# Patient Record
Sex: FEMALE | Race: WHITE | NOT HISPANIC OR LATINO | Employment: UNEMPLOYED | URBAN - NONMETROPOLITAN AREA
[De-identification: names, ages, dates, MRNs, and addresses within clinical notes are randomized per-mention and may not be internally consistent; named-entity substitution may affect disease eponyms.]

---

## 2018-02-26 ENCOUNTER — OFFICE VISIT (OUTPATIENT)
Dept: PSYCHIATRY | Facility: CLINIC | Age: 28
End: 2018-02-26

## 2018-02-26 VITALS — HEIGHT: 65 IN | BODY MASS INDEX: 31.99 KG/M2 | WEIGHT: 192 LBS

## 2018-02-26 DIAGNOSIS — F39 MOOD DISORDER (HCC): Primary | ICD-10-CM

## 2018-02-26 DIAGNOSIS — F41.1 GENERALIZED ANXIETY DISORDER: ICD-10-CM

## 2018-02-26 DIAGNOSIS — F41.0 PANIC DISORDER: ICD-10-CM

## 2018-02-26 PROCEDURE — 90792 PSYCH DIAG EVAL W/MED SRVCS: CPT | Performed by: NURSE PRACTITIONER

## 2018-02-26 RX ORDER — BUPROPION HYDROCHLORIDE 300 MG/1
TABLET ORAL
COMMUNITY
Start: 2018-01-25 | End: 2018-09-27
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
## 2018-09-27 ENCOUNTER — OFFICE VISIT (OUTPATIENT)
Dept: PSYCHIATRY | Facility: CLINIC | Age: 28
End: 2018-09-27

## 2018-09-27 VITALS — BODY MASS INDEX: 28.89 KG/M2 | WEIGHT: 173.4 LBS | HEIGHT: 65 IN

## 2018-09-27 DIAGNOSIS — F31.81 BIPOLAR II DISORDER (HCC): Primary | ICD-10-CM

## 2018-09-27 DIAGNOSIS — F41.1 GENERALIZED ANXIETY DISORDER: ICD-10-CM

## 2018-09-27 DIAGNOSIS — F41.0 PANIC DISORDER: ICD-10-CM

## 2018-09-27 PROCEDURE — 99214 OFFICE O/P EST MOD 30 MIN: CPT | Performed by: NURSE PRACTITIONER

## 2018-09-27 RX ORDER — LAMOTRIGINE 25 MG/1
TABLET ORAL
Qty: 45 TABLET | Refills: 0 | Status: SHIPPED | OUTPATIENT
Start: 2018-09-27 | End: 2018-11-15 | Stop reason: SDUPTHER

## 2018-09-27 RX ORDER — PROPRANOLOL HYDROCHLORIDE 10 MG/1
TABLET ORAL
Qty: 60 TABLET | Refills: 1 | Status: SHIPPED | OUTPATIENT
Start: 2018-09-27 | End: 2018-11-15 | Stop reason: SDUPTHER

## 2018-09-27 RX ORDER — DULOXETIN HYDROCHLORIDE 30 MG/1
30 CAPSULE, DELAYED RELEASE ORAL DAILY
Qty: 30 CAPSULE | Refills: 1 | Status: SHIPPED | OUTPATIENT
Start: 2018-09-27 | End: 2018-11-15 | Stop reason: SDUPTHER
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
## 2018-11-15 ENCOUNTER — OFFICE VISIT (OUTPATIENT)
Dept: PSYCHIATRY | Facility: CLINIC | Age: 28
End: 2018-11-15

## 2018-11-15 VITALS
HEIGHT: 65 IN | WEIGHT: 176.8 LBS | SYSTOLIC BLOOD PRESSURE: 122 MMHG | BODY MASS INDEX: 29.46 KG/M2 | DIASTOLIC BLOOD PRESSURE: 80 MMHG

## 2018-11-15 DIAGNOSIS — F41.0 PANIC DISORDER: ICD-10-CM

## 2018-11-15 DIAGNOSIS — F41.1 GENERALIZED ANXIETY DISORDER: ICD-10-CM

## 2018-11-15 DIAGNOSIS — F31.81 BIPOLAR II DISORDER (HCC): Primary | ICD-10-CM

## 2018-11-15 PROCEDURE — 99213 OFFICE O/P EST LOW 20 MIN: CPT | Performed by: NURSE PRACTITIONER

## 2018-11-15 RX ORDER — PROPRANOLOL HYDROCHLORIDE 10 MG/1
TABLET ORAL
Qty: 60 TABLET | Refills: 5 | Status: SHIPPED | OUTPATIENT
Start: 2018-11-15 | End: 2019-03-14 | Stop reason: SDUPTHER

## 2018-11-15 RX ORDER — LAMOTRIGINE 100 MG/1
TABLET ORAL
Qty: 15 TABLET | Refills: 5 | Status: SHIPPED | OUTPATIENT
Start: 2018-11-15 | End: 2019-03-14 | Stop reason: SDUPTHER

## 2018-11-15 RX ORDER — DULOXETIN HYDROCHLORIDE 30 MG/1
30 CAPSULE, DELAYED RELEASE ORAL DAILY
Qty: 30 CAPSULE | Refills: 5 | Status: SHIPPED | OUTPATIENT
Start: 2018-11-15 | End: 2019-03-14 | Stop reason: SDUPTHER

## 2018-11-15 NOTE — PROGRESS NOTES
"    Subjective   Tea Marie is a 28 y.o. female who is here today for medication management follow up.    Chief Complaint:     Bipolar II disorder (CMS/HCC)    Generalized anxiety disorder    Panic disorder        History of Present Illness Patient presents by herself reporting doing really well on current medications, denies depression, is motivated, interested in activities, moved to new apartment and loves it. Celebrating her son's 9th birthday, denies hypomania, denies sleep disturbance. Is working full time and feels \"leveled out\". Denies panic, scores anxiety 3 on most days or lower. Reports compliance with medications .  Denies adverse effects from medications.   (Scales based on 0 - 10 with 10 being the worst)        The following portions of the patient's history were reviewed and updated as appropriate: allergies, current medications, past family history, past medical history, past social history, past surgical history and problem list.    Review of Systemsdenies fever, cough, s/s’s of infection, denies GI/ problems, denies new medical issues     Objective   Physical Exam  Blood pressure 122/80, height 165.1 cm (65\"), weight 80.2 kg (176 lb 12.8 oz).    Allergies   Allergen Reactions   • Ceclor [Cefaclor] Rash       Current Medications:   Current Outpatient Medications   Medication Sig Dispense Refill   • DULoxetine (CYMBALTA) 30 MG capsule Take 1 capsule by mouth Daily. 30 capsule 5   • lamoTRIgine (LaMICtal) 100 MG tablet Take 1/2 tablet daily 15 tablet 5   • propranolol (INDERAL) 10 MG tablet Take twice a day as needed for anxiety 60 tablet 5     No current facility-administered medications for this visit.             Appearance: appropriate  Hygiene:   good  Cooperation:  Cooperative  Eye Contact:  Good  Psychomotor Behavior:  No psychomotor agitation/retardation, No EPS, No motor tics  Mood:  within normal limits  Affect:  Appropriate  Hopelessness: Denies  Speech:  Normal  Thought " Process:  Linear  Thought Content:  Normal  Concentration: Normal   Suicidal:  None  Homicidal:  None  Hallucinations:  None  Delusion:  None  Memory:  Intact  Orientation:  Person, Place, Time and Situation  Reliability:  fair  Insight:  Fair  Judgement:  Fair  Impulse Control:  Fair  Estimated Intelligence: average range      Assessment/Plan   Diagnoses and all orders for this visit:    Bipolar II disorder (CMS/HCC)    Generalized anxiety disorder    Panic disorder    Other orders  -     DULoxetine (CYMBALTA) 30 MG capsule; Take 1 capsule by mouth Daily.  -     lamoTRIgine (LaMICtal) 100 MG tablet; Take 1/2 tablet daily  -     propranolol (INDERAL) 10 MG tablet; Take twice a day as needed for anxiety        IMPRESSION: stable   PLAN:   Refill medications as above         We discussed risks, benefits, and side effects of the above medications and the patient was agreeable with the plan. Patient was educated on the importance of compliance with treatment and follow-up appointments.     Sleep hygiene reviewed, encouraged more whole foods, less processed foods, fruits vegetables , more activity   Coping skills reviewed and encouraged positive framing of thoughts    Assisted patient in processing above session content; acknowledged and normalized patient’s thoughts, feelings, and concerns.  Applied  positive coping skills and behavior management in session.  Allowed patient to freely discuss issues without interruption or judgment. Provided safe, confidential environment to facilitate the development of positive therapeutic relationship and encourage open, honest communication. Assisted patient in identifying risk factors which would indicate the need for higher level of care including thoughts to harm self or others and/or self-harming behavior and encouraged patient to contact this office, call 911, or present to the nearest emergency room should any of these events occur. Discussed crisis intervention services and  means to access.  Patient adamantly and convincingly denies current suicidal or homicidal ideation or perceptual disturbance.    Treatment Plan: stabilize mood, patient will stay out of the hospital and be at optimal level of functioning, take all medication as prescribed. Patient verbalized  understanding and agreement to plan.    Instructed to call for questions or concerns and return early if necessary. Crisis plan reviewed including going to the Emergency department.    Return in about 4 weeks (around 12/13/2018).

## 2019-03-14 ENCOUNTER — OFFICE VISIT (OUTPATIENT)
Dept: PSYCHIATRY | Facility: CLINIC | Age: 29
End: 2019-03-14

## 2019-03-14 VITALS
DIASTOLIC BLOOD PRESSURE: 76 MMHG | WEIGHT: 170 LBS | SYSTOLIC BLOOD PRESSURE: 116 MMHG | BODY MASS INDEX: 28.32 KG/M2 | HEIGHT: 65 IN

## 2019-03-14 DIAGNOSIS — F31.81 BIPOLAR II DISORDER (HCC): Primary | ICD-10-CM

## 2019-03-14 DIAGNOSIS — F41.1 GENERALIZED ANXIETY DISORDER: ICD-10-CM

## 2019-03-14 DIAGNOSIS — F41.0 PANIC DISORDER: ICD-10-CM

## 2019-03-14 PROCEDURE — 99213 OFFICE O/P EST LOW 20 MIN: CPT | Performed by: NURSE PRACTITIONER

## 2019-03-14 RX ORDER — TRAZODONE HYDROCHLORIDE 150 MG/1
150 TABLET ORAL NIGHTLY
Qty: 30 TABLET | Refills: 2 | Status: SHIPPED | OUTPATIENT
Start: 2019-03-14 | End: 2019-04-23 | Stop reason: SDUPTHER

## 2019-03-14 RX ORDER — PROPRANOLOL HYDROCHLORIDE 20 MG/1
TABLET ORAL
Qty: 60 TABLET | Refills: 2 | Status: SHIPPED | OUTPATIENT
Start: 2019-03-14 | End: 2019-04-23 | Stop reason: SDUPTHER

## 2019-03-14 RX ORDER — DULOXETIN HYDROCHLORIDE 30 MG/1
30 CAPSULE, DELAYED RELEASE ORAL DAILY
Qty: 30 CAPSULE | Refills: 2 | Status: SHIPPED | OUTPATIENT
Start: 2019-03-14 | End: 2019-04-23 | Stop reason: SDUPTHER

## 2019-03-14 RX ORDER — LAMOTRIGINE 100 MG/1
TABLET ORAL
Qty: 15 TABLET | Refills: 2 | Status: SHIPPED | OUTPATIENT
Start: 2019-03-14 | End: 2019-04-23 | Stop reason: SDUPTHER

## 2019-03-14 NOTE — PROGRESS NOTES
"    Subjective   Tea Marie is a 28 y.o. female who is here today for medication management follow up.    Chief Complaint:     Bipolar II disorder     Generalized anxiety disorder    Panic disorder        History of Present Illness Patient presents she got full time at work and has good benefits. She is raising 11yo and 10yo boys. Still really likes her apartment. Sleeping. Anxiety 6 most days, down from 9 and panic attacks but still anxious, really notices it if forgot to take the propranolol. Denies depression. Denies hypomania, denies anger .  Denies adverse effects from medications.   (Scales based on 0 - 10 with 10 being the worst)        The following portions of the patient's history were reviewed and updated as appropriate: allergies, current medications, past family history, past medical history, past social history, past surgical history and problem list.    Review of Systemsdenies fever, cough, s/s’s of infection, denies GI/ problems, denies new medical issues     Objective   Physical Exam  Blood pressure 116/76, height 165.1 cm (65\"), weight 77.1 kg (170 lb).    Allergies   Allergen Reactions   • Ceclor [Cefaclor] Rash       Current Medications:   Current Outpatient Medications   Medication Sig Dispense Refill   • DULoxetine (CYMBALTA) 30 MG capsule Take 1 capsule by mouth Daily. 30 capsule 2   • lamoTRIgine (LaMICtal) 100 MG tablet Take 1/2 tablet daily 15 tablet 2   • propranolol (INDERAL) 20 MG tablet Take twice a day as needed for anxiety 60 tablet 2   • traZODone (DESYREL) 150 MG tablet Take 1 tablet by mouth Every Night. 30 tablet 2     No current facility-administered medications for this visit.        Appearance: appropriate  Hygiene:   good  Cooperation:  Cooperative  Eye Contact:  Good  Psychomotor Behavior:  No psychomotor agitation/retardation, No EPS, No motor tics  Mood:  within normal limits  Affect:  Appropriate  Hopelessness: Denies  Speech:  Normal  Thought Process:  " Linear  Thought Content:  Normal  Concentration: Normal   Suicidal:  None  Homicidal:  None  Hallucinations:  None  Delusion:  None  Memory:  Intact  Orientation:  Person, Place, Time and Situation  Reliability:  fair  Insight:  Fair  Judgement:  Fair  Impulse Control:  Fair  Estimated Intelligence: average range        Assessment/Plan   Diagnoses and all orders for this visit:    Bipolar II disorder (CMS/HCC)    Generalized anxiety disorder    Panic disorder    Other orders  -     propranolol (INDERAL) 20 MG tablet; Take twice a day as needed for anxiety  -     DULoxetine (CYMBALTA) 30 MG capsule; Take 1 capsule by mouth Daily.  -     lamoTRIgine (LaMICtal) 100 MG tablet; Take 1/2 tablet daily  -     traZODone (DESYREL) 150 MG tablet; Take 1 tablet by mouth Every Night.        IMPRESSION: Anxiety diminished but still runs around a 6 Will increase propranolol  PLAN:   Refill Cymbalta 30 mg 1 p.o. Daily  Refill Lamictal 100 mg half tab daily mood stabilizer  Refill trazodone 150 mg p.o. q. at bedtime for sleep  Increase propranolol from 10 mg to 20 mg 1 p.o. twice daily for anxiety      We discussed risks, benefits, and side effects of the above medications and the patient was agreeable with the plan. Patient was educated on the importance of compliance with treatment and follow-up appointments.     Counseled patient regarding multimodal approach with healthy nutrition, healthy sleep, regular physical activity, social activities, counseling, and medications.       Coping skills reviewed and encouraged positive framing of thoughts    Assisted patient in processing above session content; acknowledged and normalized patient’s thoughts, feelings, and concerns.  Applied  positive coping skills and behavior management in session.  Allowed patient to freely discuss issues without interruption or judgment. Provided safe, confidential environment to facilitate the development of positive therapeutic relationship and encourage  open, honest communication. Assisted patient in identifying risk factors which would indicate the need for higher level of care including thoughts to harm self or others and/or self-harming behavior and encouraged patient to contact this office, call 911, or present to the nearest emergency room should any of these events occur. Discussed crisis intervention services and means to access.  Patient adamantly and convincingly denies current suicidal or homicidal ideation or perceptual disturbance.    Treatment Plan: stabilize mood, patient will stay out of the hospital and be at optimal level of functioning, take all medication as prescribed. Patient verbalized  understanding and agreement to plan.    Instructed to call for questions or concerns and return early if necessary. Crisis plan reviewed including going to the Emergency department.    Return in about 4 months (around 7/14/2019).  She will call if propranolol is not effective to help with decreasing anxiety or has any questions or concerns.

## 2019-04-23 ENCOUNTER — OFFICE VISIT (OUTPATIENT)
Dept: PSYCHIATRY | Facility: CLINIC | Age: 29
End: 2019-04-23

## 2019-04-23 VITALS — HEIGHT: 65 IN | WEIGHT: 169 LBS | BODY MASS INDEX: 28.16 KG/M2

## 2019-04-23 DIAGNOSIS — F51.05 INSOMNIA DUE TO MENTAL CONDITION: ICD-10-CM

## 2019-04-23 DIAGNOSIS — F41.0 PANIC DISORDER: ICD-10-CM

## 2019-04-23 DIAGNOSIS — F31.81 BIPOLAR II DISORDER (HCC): Primary | ICD-10-CM

## 2019-04-23 DIAGNOSIS — F41.1 GENERALIZED ANXIETY DISORDER: ICD-10-CM

## 2019-04-23 PROCEDURE — 99213 OFFICE O/P EST LOW 20 MIN: CPT | Performed by: NURSE PRACTITIONER

## 2019-04-23 RX ORDER — QUETIAPINE FUMARATE 25 MG/1
TABLET, FILM COATED ORAL
Qty: 30 TABLET | Refills: 1 | Status: SHIPPED | OUTPATIENT
Start: 2019-04-23 | End: 2020-06-26

## 2019-04-23 RX ORDER — LAMOTRIGINE 100 MG/1
TABLET ORAL
Qty: 15 TABLET | Refills: 2 | Status: SHIPPED | OUTPATIENT
Start: 2019-04-23 | End: 2020-06-26

## 2019-04-23 RX ORDER — DULOXETIN HYDROCHLORIDE 60 MG/1
60 CAPSULE, DELAYED RELEASE ORAL DAILY
Qty: 30 CAPSULE | Refills: 2 | Status: SHIPPED | OUTPATIENT
Start: 2019-04-23 | End: 2020-06-26

## 2019-04-23 RX ORDER — TRAZODONE HYDROCHLORIDE 150 MG/1
150 TABLET ORAL NIGHTLY
Qty: 30 TABLET | Refills: 2 | Status: SHIPPED | OUTPATIENT
Start: 2019-04-23 | End: 2020-06-26

## 2019-04-23 RX ORDER — PROPRANOLOL HYDROCHLORIDE 20 MG/1
TABLET ORAL
Qty: 60 TABLET | Refills: 2 | Status: SHIPPED | OUTPATIENT
Start: 2019-04-23 | End: 2019-04-23

## 2019-04-23 NOTE — PROGRESS NOTES
"    Subjective   Tea Marie is a 28 y.o. female who is here today for medication management follow up.    Chief Complaint    Bipolar II disorder (CMS/HCC)    Generalized anxiety disorder    Panic disorder    Insomnia due to mental condition    History of Present Illness Patient presents by herself. She is working full time at [employer] on third shift and her ex  watches them. She reports anxiety cont to be high even with increasing propranolol to 20mg bid. She states she is on verge of panic attacks often, she has more anxiety at work \"but it isn't hard\". She is sleeping when she takes the Trazodone 150mg nightly. She reports anxiety a 8 and denies depression. She has financial stressors \"bills are more than I make\" she doesn't get child support , they split caring for kids. She doesn't like going out , doesn't have friends, \"that's part of my problem hard time being around others\".  Denies adverse effects from medications.   (Scales based on 0 - 10 with 10 being the worst)        The following portions of the patient's history were reviewed and updated as appropriate: allergies, current medications, past family history, past medical history, past social history, past surgical history and problem list.    Review of Systemsdenies fever, cough, s/s’s of infection, denies GI/ problems, denies new medical issues     Objective   Physical Exam  Height 165.1 cm (65\"), weight 76.7 kg (169 lb).    Allergies   Allergen Reactions   • Ceclor [Cefaclor] Rash       Current Medications:   Current Outpatient Medications   Medication Sig Dispense Refill   • DULoxetine (CYMBALTA) 60 MG capsule Take 1 capsule by mouth Daily. 30 capsule 2   • lamoTRIgine (LaMICtal) 100 MG tablet Take 1/2 tablet daily 15 tablet 2   • QUEtiapine (SEROquel) 25 MG tablet Take 1/2 tablet up to twice a day as needed for anxiety 30 tablet 1   • traZODone (DESYREL) 150 MG tablet Take 1 tablet by mouth Every Night. 30 tablet 2     No current " facility-administered medications for this visit.      Appearance: appropriate  Hygiene:   good  Cooperation:  Cooperative  Eye Contact:  Good  Psychomotor Behavior:  No psychomotor agitation/retardation, No EPS, No motor tics  Mood:  anxious  Affect:  Appropriate  Hopelessness: Denies  Speech:  Normal  Thought Process:  Linear  Thought Content:  Normal  Concentration: Normal   Suicidal:  None  Homicidal:  None  Hallucinations:  None  Delusion:  None  Memory:  Intact  Orientation:  Person, Place, Time and Situation  Reliability:  fair  Insight:  Fair  Judgement:  Fair  Impulse Control:  Fair  Estimated Intelligence: average range    Assessment/Plan   Diagnoses and all orders for this visit:    Bipolar II disorder (CMS/HCC)    Generalized anxiety disorder    Panic disorder    Insomnia due to mental condition    Other orders  -     traZODone (DESYREL) 150 MG tablet; Take 1 tablet by mouth Every Night.  -     Discontinue: propranolol (INDERAL) 20 MG tablet; Take twice a day as needed for anxiety  -     lamoTRIgine (LaMICtal) 100 MG tablet; Take 1/2 tablet daily  -     DULoxetine (CYMBALTA) 60 MG capsule; Take 1 capsule by mouth Daily.  -     QUEtiapine (SEROquel) 25 MG tablet; Take 1/2 tablet up to twice a day as needed for anxiety    IN at 1:35  Out at 2p   25 minutes face to face     IMPRESSION: increasing anxiety  PLAN:   Encouraged therapy for coping skills , self regulation of mood , insight, rationale given to encouraged follow through  Increase Cymbalta to 60mg daily  Cont trazodone 150mg one at hs for sleep  Lamotrigine 50mg daily  Pt not wanting to take propranolol stating it hasn't done a thing for anxiety, she has been on buspar and hydroxyzine she reports without having efficacy  She is asking for something on as needed basis for anxiety  Begin quetiapine 12.5mg as needed up to twice a day for anxiety     We discussed risks, benefits, and side effects of the above medications and the patient was agreeable  with the plan. Patient was educated on the importance of compliance with treatment and follow-up appointments.     Counseled patient regarding multimodal approach with healthy nutrition, healthy sleep, regular physical activity, social activities, counseling, and medications.     Treatment Plan: stabilize mood, patient will stay out of the hospital and be at optimal level of functioning, take all medication as prescribed. Patient verbalized  understanding and agreement to plan.    Instructed to call for questions or concerns and return early if necessary. Crisis plan reviewed including going to the Emergency department.    Return in about 4 weeks (around 5/21/2019).

## 2020-06-16 ENCOUNTER — HOSPITAL ENCOUNTER (EMERGENCY)
Facility: HOSPITAL | Age: 30
Discharge: HOME | End: 2020-06-16
Attending: EMERGENCY MEDICINE | Admitting: EMERGENCY MEDICINE

## 2020-06-16 VITALS
BODY MASS INDEX: 28.77 KG/M2 | SYSTOLIC BLOOD PRESSURE: 124 MMHG | RESPIRATION RATE: 16 BRPM | WEIGHT: 162.4 LBS | DIASTOLIC BLOOD PRESSURE: 60 MMHG | HEART RATE: 93 BPM | HEIGHT: 63 IN | OXYGEN SATURATION: 100 % | TEMPERATURE: 99.2 F

## 2020-06-16 DIAGNOSIS — R20.2 PARESTHESIA: ICD-10-CM

## 2020-06-16 DIAGNOSIS — M54.10 RADICULOPATHY, UNSPECIFIED SPINAL REGION: Primary | ICD-10-CM

## 2020-06-16 PROCEDURE — 99282 EMERGENCY DEPT VISIT SF MDM: CPT

## 2020-06-26 ENCOUNTER — INITIAL PRENATAL (OUTPATIENT)
Dept: OBSTETRICS AND GYNECOLOGY | Facility: CLINIC | Age: 30
End: 2020-06-26

## 2020-06-26 VITALS — SYSTOLIC BLOOD PRESSURE: 118 MMHG | WEIGHT: 160 LBS | BODY MASS INDEX: 28.34 KG/M2 | DIASTOLIC BLOOD PRESSURE: 70 MMHG

## 2020-06-26 DIAGNOSIS — O36.80X0 ENCOUNTER TO DETERMINE FETAL VIABILITY OF PREGNANCY, SINGLE OR UNSPECIFIED FETUS: ICD-10-CM

## 2020-06-26 DIAGNOSIS — O36.80X0 ENCOUNTER TO DETERMINE FETAL VIABILITY OF PREGNANCY, SINGLE OR UNSPECIFIED FETUS: Primary | ICD-10-CM

## 2020-06-26 LAB
C TRACH RRNA SPEC DONR QL NAA+PROBE: NEGATIVE
N GONORRHOEA DNA SPEC QL NAA+PROBE: NEGATIVE

## 2020-06-26 PROCEDURE — 99203 OFFICE O/P NEW LOW 30 MIN: CPT | Performed by: OBSTETRICS & GYNECOLOGY

## 2020-06-26 NOTE — PROGRESS NOTES
Subjective   Chief Complaint   Patient presents with   • Initial Prenatal Visit     New OB, LMP 2020, Pt had pap 2019 @  office, No Complaints/concerns      Tea Carpenter is a 30 y.o. year old .  Patient's last menstrual period was 2020.  She presents to be seen because of early IUP.  Plan pregnancy.  Good health.  No significant surgical history.  Taking a vitamin.  Pap normal last year Dr. Jarrell's office.   G1: 8 pounds 2 ounces 40 weeks.  G2: 38 weeks 8 pounds 7 ounces.  Benign (  OTHER COMPLAINTS:  Nothing else    The following portions of the patient's history were reviewed and updated as appropriate:  She  has no past medical history on file.  She does not have a problem list on file.  She  has a past surgical history that includes Tonsillectomy and adenoidectomy and No past surgeries.  Her family history is not on file.  She  reports that she has never smoked. She has never used smokeless tobacco. She reports that she does not drink alcohol or use drugs.  Current Outpatient Medications   Medication Sig Dispense Refill   • PRENATAL GUMMY VITAMIN Chew 1 each Daily.       No current facility-administered medications for this visit.      Current Outpatient Medications on File Prior to Visit   Medication Sig   • PRENATAL GUMMY VITAMIN Chew 1 each Daily.   • [DISCONTINUED] DULoxetine (CYMBALTA) 60 MG capsule Take 1 capsule by mouth Daily.   • [DISCONTINUED] lamoTRIgine (LaMICtal) 100 MG tablet Take 1/2 tablet daily   • [DISCONTINUED] QUEtiapine (SEROquel) 25 MG tablet Take 1/2 tablet up to twice a day as needed for anxiety   • [DISCONTINUED] traZODone (DESYREL) 150 MG tablet Take 1 tablet by mouth Every Night.     No current facility-administered medications on file prior to visit.      She is allergic to ceclor [cefaclor].    Social History    Tobacco Use      Smoking status: Never Smoker      Smokeless tobacco: Never Used    Review of Systems  Consitutional POS: nothing reported     NEG: anorexia or night sweats   Gastointestinal POS: nothing reported    NEG: bloating, change in bowel habits, melena or reflux symptoms   Genitourinary POS: nothing reported    NEG: dysuria or hematuria   Integument POS: nothing reported    NEG: moles that are changing in size, shape, color or rashes   Breast POS: nothing reported    NEG: persistent breast lump, skin dimpling or nipple discharge         Respiratory: negative  Cardiovascular: negative          Objective   /70   Wt 72.6 kg (160 lb)   LMP 05/08/2020   BMI 28.34 kg/m²     General:  well developed; well nourished  no acute distress   Skin:  No suspicious lesions seen   Thyroid: normal to inspection and palpation   Lungs:  breathing is unlabored  clear to auscultation bilaterally   Heart:  regular rate and rhythm, S1, S2 normal, no murmur, click, rub or gallop   Breasts:  Not performed.   Abdomen: soft, non-tender; no masses  no umbilical or inguinal hernias are present  no hepato-splenomegaly   Pelvis: Clinical staff was present for exam  External genitalia:  normal appearance of the external genitalia including Bartholin's and Dysart's glands.  :  urethral meatus normal;  Vaginal:  normal pink mucosa without prolapse or lesions.  Cervix:  normal appearance.  Uterus:  normal size, shape and consistency.  Adnexa:  normal bimanual exam of the adnexa.  Rectal:  digital rectal exam not performed; anus visually normal appearing.     Psychiatric: Alert and oriented ×3, mood and affect appropriate  HEENT: Atraumatic, normocephalic, normal scleral icterus  Extremities: 2+ pulses bilaterally, no edema      Lab Review   No data reviewed    Imaging   Pelvic ultrasound images independantly reviewed - Intrauterine pregnancy at 6 weeks and 2 days.  We will keep LIBAN February 12, 2021.  2 cm corpus luteal on the right.  Normal left.  No masses.  No free fluid.  Positive cardiac activity 129 bpm        Assessment   1. IUP @ 6-7 weeks  2. Screening for  cervical cancer     Plan   1. PAP done with cultures  2. LAbs today    No orders of the defined types were placed in this encounter.         This note was electronically signed.      June 26, 2020

## 2020-06-27 LAB
ABO GROUP BLD: ABNORMAL
BASOPHILS # BLD AUTO: 0 X10E3/UL (ref 0–0.2)
BASOPHILS NFR BLD AUTO: 1 %
BLD GP AB SCN SERPL QL: NEGATIVE
EOSINOPHIL # BLD AUTO: 0.7 X10E3/UL (ref 0–0.4)
EOSINOPHIL NFR BLD AUTO: 10 %
ERYTHROCYTE [DISTWIDTH] IN BLOOD BY AUTOMATED COUNT: 13.8 % (ref 11.7–15.4)
HBV SURFACE AG SERPL QL IA: NEGATIVE
HCT VFR BLD AUTO: 33.3 % (ref 34–46.6)
HCV AB S/CO SERPL IA: <0.1 S/CO RATIO (ref 0–0.9)
HGB BLD-MCNC: 11.1 G/DL (ref 11.1–15.9)
HIV 1+2 AB+HIV1 P24 AG SERPL QL IA: NON REACTIVE
IMM GRANULOCYTES # BLD AUTO: 0 X10E3/UL (ref 0–0.1)
IMM GRANULOCYTES NFR BLD AUTO: 0 %
LYMPHOCYTES # BLD AUTO: 1.7 X10E3/UL (ref 0.7–3.1)
LYMPHOCYTES NFR BLD AUTO: 26 %
MCH RBC QN AUTO: 29.1 PG (ref 26.6–33)
MCHC RBC AUTO-ENTMCNC: 33.3 G/DL (ref 31.5–35.7)
MCV RBC AUTO: 87 FL (ref 79–97)
MONOCYTES # BLD AUTO: 0.5 X10E3/UL (ref 0.1–0.9)
MONOCYTES NFR BLD AUTO: 8 %
NEUTROPHILS # BLD AUTO: 3.7 X10E3/UL (ref 1.4–7)
NEUTROPHILS NFR BLD AUTO: 55 %
PLATELET # BLD AUTO: 305 X10E3/UL (ref 150–450)
RBC # BLD AUTO: 3.82 X10E6/UL (ref 3.77–5.28)
RH BLD: POSITIVE
RPR SER QL: NON REACTIVE
RUBV IGG SERPL IA-ACNC: 2.14 INDEX
WBC # BLD AUTO: 6.6 X10E3/UL (ref 3.4–10.8)

## 2020-07-06 LAB
CFTR MUT ANL BLD/T: NORMAL
LABORATORY COMMENT REPORT: NORMAL

## 2020-07-08 DIAGNOSIS — O36.80X0 ENCOUNTER TO DETERMINE FETAL VIABILITY OF PREGNANCY, SINGLE OR UNSPECIFIED FETUS: ICD-10-CM

## 2020-07-27 ENCOUNTER — ROUTINE PRENATAL (OUTPATIENT)
Dept: OBSTETRICS AND GYNECOLOGY | Facility: CLINIC | Age: 30
End: 2020-07-27

## 2020-07-27 VITALS — DIASTOLIC BLOOD PRESSURE: 68 MMHG | BODY MASS INDEX: 28.52 KG/M2 | WEIGHT: 161 LBS | SYSTOLIC BLOOD PRESSURE: 122 MMHG

## 2020-07-27 DIAGNOSIS — Z34.91 NORMAL PREGNANCY, FIRST TRIMESTER: Primary | ICD-10-CM

## 2020-07-27 PROCEDURE — 99213 OFFICE O/P EST LOW 20 MIN: CPT | Performed by: NURSE PRACTITIONER

## 2020-07-27 NOTE — PATIENT INSTRUCTIONS
First Trimester of Pregnancy    The first trimester of pregnancy is from week 1 until the end of week 12 (months 1 through 3). During this time, your baby will begin to develop inside you. At 6-8 weeks, the eyes and face are formed, and the heartbeat can be seen on ultrasound. At the end of 12 weeks, all the baby's organs are formed. Prenatal care is all the medical care you receive before the birth of your baby. Make sure you get good prenatal care and follow all of your doctor's instructions.  HOME CARE   Medicines  · Take medicine only as told by your doctor. Some medicines are safe and some are not during pregnancy.  · Take your prenatal vitamins as told by your doctor.  · Take medicine that helps you poop (stool softener) as needed if your doctor says it is okay.  Diet  · Eat regular, healthy meals.  · Your doctor will tell you the amount of weight gain that is right for you.  · Avoid raw meat and uncooked cheese.  · If you feel sick to your stomach (nauseous) or throw up (vomit):  ¨ Eat 4 or 5 small meals a day instead of 3 large meals.  ¨ Try eating a few soda crackers.  ¨ Drink liquids between meals instead of during meals.  · If you have a hard time pooping (constipation):  ¨ Eat high-fiber foods like fresh vegetables, fruit, and whole grains.  ¨ Drink enough fluids to keep your pee (urine) clear or pale yellow.  Activity and Exercise  · Exercise only as told by your doctor. Stop exercising if you have cramps or pain in your lower belly (abdomen) or low back.  · Try to avoid standing for long periods of time. Move your legs often if you must  one place for a long time.  · Avoid heavy lifting.  · Wear low-heeled shoes. Sit and stand up straight.  · You can have sex unless your doctor tells you not to.  Relief of Pain or Discomfort  · Wear a good support bra if your breasts are sore.  · Take warm water baths (sitz baths) to soothe pain or discomfort caused by hemorrhoids. Use hemorrhoid cream if your  doctor says it is okay.  · Rest with your legs raised if you have leg cramps or low back pain.  · Wear support hose if you have puffy, bulging veins (varicose veins) in your legs. Raise (elevate) your feet for 15 minutes, 3-4 times a day. Limit salt in your diet.  Prenatal Care  · Schedule your prenatal visits by the twelfth week of pregnancy.  · Write down your questions. Take them to your prenatal visits.  · Keep all your prenatal visits as told by your doctor.  Safety  · Wear your seat belt at all times when driving.  · Make a list of emergency phone numbers. The list should include numbers for family, friends, the hospital, and police and fire departments.  General Tips  · Ask your doctor for a referral to a local prenatal class. Begin classes no later than at the start of month 6 of your pregnancy.  · Ask for help if you need counseling or help with nutrition. Your doctor can give you advice or tell you where to go for help.  · Do not use hot tubs, steam rooms, or saunas.  · Do not douche or use tampons or scented sanitary pads.  · Do not cross your legs for long periods of time.  · Avoid litter boxes and soil used by cats.  · Avoid all smoking, herbs, and alcohol. Avoid drugs not approved by your doctor.  · Do not use any tobacco products, including cigarettes, chewing tobacco, and electronic cigarettes. If you need help quitting, ask your doctor. You may get counseling or other support to help you quit.  · Visit your dentist. At home, brush your teeth with a soft toothbrush. Be gentle when you floss.  GET HELP IF:  · You are dizzy.  · You have mild cramps or pressure in your lower belly.  · You have a nagging pain in your belly area.  · You continue to feel sick to your stomach, throw up, or have watery poop (diarrhea).  · You have a bad smelling fluid coming from your vagina.  · You have pain with peeing (urination).  · You have increased puffiness (swelling) in your face, hands, legs, or ankles.  GET HELP  RIGHT AWAY IF:   · You have a fever.  · You are leaking fluid from your vagina.  · You have spotting or bleeding from your vagina.  · You have very bad belly cramping or pain.  · You gain or lose weight rapidly.  · You throw up blood. It may look like coffee grounds.  · You are around people who have Vincentian measles, fifth disease, or chickenpox.  · You have a very bad headache.  · You have shortness of breath.  · You have any kind of trauma, such as from a fall or a car accident.     This information is not intended to replace advice given to you by your health care provider. Make sure you discuss any questions you have with your health care provider.

## 2020-07-27 NOTE — PROGRESS NOTES
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    She is concerned if others may have been exposed       Contractions - No;   Leaking - No;   Vaginal bleeding -  No;   Swelling of extremities - No.       ROS:        GI: Nausea - only twice   Constipation - No;   Diarrhea - No    Neuro: Headache - No;   Visual change - No    Pertinent items are noted in HPI, all other systems reviewed and negative     Social History     Socioeconomic History   • Marital status:      Spouse name: Not on file   • Number of children: Not on file   • Years of education: Not on file   • Highest education level: Not on file   Tobacco Use   • Smoking status: Never Smoker   • Smokeless tobacco: Never Used   Substance and Sexual Activity   • Alcohol use: No   • Drug use: No   • Sexual activity: Yes     Partners: Male     Birth control/protection: None          EXAM  /68   Wt 73 kg (161 lb)   LMP 2020   BMI 28.52 kg/m²  -See Prenatal Assessment  General Appearance:  Pleasant  Lungs: Breathing unlabored  Abdomen:  See flow sheet for Fundal ht, FM, FHT's  LE: Neg edema  V/E: Not performed     Lab Results   Component Value Date    ABO O 2020    RH Positive 2020    ABSCRN Negative 2020       MDM  Impression: Supervision of normal pregnancy   Fatigue  Co-worker with + covid 19   Tests done today: NIPS - discussed & optional   Topics discussed: adequate rest and fluids  Written info on 1st trimester of pregnancy  s/s to report     Off work x 2 wks    Note for future work - limit to 40 hrs / wk  encouraged questions - call prn    Tests next visit: none         Current  Outpatient Medications:   •  PRENATAL GUMMY VITAMIN, Chew 1 each Daily., Disp: , Rfl:     Past Surgical History:   Procedure Laterality Date   • NO PAST SURGERIES     • TONSILLECTOMY AND ADENOIDECTOMY         OB History        3    Para   2    Term   2            AB        Living   2       SAB        TAB        Ectopic        Molar        Multiple        Live Births   2                History reviewed. No pertinent past medical history.    History reviewed. No pertinent family history.

## 2020-08-11 ENCOUNTER — ROUTINE PRENATAL (OUTPATIENT)
Dept: OBSTETRICS AND GYNECOLOGY | Facility: CLINIC | Age: 30
End: 2020-08-11

## 2020-08-11 VITALS — WEIGHT: 164 LBS | SYSTOLIC BLOOD PRESSURE: 126 MMHG | BODY MASS INDEX: 29.05 KG/M2 | DIASTOLIC BLOOD PRESSURE: 72 MMHG

## 2020-08-11 DIAGNOSIS — Z3A.12 12 WEEKS GESTATION OF PREGNANCY: Primary | ICD-10-CM

## 2020-08-11 PROCEDURE — 99213 OFFICE O/P EST LOW 20 MIN: CPT | Performed by: OBSTETRICS & GYNECOLOGY

## 2020-08-11 NOTE — PROGRESS NOTES
Chief Complaint   Patient presents with   • Routine Prenatal Visit     Spotting after interourse , pt request [facility]        HPI:   , 12w6d gestation reports brownsih blood after intercourse- 2 days ago.     ROS:  See Prenatal Episode/Flowsheet  /72   Wt 74.4 kg (164 lb)   LMP 2020   BMI 29.05 kg/m²      EXAM:  EXTREMITIES:  No swelling-See Prenatal Episode/Flowsheet    ABDOMEN:  FHTs/Movement noted-See Prenatal Episode/Flowsheet    URINE GLUCOSE/PROTEIN:  See Prenatal Episode/Flowsheet    PELVIC EXAM:  See Prenatal Episode/Flowsheet  CV:  Lungs:  GYN:    MDM:    Lab Results   Component Value Date    HGB 11.1 2020    RUBELLAABIGG 2.14 2020    HEPBSAG Negative 2020    ABO O 2020    RH Positive 2020    ABSCRN Negative 2020    [id number] Non Reactive 2020    HEPCVIRUSABY <0.1 2020       U/S:    1. IUP 12w6d  2. Routine care   3. Post coital bleeding after intercourse: Brownish-- looks like old blood. No active bleeding on exam-- PRx 4 weeks  4. Desires genetic testing.

## 2020-08-16 LAB
CFDNA.FET/CFDNA.TOTAL SFR FETUS: NORMAL %
CITATION REF LAB TEST: NORMAL
FET 13+18+21+X+Y ANEUP PLAS.CFDNA: NEGATIVE
FET CHR 21 TS PLAS.CFDNA QL: NEGATIVE
FET MS X RISK WBC.DNA+CFDNA QL: NOT DETECTED
FET SEX PLAS.CFDNA DOSAGE CFDNA: NORMAL
FET TS 13 RISK PLAS.CFDNA QL: NEGATIVE
FET TS 18 RISK WBC.DNA+CFDNA QL: NEGATIVE
FET X + Y ANEUP RISK PLAS.CFDNA SEQ-IMP: NOT DETECTED
GA EST FROM CONCEPTION DATE: NORMAL D
GESTATIONAL AGE > 9:: YES
LAB DIRECTOR NAME PROVIDER: NORMAL
LAB DIRECTOR NAME PROVIDER: NORMAL
LABORATORY COMMENT REPORT: NORMAL
LIMITATIONS OF THE TEST: NORMAL
NEGATIVE PREDICTIVE VALUE: NORMAL
NOTE: NORMAL
PERFORMANCE CHARACTERISTICS: NORMAL
POSITIVE PREDICTIVE VALUE: NORMAL
REF LAB TEST METHOD: NORMAL
TEST PERFORMANCE INFO SPEC: NORMAL

## 2020-08-24 ENCOUNTER — ROUTINE PRENATAL (OUTPATIENT)
Dept: OBSTETRICS AND GYNECOLOGY | Facility: CLINIC | Age: 30
End: 2020-08-24

## 2020-08-24 VITALS — SYSTOLIC BLOOD PRESSURE: 124 MMHG | BODY MASS INDEX: 29.23 KG/M2 | DIASTOLIC BLOOD PRESSURE: 68 MMHG | WEIGHT: 165 LBS

## 2020-08-24 DIAGNOSIS — Z34.82 ENCOUNTER FOR SUPERVISION OF OTHER NORMAL PREGNANCY, SECOND TRIMESTER: Primary | ICD-10-CM

## 2020-08-24 PROCEDURE — 99213 OFFICE O/P EST LOW 20 MIN: CPT | Performed by: OBSTETRICS & GYNECOLOGY

## 2020-08-24 NOTE — PROGRESS NOTES
Chief Complaint   Patient presents with   • Routine Prenatal Visit     No complaints.        HPI: Tea is a  currently at 14w5d who today reports the following:  Contractions - No; Leaking - No; Vaginal bleeding -  No; Heartburn - No.  The patient is without complaints.  She denies any fetal movement at present.  Patient denies any spotting or bleeding since her last visit.   Patient did have genetic screening last visit.  ROS:   GI:   Nausea - No; Constipation - No; Diarrhea - No.   Neuro:  Headache - No; Visual disturbances - No.    EXAM:   Vitals:  See prenatal flowsheet as noted and reviewed   General: Alert, cooperative, and does not appear in any distress   Lungs:  Respirations regular, even and unlabored   Abdomen:   See prenatal flowsheet as noted and reviewed     Uterus gravid, non-tender; no palpable masses     No guarding or rebound tenderness   Pelvic:  See prenatal flowsheet as noted and reviewed   Ext:  See prenatal flowsheet as noted and reviewed     Moves extremities well, no cyanosis and no redness   Skin:  No bleeding, bruising or rash and no lesions noted   Psych:  Normal mood and affect, oriented and thought content appropriate   Urine:  See prenatal flowsheet as noted and reviewed    Prenatal Labs  Lab Results   Component Value Date    HGB 11.1 2020    RUBELLAABIGG 2.14 2020    HEPBSAG Negative 2020    ABO O 2020    RH Positive 2020    ABSCRN Negative 2020    SCM1YJC4 Non Reactive 2020    HEPCVIRUSABY <0.1 2020     Routine Prenatal on 2020   Component Date Value   • Gestation 2020 [place]    • Fetal Fraction 2020 9%    • Gestational Age >9: 2020 Yes    • Result 2020 Negative    •  Comments 2020 Comment    • Approved By 2020 Comment    • TRISOMY 21 (DOWN SYNDROM* 2020 Negative    • TRISOMY 18 (GRAVES SYND* 2020 Negative    • TRISOMY 13 (PATAU SYNDRO* 2020 Negative     • FETAL SEX 08/11/2020 Comment    • MONOSOMY X (BALLARD SYNDR* 08/11/2020 Not Detected    • XYY (RIVERA SYNDROME) 08/11/2020 Not Detected    • XXY (KLINEFELTER SYNDROM* 08/11/2020 Not Detected    • XXX (TRIPLE X SYNDROME) 08/11/2020 Not Detected    • NEGATIVE PREDICTIVE VALUE 08/11/2020 Note    • POSITIVE PREDICTIVE VALUE 08/11/2020 N/A    • About The Test 08/11/2020 Comment    • Test Method 08/11/2020 Comment    • Performance 08/11/2020 Comment    • PERFORMANCE CHARACTERIST* 08/11/2020 Note    • Limitations of the Test 08/11/2020 Comment    • Note 08/11/2020 Comment    • References 08/11/2020 Comment        MDM:  Impression: Supervision of low risk pregnancy   Tests done today: none   Topics discussed: ab precautions  genetic screening - pt to consider msafp if desired as discussed   Tests next visit: U/S for anatomic screening     This note was electronically signed.  Deloris Leon M.D.

## 2020-09-21 ENCOUNTER — ROUTINE PRENATAL (OUTPATIENT)
Dept: OBSTETRICS AND GYNECOLOGY | Facility: CLINIC | Age: 30
End: 2020-09-21

## 2020-09-22 ENCOUNTER — TELEPHONE (OUTPATIENT)
Dept: OBSTETRICS AND GYNECOLOGY | Facility: CLINIC | Age: 30
End: 2020-09-22

## 2020-09-22 NOTE — TELEPHONE ENCOUNTER
----- Message from Thong Culver MD sent at 9/22/2020  7:09 AM EDT -----  Please call this patient to review her ultrasound report from yesterday.  Normal anatomy.  Let her know gender if she desires.  Her placenta is low-lying, roughly 0.5 cm from the cervix.  This will likely resolve as the uterus enlarges because she does not have previous C-sections.  Thanks

## 2020-10-19 ENCOUNTER — ROUTINE PRENATAL (OUTPATIENT)
Dept: OBSTETRICS AND GYNECOLOGY | Facility: CLINIC | Age: 30
End: 2020-10-19

## 2020-10-19 VITALS — DIASTOLIC BLOOD PRESSURE: 70 MMHG | SYSTOLIC BLOOD PRESSURE: 118 MMHG | BODY MASS INDEX: 31.53 KG/M2 | WEIGHT: 178 LBS

## 2020-10-19 DIAGNOSIS — Z34.82 ENCOUNTER FOR SUPERVISION OF OTHER NORMAL PREGNANCY IN SECOND TRIMESTER: Primary | ICD-10-CM

## 2020-10-19 PROCEDURE — 99212 OFFICE O/P EST SF 10 MIN: CPT | Performed by: MIDWIFE

## 2020-10-19 NOTE — PROGRESS NOTES
Chief Complaint   Patient presents with   • Routine Prenatal Visit     No complaints       HPI: Tea is a  currently at 22w5d who today reports the following:   Leaking - No; Heartburn - No. Fetal movement - YES.    ROS:   GI:   Nausea - No; Constipation - No;    Neuro:  Headache - No; Visual disturbances - No.    Social History    Tobacco Use      Smoking status: Never Smoker      Smokeless tobacco: Never Used      EXAM:   Vitals:  See prenatal flowsheet as noted and reviewed /70, Wt +13#   Abdomen:   See prenatal flowsheet as noted and reviewed, soft, nontender   Pelvic:  See prenatal flowsheet as noted and reviewed   Urine:  See prenatal flowsheet as noted and reviewed    Lab Results   Component Value Date    ABO O 2020    RH Positive 2020    ABSCRN Negative 2020       MDM:  Impression: Supervision of low risk pregnancy   Tests done today: none   Topics discussed: kick counts and fetal movement  healthy eating, weight gain   Tests next visit: GCT  HgB                RTO:                        4 weeks    This note was electronically signed.  Georgia Ortiz, YO  10/19/2020

## 2020-10-24 ENCOUNTER — RESULTS ENCOUNTER (OUTPATIENT)
Dept: OBSTETRICS AND GYNECOLOGY | Facility: CLINIC | Age: 30
End: 2020-10-24

## 2020-10-24 DIAGNOSIS — Z34.82 ENCOUNTER FOR SUPERVISION OF OTHER NORMAL PREGNANCY IN SECOND TRIMESTER: ICD-10-CM

## 2020-11-17 ENCOUNTER — ROUTINE PRENATAL (OUTPATIENT)
Dept: OBSTETRICS AND GYNECOLOGY | Facility: CLINIC | Age: 30
End: 2020-11-17

## 2020-11-17 VITALS — DIASTOLIC BLOOD PRESSURE: 64 MMHG | WEIGHT: 183 LBS | SYSTOLIC BLOOD PRESSURE: 118 MMHG | BODY MASS INDEX: 32.42 KG/M2

## 2020-11-17 DIAGNOSIS — Z3A.26 26 WEEKS GESTATION OF PREGNANCY: ICD-10-CM

## 2020-11-17 DIAGNOSIS — Z34.92 PRENATAL CARE IN SECOND TRIMESTER: Primary | ICD-10-CM

## 2020-11-17 LAB
BASOPHILS # BLD AUTO: 0.07 10*3/MM3 (ref 0–0.2)
BASOPHILS NFR BLD AUTO: 0.7 % (ref 0–1.5)
EOSINOPHIL # BLD AUTO: 0.59 10*3/MM3 (ref 0–0.4)
EOSINOPHIL NFR BLD AUTO: 6.1 % (ref 0.3–6.2)
ERYTHROCYTE [DISTWIDTH] IN BLOOD BY AUTOMATED COUNT: 11.7 % (ref 12.3–15.4)
GLUCOSE 1H P 50 G GLC PO SERPL-MCNC: 127 MG/DL (ref 65–139)
HCT VFR BLD AUTO: 28.8 % (ref 34–46.6)
HGB BLD-MCNC: 9.7 G/DL (ref 12–15.9)
IMM GRANULOCYTES # BLD AUTO: 0.05 10*3/MM3 (ref 0–0.05)
IMM GRANULOCYTES NFR BLD AUTO: 0.5 % (ref 0–0.5)
LYMPHOCYTES # BLD AUTO: 1.75 10*3/MM3 (ref 0.7–3.1)
LYMPHOCYTES NFR BLD AUTO: 18.2 % (ref 19.6–45.3)
MCH RBC QN AUTO: 30.2 PG (ref 26.6–33)
MCHC RBC AUTO-ENTMCNC: 33.7 G/DL (ref 31.5–35.7)
MCV RBC AUTO: 89.7 FL (ref 79–97)
MONOCYTES # BLD AUTO: 0.47 10*3/MM3 (ref 0.1–0.9)
MONOCYTES NFR BLD AUTO: 4.9 % (ref 5–12)
NEUTROPHILS # BLD AUTO: 6.71 10*3/MM3 (ref 1.7–7)
NEUTROPHILS NFR BLD AUTO: 69.6 % (ref 42.7–76)
NRBC BLD AUTO-RTO: 0 /100 WBC (ref 0–0.2)
PLATELET # BLD AUTO: 326 10*3/MM3 (ref 140–450)
RBC # BLD AUTO: 3.21 10*6/MM3 (ref 3.77–5.28)
WBC # BLD AUTO: 9.64 10*3/MM3 (ref 3.4–10.8)

## 2020-11-17 PROCEDURE — 99213 OFFICE O/P EST LOW 20 MIN: CPT | Performed by: OBSTETRICS & GYNECOLOGY

## 2020-11-17 NOTE — PROGRESS NOTES
Chief Complaint   Patient presents with   • Routine Prenatal Visit     Glucola done today, no complaints.       HPI:   Tea is a  currently at 26w6d who today reports the following:  Contractions - No; Leaking - No; Vaginal bleeding -  No; Swelling of extremities - No. Good fetal movement - YES.    ROS:  GI: Nausea - No; Constipation - No; Diarrhea - No. RUQ pain - No    Neuro: Headache - No; Visual disturbances - No.    Pertinent items are noted in HPI, all other systems reviewed and negative    Review of History:  The following portions of the patient's history were reviewed and updated as appropriate:problem list, current medications, allergies, past family history, past medical history, past social history and past surgical history.    Current Outpatient Medications on File Prior to Visit   Medication Sig Dispense Refill   • PRENATAL GUMMY VITAMIN Chew 1 each Daily.       No current facility-administered medications on file prior to visit.        EXAM:  /64   Wt 83 kg (183 lb)   LMP 2020   BMI 32.42 kg/m²     Gen: NAD, conversant  Pulm: No use of accessory muscles, normal respirations  Abdomen: Gravid, nontender, size = dates, + fetal cardiac activity  Ext: no edema, no rashes, WWP  Gait: normal for pregnancy  Psych: Mood, insight, judgement intact  SVE: Not performed     Lab Results   Component Value Date    ABO O 2020    RH Positive 2020    ABSCRN Negative 2020       Social History    Tobacco Use      Smoking status: Never Smoker      Smokeless tobacco: Never Used      I have reviewed the prenatal labs and previous ultrasounds today.    MDM:  Diagnosis: Supervision of low risk pregnancy   Tests/Orders/Rx today: Orders Placed This Encounter   Procedures   • Gestational Screen 1 Hr ([facility])   • CBC & Differential     Order Specific Question:   Manual Differential     Answer:   No     Meds Ordered: none   Topics discussed: Prenatal care milestones   Tests next visit: none    Next visit: 4 week(s)     Thong Culver MD  Obstetrics and Gynecology  [facility]

## 2020-11-18 DIAGNOSIS — D50.9 IRON DEFICIENCY ANEMIA DURING PREGNANCY: Primary | ICD-10-CM

## 2020-11-18 DIAGNOSIS — O99.019 IRON DEFICIENCY ANEMIA DURING PREGNANCY: Primary | ICD-10-CM

## 2020-11-18 RX ORDER — FERROUS SULFATE 325(65) MG
325 TABLET ORAL
Qty: 60 TABLET | Refills: 6 | Status: SHIPPED | OUTPATIENT
Start: 2020-11-18 | End: 2021-12-05 | Stop reason: HOSPADM

## 2020-12-07 ENCOUNTER — ROUTINE PRENATAL (OUTPATIENT)
Dept: OBSTETRICS AND GYNECOLOGY | Facility: CLINIC | Age: 30
End: 2020-12-07

## 2020-12-07 VITALS — SYSTOLIC BLOOD PRESSURE: 120 MMHG | DIASTOLIC BLOOD PRESSURE: 68 MMHG | WEIGHT: 187 LBS | BODY MASS INDEX: 33.13 KG/M2

## 2020-12-07 DIAGNOSIS — Z34.93 NORMAL PREGNANCY, THIRD TRIMESTER: Primary | ICD-10-CM

## 2020-12-07 PROCEDURE — 99213 OFFICE O/P EST LOW 20 MIN: CPT | Performed by: NURSE PRACTITIONER

## 2020-12-07 NOTE — PROGRESS NOTES
[id number]  Chief Complaint   Patient presents with   • Routine Prenatal Visit     No compalints        HPI  Tea is a  currently at 29w5d who today reports the following:    Doing well - good FM   Taking iron as prescribed     Contractions - No;   Leaking - No;   Vaginal bleeding -  No;   Swelling of extremities - No.       ROS:        GI: Nausea - No;   Constipation - No;   Diarrhea - No    Neuro: Headache - No;   Visual change - No    Pertinent items are noted in HPI, all other systems reviewed and negative     Social History     Tobacco Use   • Smoking status: Never Smoker   • Smokeless tobacco: Never Used   Substance Use Topics   • Alcohol use: No   • Drug use: No          EXAM  /68   Wt 84.8 kg (187 lb)   LMP 2020   BMI 33.13 kg/m²  -See Prenatal Assessment  General Appearance:  Pleasant  Lungs: Breathing unlabored  Abdomen:  See flow sheet for Fundal ht, FM, FHT's  LE: Neg edema  V/E: Not performed     Lab Results   Component Value Date    ABO O 2020    RH Positive 2020    ABSCRN Negative 2020       MDM  Impression: Supervision of normal pregnancy   Tests done today: none   Topics discussed: continue to note good FM  Flu vaccination  T-dap    Anemia / iron / foods high in iron  encouraged questions - call prn    Tests next visit: U/S          Current Outpatient Medications:   •  ferrous sulfate 325 (65 FE) MG tablet, Take 1 tablet by mouth 2 (Two) Times a Day Before Meals., Disp: 60 tablet, Rfl: 6  •  PRENATAL GUMMY VITAMIN, Chew 1 each Daily., Disp: , Rfl:     Past Surgical History:   Procedure Laterality Date   • NO PAST SURGERIES     • TONSILLECTOMY AND ADENOIDECTOMY         OB History        3    Para   2    Term   2            AB        Living   2       SAB        TAB        Ectopic        Molar        Multiple        Live Births   2                Past Medical History:   Diagnosis Date   • Patient denies medical problems        History reviewed. No  pertinent family history.

## 2020-12-23 ENCOUNTER — ROUTINE PRENATAL (OUTPATIENT)
Dept: OBSTETRICS AND GYNECOLOGY | Facility: CLINIC | Age: 30
End: 2020-12-23

## 2020-12-23 VITALS — BODY MASS INDEX: 33.66 KG/M2 | DIASTOLIC BLOOD PRESSURE: 64 MMHG | SYSTOLIC BLOOD PRESSURE: 122 MMHG | WEIGHT: 190 LBS

## 2020-12-23 DIAGNOSIS — Z36.89 ENCOUNTER FOR ULTRASOUND TO ASSESS FETAL GROWTH: ICD-10-CM

## 2020-12-23 DIAGNOSIS — Z34.93 PRENATAL CARE IN THIRD TRIMESTER: Primary | ICD-10-CM

## 2020-12-23 DIAGNOSIS — O36.63X0 EXCESSIVE FETAL GROWTH AFFECTING MANAGEMENT OF PREGNANCY IN THIRD TRIMESTER, SINGLE OR UNSPECIFIED FETUS: ICD-10-CM

## 2020-12-23 PROCEDURE — 99213 OFFICE O/P EST LOW 20 MIN: CPT | Performed by: OBSTETRICS & GYNECOLOGY

## 2020-12-23 NOTE — PROGRESS NOTES
Chief Complaint   Patient presents with   • Routine Prenatal Visit     Shortness of breath, feels like she can't catch her breath.       HPI:   Tea is a  currently at 32w0d who today reports the following:  Contractions - No; Leaking - No; Vaginal bleeding -  No; Swelling of extremities - No. Good fetal movement - YES.    ROS:  GI: Nausea - No; Constipation - No; Diarrhea - No. RUQ pain - No    Neuro: Headache - No; Visual disturbances - No.    Pertinent items are noted in HPI, all other systems reviewed and negative    Review of History:  The following portions of the patient's history were reviewed and updated as appropriate:problem list, current medications, allergies, past family history, past medical history, past social history and past surgical history.    Current Outpatient Medications on File Prior to Visit   Medication Sig Dispense Refill   • ferrous sulfate 325 (65 FE) MG tablet Take 1 tablet by mouth 2 (Two) Times a Day Before Meals. 60 tablet 6   • PRENATAL GUMMY VITAMIN Chew 1 each Daily.       No current facility-administered medications on file prior to visit.        EXAM:  /64   Wt 86.2 kg (190 lb)   LMP 2020   BMI 33.66 kg/m²     Gen: NAD, conversant  Pulm: No use of accessory muscles, normal respirations  Abdomen: Gravid, nontender, size = dates, + fetal cardiac activity  Ext: no edema, no rashes, WWP  Gait: normal for pregnancy  Psych: Mood, insight, judgement intact  SVE: Not performed     Lab Results   Component Value Date    ABO O 2020    RH Positive 2020    ABSCRN Negative 2020       Social History    Tobacco Use      Smoking status: Never Smoker      Smokeless tobacco: Never Used      I have reviewed the prenatal labs and previous ultrasounds today.    MDM:  Diagnosis: Supervision of high risk pregnancy   LGA   Tests/Orders/Rx today: Orders Placed This Encounter   Procedures   • US Ob Follow Up Transabdominal Approach     Order Specific Question:    Reason for Exam:     Answer:   growth scan     Meds Ordered: none   Topics discussed: Prenatal care milestones   U/S findings  Plan for repeat growth at 36 weeks  Breathing symptoms are mild and likely not due to true respiratory etiology, likely due to fetal size, expectant management for now, call/return precautions reviewed   Tests next visit: none   Next visit: 2 week(s)     Thong Culver MD  Obstetrics and Gynecology  [facility]

## 2021-01-06 ENCOUNTER — ROUTINE PRENATAL (OUTPATIENT)
Dept: OBSTETRICS AND GYNECOLOGY | Facility: CLINIC | Age: 31
End: 2021-01-06

## 2021-01-06 VITALS — WEIGHT: 195 LBS | BODY MASS INDEX: 34.54 KG/M2 | DIASTOLIC BLOOD PRESSURE: 78 MMHG | SYSTOLIC BLOOD PRESSURE: 122 MMHG

## 2021-01-06 DIAGNOSIS — O36.63X0 EXCESSIVE FETAL GROWTH AFFECTING MANAGEMENT OF PREGNANCY IN THIRD TRIMESTER, SINGLE OR UNSPECIFIED FETUS: Primary | ICD-10-CM

## 2021-01-06 PROCEDURE — 99212 OFFICE O/P EST SF 10 MIN: CPT | Performed by: OBSTETRICS & GYNECOLOGY

## 2021-01-06 NOTE — PROGRESS NOTES
Chief Complaint   Patient presents with   • Routine Prenatal Visit     Patient c/o pelvic pressure        HPI:   , 34w0d gestation reports some u.c.'s without VB'ing since last evening    ROS:  See Prenatal Episode/Flowsheet  /78   Wt 88.5 kg (195 lb)   LMP 2020   BMI 34.54 kg/m²      EXAM:  EXTREMITIES:  No swelling-See Prenatal Episode/Flowsheet    ABDOMEN:  FHTs/Movement noted-See Prenatal Episode/Flowsheet    URINE GLUCOSE/PROTEIN:  See Prenatal Episode/Flowsheet    PELVIC EXAM:  See Prenatal Episode/Flowsheet  CV:  Lungs:  GYN:    MDM:    Lab Results   Component Value Date    HGB 9.7 (L) 2020    RUBELLAABIGG 2.14 2020    HEPBSAG Negative 2020    ABO O 2020    RH Positive 2020    ABSCRN Negative 2020    [id number] Non Reactive 2020    HEPCVIRUSABY <0.1 2020       U/S:    1. IUP 34w0d  2. Routine care   3. Prior Term  x 2 with current mild crmaping since last evening   Cervix cl.thick, head low  4. LGA-- U/S already set

## 2021-01-20 ENCOUNTER — ROUTINE PRENATAL (OUTPATIENT)
Dept: OBSTETRICS AND GYNECOLOGY | Facility: CLINIC | Age: 31
End: 2021-01-20

## 2021-01-20 VITALS — BODY MASS INDEX: 35.61 KG/M2 | DIASTOLIC BLOOD PRESSURE: 72 MMHG | SYSTOLIC BLOOD PRESSURE: 118 MMHG | WEIGHT: 201 LBS

## 2021-01-20 DIAGNOSIS — Z36.89 ENCOUNTER FOR ULTRASOUND TO CHECK FETAL GROWTH: ICD-10-CM

## 2021-01-20 DIAGNOSIS — O36.80X0 ENCOUNTER TO DETERMINE FETAL VIABILITY OF PREGNANCY, SINGLE OR UNSPECIFIED FETUS: Primary | ICD-10-CM

## 2021-01-20 PROCEDURE — 99212 OFFICE O/P EST SF 10 MIN: CPT | Performed by: OBSTETRICS & GYNECOLOGY

## 2021-01-20 NOTE — PROGRESS NOTES
Chief Complaint   Patient presents with   • Routine Prenatal Visit     Growth scan done for EFW, GBS done, No C/C         HPI:   , 36w0d gestation reports doing well    ROS:  See Prenatal Episode/Flowsheet  /72   Wt 91.2 kg (201 lb)   LMP 2020   BMI 35.61 kg/m²      EXAM:  EXTREMITIES:  No swelling-See Prenatal Episode/Flowsheet    ABDOMEN:  FHTs/Movement noted-See Prenatal Episode/Flowsheet    URINE GLUCOSE/PROTEIN:  See Prenatal Episode/Flowsheet    PELVIC EXAM:  See Prenatal Episode/Flowsheet  CV:  Lungs:  GYN:    MDM:    Lab Results   Component Value Date    HGB 9.7 (L) 2020    RUBELLAABIGG 2.14 2020    HEPBSAG Negative 2020    ABO O 2020    RH Positive 2020    ABSCRN Negative 2020    [id number] Non Reactive 2020    HEPCVIRUSABY <0.1 2020       U/S: 97%, MINO 12.4, vertex, anterior placenta, active fetus    1. IUP 36w0d  2. Routine care   3. Proven to 8#7  4. Check cervix next week  5. Anemia: taking Fe and PNV

## 2021-01-23 LAB — GP B STREP DNA SPEC QL NAA+PROBE: NEGATIVE

## 2021-01-27 ENCOUNTER — ROUTINE PRENATAL (OUTPATIENT)
Dept: OBSTETRICS AND GYNECOLOGY | Facility: CLINIC | Age: 31
End: 2021-01-27

## 2021-01-27 VITALS — WEIGHT: 202 LBS | SYSTOLIC BLOOD PRESSURE: 114 MMHG | DIASTOLIC BLOOD PRESSURE: 78 MMHG | BODY MASS INDEX: 35.78 KG/M2

## 2021-01-27 DIAGNOSIS — Z34.93 PRENATAL CARE IN THIRD TRIMESTER: Primary | ICD-10-CM

## 2021-01-27 DIAGNOSIS — O36.63X0 EXCESSIVE FETAL GROWTH AFFECTING MANAGEMENT OF PREGNANCY IN THIRD TRIMESTER, SINGLE OR UNSPECIFIED FETUS: ICD-10-CM

## 2021-01-27 PROCEDURE — 99213 OFFICE O/P EST LOW 20 MIN: CPT | Performed by: OBSTETRICS & GYNECOLOGY

## 2021-01-28 NOTE — PROGRESS NOTES
Prenatal Care Visit    Subjective   Chief Complaint   Patient presents with   • Routine Prenatal Visit     No complaints       History:   Tea is a  currently at 37w0d who presents for a prenatal care visit today.    No problems.    Social History    Tobacco Use      Smoking status: Never Smoker      Smokeless tobacco: Never Used       Objective   /78   Wt 91.6 kg (202 lb)   LMP 2020   BMI 35.78 kg/m²   Physical Exam:  Normal, gestational age-appropriate exam today   SVE 0/0/-3       Plan   Medical Decision Making:    I have reviewed the prenatal labs and ultrasound(s) today. I have reviewed the most recent prenatal progress note(s).    Diagnosis: Supervision of high risk pregnancy   S>D   Tests/Orders/Rx today: No orders of the defined types were placed in this encounter.      Medication Management: none     Topics discussed: Prenatal care milestones  induction of labor  kick counts and fetal movement  labor signs and symptoms  PIH precautions   Plan for repeat EFW at 39 weeks   Tests next visit: none   Next visit: 1 week(s)     Thong Culver MD  Obstetrics and Gynecology  [facility]

## 2021-02-03 ENCOUNTER — ROUTINE PRENATAL (OUTPATIENT)
Dept: OBSTETRICS AND GYNECOLOGY | Facility: CLINIC | Age: 31
End: 2021-02-03

## 2021-02-03 VITALS — BODY MASS INDEX: 36.31 KG/M2 | WEIGHT: 205 LBS | SYSTOLIC BLOOD PRESSURE: 128 MMHG | DIASTOLIC BLOOD PRESSURE: 90 MMHG

## 2021-02-03 DIAGNOSIS — Z34.93 PRENATAL CARE IN THIRD TRIMESTER: Primary | ICD-10-CM

## 2021-02-03 DIAGNOSIS — R03.0 ELEVATED BLOOD PRESSURE READING: ICD-10-CM

## 2021-02-03 DIAGNOSIS — O36.63X0 EXCESSIVE FETAL GROWTH AFFECTING MANAGEMENT OF PREGNANCY IN THIRD TRIMESTER, SINGLE OR UNSPECIFIED FETUS: ICD-10-CM

## 2021-02-03 PROCEDURE — 99213 OFFICE O/P EST LOW 20 MIN: CPT | Performed by: OBSTETRICS & GYNECOLOGY

## 2021-02-04 ENCOUNTER — ANESTHESIA (OUTPATIENT)
Dept: LABOR AND DELIVERY | Facility: HOSPITAL | Age: 31
End: 2021-02-04

## 2021-02-04 ENCOUNTER — HOSPITAL ENCOUNTER (OUTPATIENT)
Dept: LABOR AND DELIVERY | Facility: HOSPITAL | Age: 31
Discharge: HOME | End: 2021-02-04

## 2021-02-04 ENCOUNTER — ANESTHESIA EVENT (OUTPATIENT)
Dept: LABOR AND DELIVERY | Facility: HOSPITAL | Age: 31
End: 2021-02-04

## 2021-02-04 ENCOUNTER — PREP FOR SURGERY (OUTPATIENT)
Dept: OTHER | Facility: HOSPITAL | Age: 31
End: 2021-02-04

## 2021-02-04 ENCOUNTER — HOSPITAL ENCOUNTER (INPATIENT)
Facility: HOSPITAL | Age: 31
LOS: 2 days | Discharge: HOME | End: 2021-02-06
Attending: NURSE PRACTITIONER | Admitting: OBSTETRICS & GYNECOLOGY

## 2021-02-04 ENCOUNTER — ROUTINE PRENATAL (OUTPATIENT)
Dept: OBSTETRICS AND GYNECOLOGY | Facility: CLINIC | Age: 31
End: 2021-02-04

## 2021-02-04 VITALS — DIASTOLIC BLOOD PRESSURE: 92 MMHG | BODY MASS INDEX: 36.49 KG/M2 | SYSTOLIC BLOOD PRESSURE: 140 MMHG | WEIGHT: 206 LBS

## 2021-02-04 DIAGNOSIS — O36.63X0 EXCESSIVE FETAL GROWTH AFFECTING MANAGEMENT OF PREGNANCY IN THIRD TRIMESTER, SINGLE OR UNSPECIFIED FETUS: ICD-10-CM

## 2021-02-04 DIAGNOSIS — O13.3 GESTATIONAL HTN, THIRD TRIMESTER: Primary | ICD-10-CM

## 2021-02-04 DIAGNOSIS — O13.3 GESTATIONAL HTN, THIRD TRIMESTER: ICD-10-CM

## 2021-02-04 DIAGNOSIS — Z34.93 PRENATAL CARE IN THIRD TRIMESTER: Primary | ICD-10-CM

## 2021-02-04 LAB
ABO GROUP BLD: NORMAL
ABO GROUP BLD: NORMAL
ALBUMIN SERPL-MCNC: 3.4 G/DL (ref 3.5–5.2)
ALBUMIN SERPL-MCNC: 3.5 G/DL (ref 3.5–5.2)
ALBUMIN/GLOB SERPL: 1.3 G/DL
ALBUMIN/GLOB SERPL: 1.5 G/DL
ALP SERPL-CCNC: 106 U/L (ref 39–117)
ALP SERPL-CCNC: 107 U/L (ref 39–117)
ALT SERPL W P-5'-P-CCNC: 17 U/L (ref 1–33)
ALT SERPL-CCNC: 18 U/L (ref 1–33)
ANION GAP SERPL CALCULATED.3IONS-SCNC: 12.3 MMOL/L (ref 5–15)
AST SERPL-CCNC: 20 U/L (ref 1–32)
AST SERPL-CCNC: 25 U/L (ref 1–32)
BASOPHILS # BLD AUTO: 0.04 10*3/MM3 (ref 0–0.2)
BASOPHILS # BLD AUTO: 0.06 10*3/MM3 (ref 0–0.2)
BASOPHILS NFR BLD AUTO: 0.4 % (ref 0–1.5)
BASOPHILS NFR BLD AUTO: 0.5 % (ref 0–1.5)
BILIRUB BLD-MCNC: NEGATIVE MG/DL
BILIRUB SERPL-MCNC: <0.2 MG/DL (ref 0–1.2)
BILIRUB SERPL-MCNC: <0.2 MG/DL (ref 0–1.2)
BLD GP AB SCN SERPL QL: NEGATIVE
BUN SERPL-MCNC: 6 MG/DL (ref 6–20)
BUN SERPL-MCNC: 7 MG/DL (ref 6–20)
BUN/CREAT SERPL: 11.5 (ref 7–25)
BUN/CREAT SERPL: 13.2 (ref 7–25)
CALCIUM SERPL-MCNC: 9.1 MG/DL (ref 8.6–10.5)
CALCIUM SPEC-SCNC: 9.1 MG/DL (ref 8.6–10.5)
CHLORIDE SERPL-SCNC: 100 MMOL/L (ref 98–107)
CHLORIDE SERPL-SCNC: 99 MMOL/L (ref 98–107)
CLARITY, POC: CLEAR
CO2 SERPL-SCNC: 21.7 MMOL/L (ref 22–29)
CO2 SERPL-SCNC: 24.2 MMOL/L (ref 22–29)
COLOR UR: YELLOW
CREAT SERPL-MCNC: 0.52 MG/DL (ref 0.57–1)
CREAT SERPL-MCNC: 0.53 MG/DL (ref 0.57–1)
CREAT UR-MCNC: 40.5 MG/DL
DEPRECATED RDW RBC AUTO: 40.6 FL (ref 37–54)
EOSINOPHIL # BLD AUTO: 0.22 10*3/MM3 (ref 0–0.4)
EOSINOPHIL # BLD AUTO: 0.32 10*3/MM3 (ref 0–0.4)
EOSINOPHIL NFR BLD AUTO: 2 % (ref 0.3–6.2)
EOSINOPHIL NFR BLD AUTO: 2.6 % (ref 0.3–6.2)
ERYTHROCYTE [DISTWIDTH] IN BLOOD BY AUTOMATED COUNT: 12.1 % (ref 12.3–15.4)
ERYTHROCYTE [DISTWIDTH] IN BLOOD BY AUTOMATED COUNT: 12.1 % (ref 12.3–15.4)
GFR SERPL CREATININE-BSD FRML MDRD: 134 ML/MIN/1.73
GLOBULIN SER CALC-MCNC: 2.6 GM/DL
GLOBULIN UR ELPH-MCNC: 2.4 GM/DL
GLUCOSE SERPL-MCNC: 109 MG/DL (ref 65–99)
GLUCOSE SERPL-MCNC: 69 MG/DL (ref 65–99)
GLUCOSE UR STRIP-MCNC: NEGATIVE MG/DL
HCT VFR BLD AUTO: 32.6 % (ref 34–46.6)
HCT VFR BLD AUTO: 35.2 % (ref 34–46.6)
HGB BLD-MCNC: 11.3 G/DL (ref 12–15.9)
HGB BLD-MCNC: 12 G/DL (ref 12–15.9)
IMM GRANULOCYTES # BLD AUTO: 0.06 10*3/MM3 (ref 0–0.05)
IMM GRANULOCYTES # BLD AUTO: 0.09 10*3/MM3 (ref 0–0.05)
IMM GRANULOCYTES NFR BLD AUTO: 0.6 % (ref 0–0.5)
IMM GRANULOCYTES NFR BLD AUTO: 0.7 % (ref 0–0.5)
KETONES UR QL: NEGATIVE
LEUKOCYTE EST, POC: NEGATIVE
LYMPHOCYTES # BLD AUTO: 2.06 10*3/MM3 (ref 0.7–3.1)
LYMPHOCYTES # BLD AUTO: 2.46 10*3/MM3 (ref 0.7–3.1)
LYMPHOCYTES NFR BLD AUTO: 19 % (ref 19.6–45.3)
LYMPHOCYTES NFR BLD AUTO: 20.2 % (ref 19.6–45.3)
MCH RBC QN AUTO: 31 PG (ref 26.6–33)
MCH RBC QN AUTO: 31.7 PG (ref 26.6–33)
MCHC RBC AUTO-ENTMCNC: 34.1 G/DL (ref 31.5–35.7)
MCHC RBC AUTO-ENTMCNC: 34.7 G/DL (ref 31.5–35.7)
MCV RBC AUTO: 91 FL (ref 79–97)
MCV RBC AUTO: 91.3 FL (ref 79–97)
MONOCYTES # BLD AUTO: 0.46 10*3/MM3 (ref 0.1–0.9)
MONOCYTES # BLD AUTO: 0.8 10*3/MM3 (ref 0.1–0.9)
MONOCYTES NFR BLD AUTO: 4.2 % (ref 5–12)
MONOCYTES NFR BLD AUTO: 6.6 % (ref 5–12)
NEUTROPHILS # BLD AUTO: 8.42 10*3/MM3 (ref 1.7–7)
NEUTROPHILS NFR BLD AUTO: 69.4 % (ref 42.7–76)
NEUTROPHILS NFR BLD AUTO: 7.99 10*3/MM3 (ref 1.7–7)
NEUTROPHILS NFR BLD AUTO: 73.8 % (ref 42.7–76)
NITRITE UR-MCNC: NEGATIVE MG/ML
NRBC BLD AUTO-RTO: 0 /100 WBC (ref 0–0.2)
NRBC BLD AUTO-RTO: 0 /100 WBC (ref 0–0.2)
PH UR: 6 [PH] (ref 5–8)
PLATELET # BLD AUTO: 272 10*3/MM3 (ref 140–450)
PLATELET # BLD AUTO: 311 10*3/MM3 (ref 140–450)
PMV BLD AUTO: 11 FL (ref 6–12)
POTASSIUM SERPL-SCNC: 4 MMOL/L (ref 3.5–5.2)
POTASSIUM SERPL-SCNC: 4.2 MMOL/L (ref 3.5–5.2)
PROT SERPL-MCNC: 5.9 G/DL (ref 6–8.5)
PROT SERPL-MCNC: 6 G/DL (ref 6–8.5)
PROT UR STRIP-MCNC: NEGATIVE MG/DL
PROT UR-MCNC: 7.6 MG/DL
PROT/CREAT UR: 187.7 MG/G CREA (ref 0–200)
RBC # BLD AUTO: 3.57 10*6/MM3 (ref 3.77–5.28)
RBC # BLD AUTO: 3.87 10*6/MM3 (ref 3.77–5.28)
RBC # UR STRIP: NEGATIVE /UL
RH BLD: POSITIVE
RH BLD: POSITIVE
SARS-COV-2 RNA PNL SPEC NAA+PROBE: NOT DETECTED
SODIUM SERPL-SCNC: 134 MMOL/L (ref 136–145)
SODIUM SERPL-SCNC: 135 MMOL/L (ref 136–145)
SP GR UR: 1 (ref 1–1.03)
T&S EXPIRATION DATE: NORMAL
UROBILINOGEN UR QL: NORMAL
WBC # BLD AUTO: 10.83 10*3/MM3 (ref 3.4–10.8)
WBC # BLD AUTO: 12.15 10*3/MM3 (ref 3.4–10.8)

## 2021-02-04 PROCEDURE — 87635 SARS-COV-2 COVID-19 AMP PRB: CPT | Performed by: NURSE PRACTITIONER

## 2021-02-04 PROCEDURE — 80053 COMPREHEN METABOLIC PANEL: CPT | Performed by: OBSTETRICS & GYNECOLOGY

## 2021-02-04 PROCEDURE — G0463 HOSPITAL OUTPT CLINIC VISIT: HCPCS

## 2021-02-04 PROCEDURE — 86900 BLOOD TYPING SEROLOGIC ABO: CPT | Performed by: OBSTETRICS & GYNECOLOGY

## 2021-02-04 PROCEDURE — 3E033VJ INTRODUCTION OF OTHER HORMONE INTO PERIPHERAL VEIN, PERCUTANEOUS APPROACH: ICD-10-PCS | Performed by: NURSE PRACTITIONER

## 2021-02-04 PROCEDURE — 59025 FETAL NON-STRESS TEST: CPT

## 2021-02-04 PROCEDURE — 86901 BLOOD TYPING SEROLOGIC RH(D): CPT

## 2021-02-04 PROCEDURE — 86850 RBC ANTIBODY SCREEN: CPT | Performed by: OBSTETRICS & GYNECOLOGY

## 2021-02-04 PROCEDURE — 86901 BLOOD TYPING SEROLOGIC RH(D): CPT | Performed by: OBSTETRICS & GYNECOLOGY

## 2021-02-04 PROCEDURE — 99214 OFFICE O/P EST MOD 30 MIN: CPT | Performed by: OBSTETRICS & GYNECOLOGY

## 2021-02-04 PROCEDURE — 86900 BLOOD TYPING SEROLOGIC ABO: CPT

## 2021-02-04 PROCEDURE — 81002 URINALYSIS NONAUTO W/O SCOPE: CPT | Performed by: OBSTETRICS & GYNECOLOGY

## 2021-02-04 PROCEDURE — 85025 COMPLETE CBC W/AUTO DIFF WBC: CPT | Performed by: OBSTETRICS & GYNECOLOGY

## 2021-02-04 PROCEDURE — 59025 FETAL NON-STRESS TEST: CPT | Performed by: NURSE PRACTITIONER

## 2021-02-04 PROCEDURE — S0260 H&P FOR SURGERY: HCPCS | Performed by: NURSE PRACTITIONER

## 2021-02-04 RX ORDER — MORPHINE SULFATE 4 MG/ML
4 INJECTION, SOLUTION INTRAMUSCULAR; INTRAVENOUS ONCE AS NEEDED
Status: DISCONTINUED | OUTPATIENT
Start: 2021-02-04 | End: 2021-02-05 | Stop reason: HOSPADM

## 2021-02-04 RX ORDER — CARBOPROST TROMETHAMINE 250 UG/ML
250 INJECTION, SOLUTION INTRAMUSCULAR AS NEEDED
Status: CANCELLED | OUTPATIENT
Start: 2021-02-04

## 2021-02-04 RX ORDER — SODIUM CHLORIDE, SODIUM LACTATE, POTASSIUM CHLORIDE, CALCIUM CHLORIDE 600; 310; 30; 20 MG/100ML; MG/100ML; MG/100ML; MG/100ML
125 INJECTION, SOLUTION INTRAVENOUS CONTINUOUS
Status: CANCELLED | OUTPATIENT
Start: 2021-02-04

## 2021-02-04 RX ORDER — SODIUM CHLORIDE 0.9 % (FLUSH) 0.9 %
3-10 SYRINGE (ML) INJECTION AS NEEDED
Status: DISCONTINUED | OUTPATIENT
Start: 2021-02-04 | End: 2021-02-05 | Stop reason: HOSPADM

## 2021-02-04 RX ORDER — MORPHINE SULFATE 2 MG/ML
6 INJECTION, SOLUTION INTRAMUSCULAR; INTRAVENOUS EVERY 4 HOURS PRN
Status: CANCELLED | OUTPATIENT
Start: 2021-02-04 | End: 2021-02-14

## 2021-02-04 RX ORDER — MISOPROSTOL 200 UG/1
800 TABLET ORAL AS NEEDED
Status: CANCELLED | OUTPATIENT
Start: 2021-02-04

## 2021-02-04 RX ORDER — ONDANSETRON 4 MG/1
4 TABLET, FILM COATED ORAL ONCE AS NEEDED
Status: DISCONTINUED | OUTPATIENT
Start: 2021-02-04 | End: 2021-02-05 | Stop reason: HOSPADM

## 2021-02-04 RX ORDER — OXYTOCIN-SODIUM CHLORIDE 0.9% IV SOLN 30 UNIT/500ML 30-0.9/5 UT/ML-%
650 SOLUTION INTRAVENOUS ONCE
Status: CANCELLED | OUTPATIENT
Start: 2021-02-04 | End: 2021-02-04

## 2021-02-04 RX ORDER — OXYTOCIN/0.9 % SODIUM CHLORIDE 30/500 ML
1-20 PLASTIC BAG, INJECTION (ML) INTRAVENOUS
Status: DISCONTINUED | OUTPATIENT
Start: 2021-02-04 | End: 2021-02-05 | Stop reason: HOSPADM

## 2021-02-04 RX ORDER — MISOPROSTOL 100 MCG
25 TABLET ORAL
Status: DISCONTINUED | OUTPATIENT
Start: 2021-02-04 | End: 2021-02-05

## 2021-02-04 RX ORDER — ACETAMINOPHEN 325 MG/1
650 TABLET ORAL ONCE AS NEEDED
Status: DISCONTINUED | OUTPATIENT
Start: 2021-02-04 | End: 2021-02-05 | Stop reason: HOSPADM

## 2021-02-04 RX ORDER — HYDROCODONE BITARTRATE AND ACETAMINOPHEN 5; 325 MG/1; MG/1
2 TABLET ORAL ONCE AS NEEDED
Status: DISCONTINUED | OUTPATIENT
Start: 2021-02-04 | End: 2021-02-05 | Stop reason: HOSPADM

## 2021-02-04 RX ORDER — MORPHINE SULFATE 2 MG/ML
2 INJECTION, SOLUTION INTRAMUSCULAR; INTRAVENOUS ONCE AS NEEDED
Status: CANCELLED | OUTPATIENT
Start: 2021-02-04

## 2021-02-04 RX ORDER — ONDANSETRON 4 MG/1
4 TABLET, FILM COATED ORAL ONCE AS NEEDED
Status: CANCELLED | OUTPATIENT
Start: 2021-02-04

## 2021-02-04 RX ORDER — MORPHINE SULFATE 4 MG/ML
4 INJECTION, SOLUTION INTRAMUSCULAR; INTRAVENOUS EVERY 4 HOURS PRN
Status: CANCELLED | OUTPATIENT
Start: 2021-02-04 | End: 2021-02-14

## 2021-02-04 RX ORDER — OXYTOCIN/0.9 % SODIUM CHLORIDE 30/500 ML
85 PLASTIC BAG, INJECTION (ML) INTRAVENOUS ONCE
Status: DISCONTINUED | OUTPATIENT
Start: 2021-02-04 | End: 2021-02-05 | Stop reason: HOSPADM

## 2021-02-04 RX ORDER — MISOPROSTOL 200 UG/1
800 TABLET ORAL AS NEEDED
Status: DISCONTINUED | OUTPATIENT
Start: 2021-02-04 | End: 2021-02-05 | Stop reason: HOSPADM

## 2021-02-04 RX ORDER — LIDOCAINE HYDROCHLORIDE 10 MG/ML
5 INJECTION, SOLUTION EPIDURAL; INFILTRATION; INTRACAUDAL; PERINEURAL AS NEEDED
Status: DISCONTINUED | OUTPATIENT
Start: 2021-02-04 | End: 2021-02-05 | Stop reason: HOSPADM

## 2021-02-04 RX ORDER — OXYTOCIN-SODIUM CHLORIDE 0.9% IV SOLN 30 UNIT/500ML 30-0.9/5 UT/ML-%
1-20 SOLUTION INTRAVENOUS
Status: CANCELLED | OUTPATIENT
Start: 2021-02-04

## 2021-02-04 RX ORDER — SODIUM CHLORIDE 0.9 % (FLUSH) 0.9 %
3-10 SYRINGE (ML) INJECTION AS NEEDED
Status: CANCELLED | OUTPATIENT
Start: 2021-02-04

## 2021-02-04 RX ORDER — MORPHINE SULFATE 2 MG/ML
2 INJECTION, SOLUTION INTRAMUSCULAR; INTRAVENOUS ONCE AS NEEDED
Status: DISCONTINUED | OUTPATIENT
Start: 2021-02-04 | End: 2021-02-05 | Stop reason: HOSPADM

## 2021-02-04 RX ORDER — SODIUM CHLORIDE 0.9 % (FLUSH) 0.9 %
3 SYRINGE (ML) INJECTION EVERY 12 HOURS SCHEDULED
Status: DISCONTINUED | OUTPATIENT
Start: 2021-02-04 | End: 2021-02-05 | Stop reason: HOSPADM

## 2021-02-04 RX ORDER — SODIUM CHLORIDE 0.9 % (FLUSH) 0.9 %
3 SYRINGE (ML) INJECTION EVERY 12 HOURS SCHEDULED
Status: CANCELLED | OUTPATIENT
Start: 2021-02-04

## 2021-02-04 RX ORDER — OXYTOCIN/0.9 % SODIUM CHLORIDE 30/500 ML
650 PLASTIC BAG, INJECTION (ML) INTRAVENOUS ONCE
Status: DISCONTINUED | OUTPATIENT
Start: 2021-02-04 | End: 2021-02-05 | Stop reason: HOSPADM

## 2021-02-04 RX ORDER — EPHEDRINE SULFATE 5 MG/ML
5 INJECTION INTRAVENOUS
Status: DISCONTINUED | OUTPATIENT
Start: 2021-02-04 | End: 2021-02-05 | Stop reason: HOSPADM

## 2021-02-04 RX ORDER — SODIUM CHLORIDE, SODIUM LACTATE, POTASSIUM CHLORIDE, CALCIUM CHLORIDE 600; 310; 30; 20 MG/100ML; MG/100ML; MG/100ML; MG/100ML
125 INJECTION, SOLUTION INTRAVENOUS CONTINUOUS
Status: DISCONTINUED | OUTPATIENT
Start: 2021-02-04 | End: 2021-02-05

## 2021-02-04 RX ORDER — ONDANSETRON 2 MG/ML
4 INJECTION INTRAMUSCULAR; INTRAVENOUS ONCE AS NEEDED
Status: DISCONTINUED | OUTPATIENT
Start: 2021-02-04 | End: 2021-02-05 | Stop reason: HOSPADM

## 2021-02-04 RX ORDER — LIDOCAINE HYDROCHLORIDE 10 MG/ML
5 INJECTION, SOLUTION EPIDURAL; INFILTRATION; INTRACAUDAL; PERINEURAL AS NEEDED
Status: CANCELLED | OUTPATIENT
Start: 2021-02-04

## 2021-02-04 RX ORDER — TRISODIUM CITRATE DIHYDRATE AND CITRIC ACID MONOHYDRATE 500; 334 MG/5ML; MG/5ML
30 SOLUTION ORAL ONCE
Status: DISCONTINUED | OUTPATIENT
Start: 2021-02-04 | End: 2021-02-05 | Stop reason: HOSPADM

## 2021-02-04 RX ORDER — HYDROCODONE BITARTRATE AND ACETAMINOPHEN 5; 325 MG/1; MG/1
2 TABLET ORAL ONCE AS NEEDED
Status: CANCELLED | OUTPATIENT
Start: 2021-02-04

## 2021-02-04 RX ORDER — METHYLERGONOVINE MALEATE 0.2 MG/ML
200 INJECTION INTRAVENOUS ONCE AS NEEDED
Status: CANCELLED | OUTPATIENT
Start: 2021-02-04

## 2021-02-04 RX ORDER — MORPHINE SULFATE 4 MG/ML
4 INJECTION, SOLUTION INTRAMUSCULAR; INTRAVENOUS ONCE AS NEEDED
Status: CANCELLED | OUTPATIENT
Start: 2021-02-04

## 2021-02-04 RX ORDER — PROMETHAZINE HYDROCHLORIDE 12.5 MG/1
12.5 TABLET ORAL EVERY 6 HOURS PRN
Status: DISCONTINUED | OUTPATIENT
Start: 2021-02-04 | End: 2021-02-05 | Stop reason: HOSPADM

## 2021-02-04 RX ORDER — ONDANSETRON 2 MG/ML
4 INJECTION INTRAMUSCULAR; INTRAVENOUS ONCE AS NEEDED
Status: CANCELLED | OUTPATIENT
Start: 2021-02-04

## 2021-02-04 RX ORDER — METHYLERGONOVINE MALEATE 0.2 MG/ML
200 INJECTION INTRAVENOUS ONCE AS NEEDED
Status: DISCONTINUED | OUTPATIENT
Start: 2021-02-04 | End: 2021-02-05 | Stop reason: HOSPADM

## 2021-02-04 RX ORDER — PROMETHAZINE HYDROCHLORIDE 12.5 MG/1
12.5 SUPPOSITORY RECTAL EVERY 6 HOURS PRN
Status: DISCONTINUED | OUTPATIENT
Start: 2021-02-04 | End: 2021-02-05 | Stop reason: HOSPADM

## 2021-02-04 RX ORDER — OXYTOCIN-SODIUM CHLORIDE 0.9% IV SOLN 30 UNIT/500ML 30-0.9/5 UT/ML-%
85 SOLUTION INTRAVENOUS ONCE
Status: CANCELLED | OUTPATIENT
Start: 2021-02-04 | End: 2021-02-04

## 2021-02-04 RX ORDER — CARBOPROST TROMETHAMINE 250 UG/ML
250 INJECTION, SOLUTION INTRAMUSCULAR AS NEEDED
Status: DISCONTINUED | OUTPATIENT
Start: 2021-02-04 | End: 2021-02-05 | Stop reason: HOSPADM

## 2021-02-04 RX ORDER — HYDROXYZINE PAMOATE 50 MG/1
50 CAPSULE ORAL ONCE
Status: COMPLETED | OUTPATIENT
Start: 2021-02-04 | End: 2021-02-04

## 2021-02-04 RX ORDER — PROMETHAZINE HYDROCHLORIDE 12.5 MG/1
12.5 TABLET ORAL EVERY 6 HOURS PRN
Status: CANCELLED | OUTPATIENT
Start: 2021-02-04

## 2021-02-04 RX ORDER — MORPHINE SULFATE 2 MG/ML
6 INJECTION, SOLUTION INTRAMUSCULAR; INTRAVENOUS EVERY 4 HOURS PRN
Status: DISCONTINUED | OUTPATIENT
Start: 2021-02-04 | End: 2021-02-05 | Stop reason: HOSPADM

## 2021-02-04 RX ORDER — MORPHINE SULFATE 4 MG/ML
4 INJECTION, SOLUTION INTRAMUSCULAR; INTRAVENOUS EVERY 4 HOURS PRN
Status: DISCONTINUED | OUTPATIENT
Start: 2021-02-04 | End: 2021-02-05 | Stop reason: HOSPADM

## 2021-02-04 RX ORDER — ACETAMINOPHEN 325 MG/1
650 TABLET ORAL ONCE AS NEEDED
Status: CANCELLED | OUTPATIENT
Start: 2021-02-04

## 2021-02-04 RX ORDER — PROMETHAZINE HYDROCHLORIDE 12.5 MG/1
12.5 SUPPOSITORY RECTAL EVERY 6 HOURS PRN
Status: CANCELLED | OUTPATIENT
Start: 2021-02-04

## 2021-02-04 RX ADMIN — SODIUM CHLORIDE, POTASSIUM CHLORIDE, SODIUM LACTATE AND CALCIUM CHLORIDE 125 ML/HR: 600; 310; 30; 20 INJECTION, SOLUTION INTRAVENOUS at 16:20

## 2021-02-04 RX ADMIN — MISOPROSTOL 25 MCG: 100 TABLET ORAL at 20:21

## 2021-02-04 RX ADMIN — SODIUM CHLORIDE, POTASSIUM CHLORIDE, SODIUM LACTATE AND CALCIUM CHLORIDE 1000 ML: 600; 310; 30; 20 INJECTION, SOLUTION INTRAVENOUS at 23:03

## 2021-02-04 RX ADMIN — HYDROXYZINE PAMOATE 50 MG: 50 CAPSULE ORAL at 21:04

## 2021-02-04 NOTE — PROGRESS NOTES
Prenatal Care Visit    Subjective   Chief Complaint   Patient presents with   • Routine Prenatal Visit     Swelling in hands       History:   Tea is a  currently at 38w0d who presents for a prenatal care visit today.    Mild hand swelling.    Social History    Tobacco Use      Smoking status: Never Smoker      Smokeless tobacco: Never Used       Objective   /90   Wt 93 kg (205 lb)   LMP 2020   BMI 36.31 kg/m²   Physical Exam:  Normal, gestational age-appropriate exam today   SVE 0/0/-3       Plan   Medical Decision Making:    I have reviewed the prenatal labs and ultrasound(s) today. I have reviewed the most recent prenatal progress note(s).    Diagnosis: Supervision of high risk pregnancy   S>D  Elevated BPs   Tests/Orders/Rx today: Orders Placed This Encounter   Procedures   • Protein / Creatinine Ratio, Urine - Urine, Clean Catch     Order Specific Question:   [facility] Has the patient fasted?     Answer:   No   • Comprehensive Metabolic Panel     Order Specific Question:   [facility] Has the patient fasted?     Answer:   No   • CBC & Differential     Order Specific Question:   Manual Differential     Answer:   No     Order Specific Question:   [facility] Has the patient fasted?     Answer:   No       Medication Management: none     Topics discussed: Prenatal care milestones  induction of labor  kick counts and fetal movement  labor signs and symptoms  PIH precautions   Plan for repeat EFW in near future   Tests next visit: BP check   Next visit: This week     Thong Culver MD  Obstetrics and Gynecology  [facility]

## 2021-02-04 NOTE — ANESTHESIA PREPROCEDURE EVALUATION
Anesthesia Evaluation     Patient summary reviewed and Nursing notes reviewed   NPO Solid Status: > 8 hours  NPO Liquid Status: > 8 hours           Airway   Mallampati: II  TM distance: >3 FB  Neck ROM: full  No difficulty expected  Dental - normal exam     Pulmonary - normal exam   Cardiovascular - normal exam    (+) hypertension well controlled,       Neuro/Psych  GI/Hepatic/Renal/Endo      Musculoskeletal     Abdominal  - normal exam   Substance History      OB/GYN    (+) Pregnant, Preeclampsia, pregnancy induced hypertension        Other                        Anesthesia Plan    ASA 2 - emergent     epidural       Anesthetic plan, all risks, benefits, and alternatives have been provided, discussed and informed consent has been obtained with: patient.    Plan discussed with CRNA.

## 2021-02-04 NOTE — H&P
" Tavo  Tea Marie  : 1990  MRN: [id number]  CSN: [id number]    Chief Complain:  Induction for gestational hypertension    History and Physical    Tea Marie is a 30 y.o. year old  with an Estimated Date of Delivery: 21 currently at 38w1d presenting for induction of labor for  gestational hypertension.    She was seen in the office today - decision for induction was discussed and she desires to proceed.       [facility].   Prenatal course has been uncomplicated until today due to positive gestational hypertension.  + LGA    TWG 45 pounds   Total visits: 14    She reports her previous deliveries uncomplicated. G2  8#7oz and did not have to push long.    OB History    Para Term  AB Living   3 2 2 0 0 2   SAB TAB Ectopic Molar Multiple Live Births   0 0 0 0 0 2      # Outcome Date GA Lbr Jung/2nd Weight Sex Delivery Anes PTL Lv   3 Current            2 Term 09 38w0d  3827 g (8 lb 7 oz) M Vag-Spont  N PANCHO   1 Term 07 40w0d  3685 g (8 lb 2 oz) M Vag-Spont  N PANCHO     Past Medical History:   Diagnosis Date   • Patient denies medical problems      Past Surgical History:   Procedure Laterality Date   • NO PAST SURGERIES     • TONSILLECTOMY AND ADENOIDECTOMY         Review of Systems        Pertinent items are noted in HPI, all other systems reviewed and negative  Allergies   Allergen Reactions   • Ceclor [Cefaclor] Rash     Social History    Tobacco Use      Smoking status: Never Smoker      Smokeless tobacco: Never Used      /79 (BP Location: Right arm, Patient Position: Lying)   Pulse 91   Temp 99 °F (37.2 °C) (Oral)   Resp 16   Ht 160 cm (62.99\")   Wt 93.4 kg (205 lb 14.6 oz)   LMP 2020   SpO2 98%   Breastfeeding No   BMI 36.48 kg/m²     Physical Exam       Psych: Altert and oriented to time, place and person  Mood and affect appropriate   General: well developed; well nourished  no acute " distress  Head: normocephalic  Heart: regular rate and rhythm, no murmur  Lungs:  breathing is unlabored  clear to auscultation bilaterally  Abdomen: Gravid - soft and non-tender  97% growth  2 wks ago 7#13  Contractions - occasional   + accels and variability  Lower Extremities: Neg edema, DTR's 2+  and Negative clonus  V/E:  Closed/ thick / high / very posterior   U/S confirmed cephalic          SSE for F/B - unable to insert cath through cx due to very posterior and thick      Prenatal Labs  Lab Results   Component Value Date    HGB 11.3 (L) 2021    HEPBSAG Negative 2020    ABSCRN Negative 2021    [id number] Non Reactive 2020    HEPCVIRUSABY <0.1 2020    STREPGPB Negative 2021       Current Labs Reviewed   CBC    Assessment:  1. IUP with an Estimated Date of Delivery: 21  2. LGA  3. Induction of labor because of gestational hypertension  4. Group B strep status: negative  5. FHT's reassuring     Plan:    1.  Admit to [facility]   cytotec induction & pitocin AM   2.  Amniotomy with descent of presenting part  3.  Anticipate    4.  Encouraged questions - none at this time.      Mel Cosme CNM  2021  17:44 EST

## 2021-02-04 NOTE — NON STRESS TEST
Tea Marie, a  at 38w1d with an LIBAN of 2021, by Ultrasound, was seen at [facility] for a nonstress test.    Chief Complaint   Patient presents with   • Scheduled Induction     Scheduled IOL for high blood pressure       Patient Active Problem List   Diagnosis   • Excessive fetal growth affecting management of mother in third trimester, antepartum   • Gestational HTN, third trimester       Start Time: 1630  Stop Time: 1700    Interpretation A  Nonstress Test Interpretation A: Reactive (21 1700 : Erna Coker RN)

## 2021-02-04 NOTE — PROGRESS NOTES
Prenatal Care Visit    Subjective   Chief Complaint   Patient presents with   • Routine Prenatal Visit     BP check       History:   Tea is a  currently at 38w1d who presents for a prenatal care visit today.    Mild hand swelling.  No headaches or vision changes.    Social History    Tobacco Use      Smoking status: Never Smoker      Smokeless tobacco: Never Used       Objective   /92   Wt 93.4 kg (206 lb)   LMP 2020   BMI 36.49 kg/m²   Physical Exam:  Normal, gestational age-appropriate exam today   SVE 0.5/25/-3       Plan   Medical Decision Making:    I have reviewed the prenatal labs and ultrasound(s) today. I have reviewed the most recent prenatal progress note(s).    Diagnosis: Supervision of high risk pregnancy   LGA  Gestational HTN   Tests/Orders/Rx today: No orders of the defined types were placed in this encounter.      Medication Management: none     Topics discussed: Prenatal care milestones  induction of labor  kick counts and fetal movement  labor signs and symptoms  PIH precautions   We discussed the diagnosis of gestational hypertension today.  Labs were reassuring yesterday.  Proceed with induction of labor this afternoon.  Risks for the procedure were reviewed.   Tests next visit: none   Next visit: none     Thong Culver MD  Obstetrics and Gynecology  [facility]

## 2021-02-04 NOTE — NURSING NOTE
Noah RAM attempted to place strong bulb for cervical ripening and was unable to do so @ this time. Patient tolerated well.

## 2021-02-05 PROCEDURE — 25010000002 MORPHINE SULFATE (PF) 2 MG/ML SOLUTION: Performed by: OBSTETRICS & GYNECOLOGY

## 2021-02-05 PROCEDURE — 25010000002 ROPIVACAINE PER 1 MG: Performed by: NURSE ANESTHETIST, CERTIFIED REGISTERED

## 2021-02-05 PROCEDURE — 25010000002 FENTANYL CITRATE (PF) 250 MCG/5ML SOLUTION 5 ML VIAL: Performed by: NURSE ANESTHETIST, CERTIFIED REGISTERED

## 2021-02-05 PROCEDURE — 59410 OBSTETRICAL CARE: CPT | Performed by: NURSE PRACTITIONER

## 2021-02-05 PROCEDURE — C1755 CATHETER, INTRASPINAL: HCPCS | Performed by: NURSE ANESTHETIST, CERTIFIED REGISTERED

## 2021-02-05 RX ORDER — HYDROCORTISONE 25 MG/G
1 CREAM TOPICAL AS NEEDED
Status: DISCONTINUED | OUTPATIENT
Start: 2021-02-05 | End: 2021-02-06 | Stop reason: HOSPADM

## 2021-02-05 RX ORDER — PROMETHAZINE HYDROCHLORIDE 12.5 MG/1
12.5 SUPPOSITORY RECTAL EVERY 6 HOURS PRN
Status: DISCONTINUED | OUTPATIENT
Start: 2021-02-05 | End: 2021-02-06 | Stop reason: HOSPADM

## 2021-02-05 RX ORDER — PROMETHAZINE HYDROCHLORIDE 25 MG/1
25 TABLET ORAL EVERY 6 HOURS PRN
Status: DISCONTINUED | OUTPATIENT
Start: 2021-02-05 | End: 2021-02-06 | Stop reason: HOSPADM

## 2021-02-05 RX ORDER — PRENATAL VIT/IRON FUM/FOLIC AC 27MG-0.8MG
1 TABLET ORAL DAILY
Status: DISCONTINUED | OUTPATIENT
Start: 2021-02-05 | End: 2021-02-05 | Stop reason: SDUPTHER

## 2021-02-05 RX ORDER — IBUPROFEN 600 MG/1
600 TABLET ORAL EVERY 6 HOURS PRN
Status: DISCONTINUED | OUTPATIENT
Start: 2021-02-05 | End: 2021-02-06 | Stop reason: HOSPADM

## 2021-02-05 RX ORDER — ONDANSETRON 4 MG/1
4 TABLET, FILM COATED ORAL EVERY 8 HOURS PRN
Status: DISCONTINUED | OUTPATIENT
Start: 2021-02-05 | End: 2021-02-06 | Stop reason: HOSPADM

## 2021-02-05 RX ORDER — PRENATAL VIT/IRON FUM/FOLIC AC 27MG-0.8MG
1 TABLET ORAL DAILY
Status: DISCONTINUED | OUTPATIENT
Start: 2021-02-05 | End: 2021-02-06 | Stop reason: HOSPADM

## 2021-02-05 RX ORDER — FERROUS SULFATE TAB EC 324 MG (65 MG FE EQUIVALENT) 324 (65 FE) MG
324 TABLET DELAYED RESPONSE ORAL
Status: DISCONTINUED | OUTPATIENT
Start: 2021-02-05 | End: 2021-02-06 | Stop reason: HOSPADM

## 2021-02-05 RX ORDER — HYDROCODONE BITARTRATE AND ACETAMINOPHEN 7.5; 325 MG/1; MG/1
1 TABLET ORAL EVERY 4 HOURS PRN
Status: DISCONTINUED | OUTPATIENT
Start: 2021-02-05 | End: 2021-02-06 | Stop reason: HOSPADM

## 2021-02-05 RX ORDER — DIPHENHYDRAMINE HCL 25 MG
25 CAPSULE ORAL NIGHTLY PRN
Status: DISCONTINUED | OUTPATIENT
Start: 2021-02-05 | End: 2021-02-06 | Stop reason: HOSPADM

## 2021-02-05 RX ORDER — SODIUM CHLORIDE 0.9 % (FLUSH) 0.9 %
1-10 SYRINGE (ML) INJECTION AS NEEDED
Status: DISCONTINUED | OUTPATIENT
Start: 2021-02-05 | End: 2021-02-06 | Stop reason: HOSPADM

## 2021-02-05 RX ORDER — BISACODYL 10 MG
10 SUPPOSITORY, RECTAL RECTAL DAILY PRN
Status: DISCONTINUED | OUTPATIENT
Start: 2021-02-06 | End: 2021-02-06 | Stop reason: HOSPADM

## 2021-02-05 RX ORDER — FENTANYL CITRATE 50 UG/ML
INJECTION, SOLUTION INTRAMUSCULAR; INTRAVENOUS
Status: DISPENSED
Start: 2021-02-05 | End: 2021-02-05

## 2021-02-05 RX ORDER — LANOLIN
CREAM (GRAM) TOPICAL
Status: DISCONTINUED | OUTPATIENT
Start: 2021-02-05 | End: 2021-02-06 | Stop reason: HOSPADM

## 2021-02-05 RX ORDER — DOCUSATE SODIUM 100 MG/1
100 CAPSULE, LIQUID FILLED ORAL 2 TIMES DAILY
Status: DISCONTINUED | OUTPATIENT
Start: 2021-02-05 | End: 2021-02-06 | Stop reason: HOSPADM

## 2021-02-05 RX ORDER — ONDANSETRON 2 MG/ML
4 INJECTION INTRAMUSCULAR; INTRAVENOUS EVERY 6 HOURS PRN
Status: DISCONTINUED | OUTPATIENT
Start: 2021-02-05 | End: 2021-02-06 | Stop reason: HOSPADM

## 2021-02-05 RX ORDER — HYDROCODONE BITARTRATE AND ACETAMINOPHEN 5; 325 MG/1; MG/1
1 TABLET ORAL EVERY 4 HOURS PRN
Status: DISCONTINUED | OUTPATIENT
Start: 2021-02-05 | End: 2021-02-06 | Stop reason: HOSPADM

## 2021-02-05 RX ADMIN — FERROUS SULFATE TAB EC 324 MG (65 MG FE EQUIVALENT) 324 MG: 324 (65 FE) TABLET DELAYED RESPONSE at 11:12

## 2021-02-05 RX ADMIN — BENZOCAINE AND LEVOMENTHOL 1 APPLICATION: 200; 5 SPRAY TOPICAL at 11:13

## 2021-02-05 RX ADMIN — IBUPROFEN 600 MG: 600 TABLET ORAL at 16:31

## 2021-02-05 RX ADMIN — ROPIVACAINE HYDROCHLORIDE 14 ML/HR: 2 INJECTION, SOLUTION EPIDURAL; INFILTRATION at 05:44

## 2021-02-05 RX ADMIN — MISOPROSTOL 25 MCG: 100 TABLET ORAL at 00:41

## 2021-02-05 RX ADMIN — DOCUSATE SODIUM 100 MG: 100 CAPSULE, LIQUID FILLED ORAL at 20:48

## 2021-02-05 RX ADMIN — HYDROCODONE BITARTRATE AND ACETAMINOPHEN 1 TABLET: 5; 325 TABLET ORAL at 17:49

## 2021-02-05 RX ADMIN — SODIUM CHLORIDE, POTASSIUM CHLORIDE, SODIUM LACTATE AND CALCIUM CHLORIDE 999 ML/HR: 600; 310; 30; 20 INJECTION, SOLUTION INTRAVENOUS at 05:09

## 2021-02-05 RX ADMIN — MORPHINE SULFATE 6 MG: 2 INJECTION, SOLUTION INTRAMUSCULAR; INTRAVENOUS at 03:52

## 2021-02-05 RX ADMIN — IBUPROFEN 600 MG: 600 TABLET ORAL at 11:12

## 2021-02-05 RX ADMIN — FERROUS SULFATE TAB EC 324 MG (65 MG FE EQUIVALENT) 324 MG: 324 (65 FE) TABLET DELAYED RESPONSE at 17:50

## 2021-02-05 RX ADMIN — PRENATAL VITAMINS-IRON FUMARATE 27 MG IRON-FOLIC ACID 0.8 MG TABLET 1 TABLET: at 11:12

## 2021-02-05 RX ADMIN — DOCUSATE SODIUM 100 MG: 100 CAPSULE, LIQUID FILLED ORAL at 11:12

## 2021-02-05 NOTE — L&D DELIVERY NOTE
[facility]  Vaginal Delivery Note    Delivery     Delivery: Vaginal, Spontaneous     YOB: 2023    Time of Birth:  Gestational Age 6:29 AM   38w2d     Anesthesia: Epidural     Delivering clinician: Mel Cosme CNM   Forceps?   No   Vacuum? No    Shoulder dystocia present: No        Delivery narrative:     Tea progressed to complete and pushed well -  of viable male over an intact perineum - infant bulb sx'd and to moms abdomen and dried - spontaneous cry and vigorous. Delayed cord clamping x 30-60 sec / double clamped and cut.  Spontaneous delivery of placenta - intact - 20 units pitocin to IVF's - F/F with message.  Vaginal inspection - intact perineum.  Rt upper labia - small laceration - no bleeding - no repair.    cc.       Infant    Findings: male  infant     Infant observations: Weight: 3799 g (8 lb 6 oz)   Length: 20.5  in  Observations/Comments:        Apgars: 8  @ 1 minute /    8  @ 5 minutes   Infant Name:      Placenta, Cord, and Fluid    Placenta delivered  Spontaneous  at   2021  6:42 AM     Cord: 3 vessels  present.   Nuchal Cord?  no   Cord blood obtained:  yes   Cord gases obtained:   no   Cord gas results: Venous:  No results found for: PHCVEN    Arterial:  No results found for: PHCART     Repair    Episiotomy: None        Lacerations:    Estimated Blood Loss:   see note above           Complications  none    Disposition  Mother to Mother Baby/Postpartum  in stable condition currently.  Baby to remains with mom  in stable condition currently.      Mel Cosme CNM  21  12:34 EST

## 2021-02-05 NOTE — PLAN OF CARE
Problem: Adult Inpatient Plan of Care  Goal: Patient-Specific Goal (Individualized)  2/5/2021 0444 by Elvi Caldwell, RN  Outcome: Ongoing, Progressing   Goal Outcome Evaluation:  Plan of Care Reviewed With: patient  Progress: no change  Outcome Summary: induction continues, cytocec vaginal causing contractions, baby tolerating labor at this point.  Medication with IV pain medication x 1

## 2021-02-05 NOTE — ANESTHESIA PROCEDURE NOTES
Labor Epidural      Patient location during procedure: OB  Indication:at surgeon's request  Performed By  CRNA: Bassem Palomino CRNA  Preanesthetic Checklist  Completed: patient identified, site marked, surgical consent, pre-op evaluation, timeout performed, IV checked, risks and benefits discussed and monitors and equipment checked  Additional Notes  Risks of epidural analgesia discussed , including but not limited to:  Headache, itching, nausea and vomiting, infection, failure, decreased BP, decreased fetal heart rate, possible . Permanent chronic back pain, nerve damage, paralysis, etc    Skin localized with lidocaine skin wheal.  Test dose 4 ml of 1.5% lidocaine with 1:200,000 Epinephrine - Negative    Bolus dose of 10 ml Fentanyl/Ropivacaine solution given and epidural infusion started.  Prep:  Pt Position:sitting  Sterile Tech:cap, gloves, mask and sterile barrier  Prep:chlorhexidine gluconate and isopropyl alcohol  Monitoring:blood pressure monitoring and continuous pulse oximetry  Epidural Block Procedure:  Approach:midline  Guidance:landmark technique and palpation technique  Location:L3-L4  Needle Type:Tuohy  Needle Gauge:18 G  Loss of Resistance Medium: saline  Loss of Resistance: 5cm  Cath Depth at skin:11 cm  Paresthesia: none  Aspiration:negative  Test Dose:negative  Number of Attempts: 1  Post Assessment:  Dressing:occlusive dressing applied and secured with tape  Pt Tolerance:patient tolerated the procedure well with no apparent complications  Complications:no

## 2021-02-05 NOTE — PLAN OF CARE
Goal Outcome Evaluation:     Progress: improving  Outcome Summary: VSS, Bleeding stable, Pain managed with medication

## 2021-02-05 NOTE — PLAN OF CARE
Goal Outcome Evaluation:  Plan of Care Reviewed With: patient  Progress: improving  Outcome Summary: V/S stable, bleeding wnl's, pain controlled with meds., bonding and caring for baby.

## 2021-02-05 NOTE — PLAN OF CARE
Goal Outcome Evaluation:  Plan of Care Reviewed With: patient  Progress: no change  Outcome Summary: cytotec vaginal induction started awaiting labor

## 2021-02-06 VITALS
OXYGEN SATURATION: 100 % | SYSTOLIC BLOOD PRESSURE: 124 MMHG | HEIGHT: 63 IN | HEART RATE: 97 BPM | TEMPERATURE: 97.9 F | BODY MASS INDEX: 36.48 KG/M2 | DIASTOLIC BLOOD PRESSURE: 76 MMHG | WEIGHT: 205.91 LBS | RESPIRATION RATE: 18 BRPM

## 2021-02-06 LAB
BASOPHILS # BLD AUTO: 0.06 10*3/MM3 (ref 0–0.2)
BASOPHILS NFR BLD AUTO: 0.5 % (ref 0–1.5)
DEPRECATED RDW RBC AUTO: 42.3 FL (ref 37–54)
EOSINOPHIL # BLD AUTO: 0.32 10*3/MM3 (ref 0–0.4)
EOSINOPHIL NFR BLD AUTO: 2.4 % (ref 0.3–6.2)
ERYTHROCYTE [DISTWIDTH] IN BLOOD BY AUTOMATED COUNT: 12.3 % (ref 12.3–15.4)
HCT VFR BLD AUTO: 31.4 % (ref 34–46.6)
HGB BLD-MCNC: 10.4 G/DL (ref 12–15.9)
IMM GRANULOCYTES # BLD AUTO: 0.08 10*3/MM3 (ref 0–0.05)
IMM GRANULOCYTES NFR BLD AUTO: 0.6 % (ref 0–0.5)
LYMPHOCYTES # BLD AUTO: 2.72 10*3/MM3 (ref 0.7–3.1)
LYMPHOCYTES NFR BLD AUTO: 20.4 % (ref 19.6–45.3)
MCH RBC QN AUTO: 31 PG (ref 26.6–33)
MCHC RBC AUTO-ENTMCNC: 33.1 G/DL (ref 31.5–35.7)
MCV RBC AUTO: 93.7 FL (ref 79–97)
MONOCYTES # BLD AUTO: 0.67 10*3/MM3 (ref 0.1–0.9)
MONOCYTES NFR BLD AUTO: 5 % (ref 5–12)
NEUTROPHILS NFR BLD AUTO: 71.1 % (ref 42.7–76)
NEUTROPHILS NFR BLD AUTO: 9.46 10*3/MM3 (ref 1.7–7)
NRBC BLD AUTO-RTO: 0 /100 WBC (ref 0–0.2)
PLATELET # BLD AUTO: 231 10*3/MM3 (ref 140–450)
PMV BLD AUTO: 11.3 FL (ref 6–12)
RBC # BLD AUTO: 3.35 10*6/MM3 (ref 3.77–5.28)
WBC # BLD AUTO: 13.31 10*3/MM3 (ref 3.4–10.8)

## 2021-02-06 PROCEDURE — 85025 COMPLETE CBC W/AUTO DIFF WBC: CPT | Performed by: NURSE PRACTITIONER

## 2021-02-06 RX ORDER — IBUPROFEN 800 MG/1
800 TABLET ORAL EVERY 8 HOURS PRN
Qty: 40 TABLET | Refills: 0 | Status: SHIPPED | OUTPATIENT
Start: 2021-02-06 | End: 2021-11-09

## 2021-02-06 RX ORDER — ACETAMINOPHEN 500 MG
1000 TABLET ORAL EVERY 8 HOURS
Qty: 60 TABLET | Refills: 0 | Status: SHIPPED | OUTPATIENT
Start: 2021-02-06 | End: 2021-12-05 | Stop reason: HOSPADM

## 2021-02-06 RX ORDER — DOCUSATE SODIUM 100 MG/1
100 CAPSULE, LIQUID FILLED ORAL 2 TIMES DAILY
Qty: 60 CAPSULE | Refills: 0 | Status: SHIPPED | OUTPATIENT
Start: 2021-02-06 | End: 2021-12-05 | Stop reason: HOSPADM

## 2021-02-06 RX ADMIN — HYDROCODONE BITARTRATE AND ACETAMINOPHEN 1 TABLET: 7.5; 325 TABLET ORAL at 06:16

## 2021-02-06 RX ADMIN — DOCUSATE SODIUM 100 MG: 100 CAPSULE, LIQUID FILLED ORAL at 10:43

## 2021-02-06 RX ADMIN — FERROUS SULFATE TAB EC 324 MG (65 MG FE EQUIVALENT) 324 MG: 324 (65 FE) TABLET DELAYED RESPONSE at 10:43

## 2021-02-06 RX ADMIN — PRENATAL VITAMINS-IRON FUMARATE 27 MG IRON-FOLIC ACID 0.8 MG TABLET 1 TABLET: at 10:43

## 2021-02-06 RX ADMIN — HYDROCODONE BITARTRATE AND ACETAMINOPHEN 1 TABLET: 7.5; 325 TABLET ORAL at 00:10

## 2021-02-06 NOTE — PLAN OF CARE
Goal Outcome Evaluation:        Outcome Summary: VSS. Adequate I&Os. Pain controlled with medication. Bonding with infant. Patient requests to go home 2/6/21

## 2021-02-07 NOTE — DISCHARGE SUMMARY
Discharge Summary  (late entry)     Tavo Marie  : 1990  MRN: [id number]  CSN: [id number]    Date of Admission: 2021   Date of Discharge:  2021   Delivering Physician: Mel Cosem        Admission Diagnosis: 1. Gestational HTN, third trimester [O13.3]   Discharge Diagnosis: 1. Same as above plus  2. Pregnancy at 38w2d - delivered       Procedures: 2021  - Vaginal, Spontaneous       Hospital Course  Patient is a 30 y.o.  who at 38w2d had a vaginal birth.    Her postpartum course was without complications.    On PPD #1 she requested to go home today.   She had normal lochia and pain well controlled with oral medications.    Baby is doing well.     VSS - AF  BP's normal  Psych: Altert and oriented to time, place and person  Mood and affect appropriate   General: well developed; well nourished  no acute distress  Lungs:  breathing is unlabored  Abdomen: fundus firm - below the umbilicus  Lower Extremities: no calf tenderness    Infant  male  fetus weighing 3799 g (8 lb 6 oz)   Apgars -  8 @ 1 minute /  8 @ 5 minutes.    Discharge labs  Lab Results   Component Value Date    WBC 13.31 (H) 2021    HGB 10.4 (L) 2021    HCT 31.4 (L) 2021     2021       Discharge Medications     Discharge Medications      New Medications      Instructions Start Date   acetaminophen 500 MG tablet  Commonly known as: TYLENOL   1,000 mg, Oral, Every 8 Hours, Alternate with ibuprofen      docusate sodium 100 MG capsule  Commonly known as: Colace   100 mg, Oral, 2 Times Daily      ibuprofen 800 MG tablet  Commonly known as: ADVIL,MOTRIN   800 mg, Oral, Every 8 Hours PRN         Continue These Medications      Instructions Start Date   ferrous sulfate 325 (65 FE) MG tablet   325 mg, Oral, 2 Times Daily Before Meals      PRENATAL GUMMY VITAMIN   1 each, Oral, Daily             Discharge Disposition Home or Self Care   Condition on Discharge: good   Follow-up: 6  weeks  At [facility] office      Mel Cosme CNM  2/7/2021

## 2021-02-19 NOTE — ANESTHESIA POSTPROCEDURE EVALUATION
Patient: Tea Marie    Procedure Summary     Date: 02/05/21 Room / Location:     Anesthesia Start: 0543 Anesthesia Stop: 02/19/21 1535    Procedure: LABOR ANALGESIA Diagnosis:     Scheduled Providers:  Provider: Bassem Palomino CRNA    Anesthesia Type: epidural ASA Status: 2 - Emergent          Anesthesia Type: epidural    Vitals  Vitals Value Taken Time   /76 02/06/21 1754   Temp 97.9 °F (36.6 °C) 02/06/21 1754   Pulse 97 02/06/21 1754   Resp 18 02/06/21 1754   SpO2 100 % 02/06/21 1754           Post Anesthesia Care and Evaluation    Patient location during evaluation: bedside  Patient participation: complete - patient participated  Level of consciousness: awake  Pain score: 0  Pain management: adequate  Airway patency: patent  Anesthetic complications: No anesthetic complications  PONV Status: controlled  Cardiovascular status: acceptable and stable  Respiratory status: acceptable and room air  Hydration status: acceptable

## 2021-03-19 ENCOUNTER — POSTPARTUM VISIT (OUTPATIENT)
Dept: OBSTETRICS AND GYNECOLOGY | Facility: CLINIC | Age: 31
End: 2021-03-19

## 2021-03-19 VITALS
DIASTOLIC BLOOD PRESSURE: 72 MMHG | WEIGHT: 176 LBS | BODY MASS INDEX: 31.18 KG/M2 | HEIGHT: 63 IN | SYSTOLIC BLOOD PRESSURE: 128 MMHG

## 2021-03-19 DIAGNOSIS — Z20.2 EXPOSURE TO STD: Primary | ICD-10-CM

## 2021-03-19 PROCEDURE — 0503F POSTPARTUM CARE VISIT: CPT | Performed by: NURSE PRACTITIONER

## 2021-03-19 NOTE — PROGRESS NOTES
Mel Cosme CNM  2021      Subjective   Chief Complaint   Patient presents with   • Postpartum Care     6 week post partum, 2021 AZAEL UNGERMelissa Ba delivered, bottle feeding, LMP 3-, Last pap 2020 WNL. Jameson states she is doing well, does not want birth control at this time     Tea Marie is a 30 y.o. year old  presenting to be seen for her postpartum visit.  She had a vaginal delivery.    Since delivery she has not been sexually active.  She does not have concerns about post-partum blues/depression.   She is bottle feeding.  For ongoing contraception, her plans are abstinence     Reports FOB left earlier in pregnancy.  She reports she did have a relationship with someone else since though they no longer are together.     The following portions of the patient's history were reviewed and updated as appropriate:current medications and allergies    Social History    Tobacco Use      Smoking status: Never Smoker      Smokeless tobacco: Never Used        Objective     Physical Exam  Constitutional   The patient is awake, well developed & well nourished.   Abdomen   The abdomen is soft and non tender.  No masses   Genitourinary Pelvis:      External genitalia:  normal appearance of the external genitalia   Vaginal:  normal pink mucosa without lesions.  Cervix:  Neg CMT  Uterus:  normal size, shape and consistency.  Adnexa:  normal bimanual exam of the adnexa  Musculoskeletal  Negative  Extremities  Full ROM   Psychiatric  The patient is oriented to person, place, and time. Speech is fluent and words are clear.          Assessment   Normal 6 week postpartum exam       Plan   Pap not due   SBE monthly   Kegels daily  nuswab discussed and agrees   BC options reviewed - may call if desires for birth control near future.     Meds prescribed - no  Follow up 1 year or prn    No orders of the defined types were placed in this encounter.         This note was electronically  signed.  Mel VERNON

## 2021-03-22 LAB
A VAGINAE DNA VAG QL NAA+PROBE: NORMAL SCORE
BVAB2 DNA VAG QL NAA+PROBE: NORMAL SCORE
C ALBICANS DNA VAG QL NAA+PROBE: NEGATIVE
C GLABRATA DNA VAG QL NAA+PROBE: NEGATIVE
C TRACH DNA VAG QL NAA+PROBE: NEGATIVE
MEGA1 DNA VAG QL NAA+PROBE: NORMAL SCORE
N GONORRHOEA DNA VAG QL NAA+PROBE: NEGATIVE
T VAGINALIS DNA VAG QL NAA+PROBE: NEGATIVE

## 2021-11-09 ENCOUNTER — HOSPITAL ENCOUNTER (EMERGENCY)
Facility: HOSPITAL | Age: 31
Discharge: HOME | End: 2021-11-09
Attending: EMERGENCY MEDICINE | Admitting: EMERGENCY MEDICINE

## 2021-11-09 ENCOUNTER — APPOINTMENT (OUTPATIENT)
Dept: ULTRASOUND IMAGING | Facility: HOSPITAL | Age: 31
End: 2021-11-09

## 2021-11-09 VITALS
BODY MASS INDEX: 27.31 KG/M2 | DIASTOLIC BLOOD PRESSURE: 66 MMHG | OXYGEN SATURATION: 100 % | HEART RATE: 117 BPM | TEMPERATURE: 98.3 F | SYSTOLIC BLOOD PRESSURE: 127 MMHG | WEIGHT: 160 LBS | HEIGHT: 64 IN | RESPIRATION RATE: 16 BRPM

## 2021-11-09 DIAGNOSIS — O46.90 VAGINAL BLEEDING IN PREGNANCY: Primary | ICD-10-CM

## 2021-11-09 DIAGNOSIS — U07.1 COVID-19: ICD-10-CM

## 2021-11-09 LAB
ALBUMIN SERPL-MCNC: 4.6 G/DL (ref 3.5–5.2)
ALBUMIN/GLOB SERPL: 1.5 G/DL
ALP SERPL-CCNC: 50 U/L (ref 39–117)
ALT SERPL W P-5'-P-CCNC: 12 U/L (ref 1–33)
ANION GAP SERPL CALCULATED.3IONS-SCNC: 12.1 MMOL/L (ref 5–15)
AST SERPL-CCNC: 17 U/L (ref 1–32)
B PARAPERT DNA SPEC QL NAA+PROBE: NOT DETECTED
B PERT DNA SPEC QL NAA+PROBE: NOT DETECTED
BASOPHILS # BLD AUTO: 0.07 10*3/MM3 (ref 0–0.2)
BASOPHILS NFR BLD AUTO: 0.8 % (ref 0–1.5)
BILIRUB SERPL-MCNC: 0.3 MG/DL (ref 0–1.2)
BILIRUB UR QL STRIP: NEGATIVE
BUN SERPL-MCNC: 6 MG/DL (ref 6–20)
BUN/CREAT SERPL: 10 (ref 7–25)
C PNEUM DNA NPH QL NAA+NON-PROBE: NOT DETECTED
CALCIUM SPEC-SCNC: 9.3 MG/DL (ref 8.6–10.5)
CHLORIDE SERPL-SCNC: 102 MMOL/L (ref 98–107)
CLARITY UR: CLEAR
CO2 SERPL-SCNC: 24.9 MMOL/L (ref 22–29)
COLOR UR: YELLOW
CREAT SERPL-MCNC: 0.6 MG/DL (ref 0.57–1)
DEPRECATED RDW RBC AUTO: 42.6 FL (ref 37–54)
EOSINOPHIL # BLD AUTO: 0.93 10*3/MM3 (ref 0–0.4)
EOSINOPHIL NFR BLD AUTO: 11 % (ref 0.3–6.2)
ERYTHROCYTE [DISTWIDTH] IN BLOOD BY AUTOMATED COUNT: 13 % (ref 12.3–15.4)
FLUAV SUBTYP SPEC NAA+PROBE: NOT DETECTED
FLUBV RNA ISLT QL NAA+PROBE: NOT DETECTED
GFR SERPL CREATININE-BSD FRML MDRD: 115 ML/MIN/1.73
GLOBULIN UR ELPH-MCNC: 3.1 GM/DL
GLUCOSE SERPL-MCNC: 93 MG/DL (ref 65–99)
GLUCOSE UR STRIP-MCNC: NEGATIVE MG/DL
HADV DNA SPEC NAA+PROBE: NOT DETECTED
HCG INTACT+B SERPL-ACNC: 351.8 MIU/ML
HCOV 229E RNA SPEC QL NAA+PROBE: NOT DETECTED
HCOV HKU1 RNA SPEC QL NAA+PROBE: NOT DETECTED
HCOV NL63 RNA SPEC QL NAA+PROBE: NOT DETECTED
HCOV OC43 RNA SPEC QL NAA+PROBE: NOT DETECTED
HCT VFR BLD AUTO: 36.1 % (ref 34–46.6)
HGB BLD-MCNC: 12 G/DL (ref 12–15.9)
HGB UR QL STRIP.AUTO: NEGATIVE
HMPV RNA NPH QL NAA+NON-PROBE: NOT DETECTED
HPIV1 RNA SPEC QL NAA+PROBE: NOT DETECTED
HPIV2 RNA SPEC QL NAA+PROBE: NOT DETECTED
HPIV3 RNA NPH QL NAA+PROBE: NOT DETECTED
HPIV4 P GENE NPH QL NAA+PROBE: NOT DETECTED
IMM GRANULOCYTES # BLD AUTO: 0.02 10*3/MM3 (ref 0–0.05)
IMM GRANULOCYTES NFR BLD AUTO: 0.2 % (ref 0–0.5)
KETONES UR QL STRIP: NEGATIVE
LEUKOCYTE ESTERASE UR QL STRIP.AUTO: NEGATIVE
LYMPHOCYTES # BLD AUTO: 1.59 10*3/MM3 (ref 0.7–3.1)
LYMPHOCYTES NFR BLD AUTO: 18.8 % (ref 19.6–45.3)
M PNEUMO IGG SER IA-ACNC: NOT DETECTED
MCH RBC QN AUTO: 29.7 PG (ref 26.6–33)
MCHC RBC AUTO-ENTMCNC: 33.2 G/DL (ref 31.5–35.7)
MCV RBC AUTO: 89.4 FL (ref 79–97)
MONOCYTES # BLD AUTO: 0.68 10*3/MM3 (ref 0.1–0.9)
MONOCYTES NFR BLD AUTO: 8 % (ref 5–12)
NEUTROPHILS NFR BLD AUTO: 5.16 10*3/MM3 (ref 1.7–7)
NEUTROPHILS NFR BLD AUTO: 61.2 % (ref 42.7–76)
NITRITE UR QL STRIP: NEGATIVE
NRBC BLD AUTO-RTO: 0 /100 WBC (ref 0–0.2)
PH UR STRIP.AUTO: 7 [PH] (ref 5–8)
PLATELET # BLD AUTO: 312 10*3/MM3 (ref 140–450)
PMV BLD AUTO: 9.2 FL (ref 6–12)
POTASSIUM SERPL-SCNC: 4.4 MMOL/L (ref 3.5–5.2)
PROT SERPL-MCNC: 7.7 G/DL (ref 6–8.5)
PROT UR QL STRIP: NEGATIVE
RBC # BLD AUTO: 4.04 10*6/MM3 (ref 3.77–5.28)
RHINOVIRUS RNA SPEC NAA+PROBE: NOT DETECTED
RSV RNA NPH QL NAA+NON-PROBE: NOT DETECTED
SARS-COV-2 RNA NPH QL NAA+NON-PROBE: DETECTED
SODIUM SERPL-SCNC: 139 MMOL/L (ref 136–145)
SP GR UR STRIP: 1.01 (ref 1–1.03)
UROBILINOGEN UR QL STRIP: NORMAL
WBC # BLD AUTO: 8.45 10*3/MM3 (ref 3.4–10.8)

## 2021-11-09 PROCEDURE — 76817 TRANSVAGINAL US OBSTETRIC: CPT

## 2021-11-09 PROCEDURE — 84702 CHORIONIC GONADOTROPIN TEST: CPT | Performed by: PHYSICIAN ASSISTANT

## 2021-11-09 PROCEDURE — 85025 COMPLETE CBC W/AUTO DIFF WBC: CPT | Performed by: PHYSICIAN ASSISTANT

## 2021-11-09 PROCEDURE — 0202U NFCT DS 22 TRGT SARS-COV-2: CPT | Performed by: PHYSICIAN ASSISTANT

## 2021-11-09 PROCEDURE — 80053 COMPREHEN METABOLIC PANEL: CPT | Performed by: PHYSICIAN ASSISTANT

## 2021-11-09 PROCEDURE — 36415 COLL VENOUS BLD VENIPUNCTURE: CPT

## 2021-11-09 PROCEDURE — 99283 EMERGENCY DEPT VISIT LOW MDM: CPT

## 2021-11-09 PROCEDURE — 81003 URINALYSIS AUTO W/O SCOPE: CPT | Performed by: PHYSICIAN ASSISTANT

## 2021-11-09 NOTE — ED PROVIDER NOTES
Subjective   Patient is a  female who is approximately 6 weeks pregnant presenting to the ER for evaluation of abdominal cramping and spotting.  Patient states that since last night she has had some mild spotting and lower abdominal cramping.  She states that she has not yet seen her OB/GYN with this pregnancy, follows with Dr. Leon.  She states that she called their office today and they told her to come in for an ultrasound.  She denies any recent fever, chills, dizziness, syncopal episodes, other abnormal vaginal discharge, trauma or any other injuries.  Denies any dysuria.  She does complain of some nasal congestion that started yesterday, believe she has a head cold and was asking what she is able to take for this given that she is pregnant.  Per chart review, patient does have an Rh+ blood type in our system.          Review of Systems   Constitutional: Negative for chills and fever.   HENT: Positive for congestion. Negative for ear pain, rhinorrhea and sore throat.    Eyes: Negative.    Respiratory: Negative.    Cardiovascular: Negative.    Gastrointestinal: Positive for abdominal pain. Negative for abdominal distention, diarrhea, nausea and vomiting.   Genitourinary: Positive for vaginal bleeding. Negative for dysuria and hematuria.   Musculoskeletal: Negative.    Skin: Negative.    Allergic/Immunologic: Negative for immunocompromised state.   Neurological: Negative.    Psychiatric/Behavioral: Negative.        Past Medical History:   Diagnosis Date   • Patient denies medical problems        Allergies   Allergen Reactions   • Ceclor [Cefaclor] Rash       Past Surgical History:   Procedure Laterality Date   • NO PAST SURGERIES     • TONSILLECTOMY AND ADENOIDECTOMY         History reviewed. No pertinent family history.    Social History     Socioeconomic History   • Marital status:    Tobacco Use   • Smoking status: Never Smoker   • Smokeless tobacco: Never Used   Substance and Sexual Activity   •  "Alcohol use: No   • Drug use: No   • Sexual activity: Yes     Partners: Male     Birth control/protection: None           Objective   Physical Exam  Vitals and nursing note reviewed.       /66 (BP Location: Left arm, Patient Position: Sitting)   Pulse 117   Temp 98.3 °F (36.8 °C) (Oral)   Resp 16   Ht 162.6 cm (64\")   Wt 72.6 kg (160 lb)   LMP 03/16/2021   SpO2 100%   BMI 27.46 kg/m²     GEN: No acute distress, sitting upright in stretcher, awake and alert.  She is answering questions appropriately.  Head: Normocephalic, atraumatic  Eyes: EOM intact  ENT: Mask in place per protocol  Cardiovascular: Tachycardia, regular rhythm  Lungs: Clear to auscultation bilaterally without adventitious sounds  Abdomen: Soft, nontender, nondistended, no peritoneal signs, no focal guarding or rigidity  Extremities: No edema, normal appearance, full range of motion  Neuro: GCS 15  Psych: Mood and affect are appropriate    Procedures           ED Course  ED Course as of 11/09/21 1919 Tue Nov 09, 2021   1149 Patient has an Rh+ blood type documented in our system. [LA]   1241 Color, UA: Yellow [LA]   1241 Appearance, UA: Clear [LA]   1241 pH, UA: 7.0 [LA]   1241 Specific Gravity, UA: 1.006 [LA]   1241 Glucose: Negative [LA]   1241 Ketones, UA: Negative [LA]   1241 Bilirubin, UA: Negative [LA]   1241 Blood, UA: Negative [LA]   1241 Protein, UA: Negative [LA]   1241 Leukocytes, UA: Negative [LA]   1241 Nitrite, UA: Negative [LA]   1241 WBC: 8.45 [LA]   1241 Hemoglobin: 12.0 [LA]   1317 HCG Quantitative: 351.80 [LA]   1317 Glucose: 93 [LA]   1317 BUN: 6 [LA]   1317 Creatinine: 0.60 [LA]   1317 Sodium: 139 [LA]   1317 Potassium: 4.4 [LA]   1317 Chloride: 102 [LA]   1317 CO2: 24.9 [LA]   1317 Calcium: 9.3 [LA]   1317 Total Protein: 7.7 [LA]   1317 Albumin: 4.60 [LA]   1317 ALT (SGPT): 12 [LA]   1317 AST (SGOT): 17 [LA]   1317 Alkaline Phosphatase: 50 [LA]   1317 Total Bilirubin: 0.3 [LA]   1317 eGFR Non  Am: 115 [LA] "   1317 Globulin: 3.1 [LA]   1317 A/G Ratio: 1.5 [LA]   1317 Anion Gap: 12.1 [LA]   1318 Narrative & Impression  PROCEDURE: US OB TRANSVAGINAL-     HISTORY: Approximate 6 weeks gestation, vaginal bleeding and abdominal  pain     TECHNIQUE: Sonographic images of the pelvis were obtained  transvaginally.     FINDINGS: There is no evidence of an intrauterine pregnancy. There is a  2.4 cm right ovarian cyst. The left ovary is normal. Both ovaries  demonstrate appropriate blood flow. There is no significant  free fluid  in the pelvis.     IMPRESSION:  Impression: No evidence of an intrauterine pregnancy therefore an  ectopic pregnancy is not excluded.     This report was finalized on 11/9/2021 1:10 PM by Lio Gonzales M.D..          Specimen Collected: 11/09/21 13:09         [LA]   1318 Lab states the patient is COVID positive. [LA]   1318 Discussed with Dr. Harvey, given patient's hCG levels, she is probably very early in pregnancy and will need a repeat ultrasound [LA]   1319 COVID19(!!): Detected [LA]   1333 Discussed all findings with patient and her .  Discussed they will both need to quarantine for at least 10 days from symptom onset.  Discussed with patient she is going to need to have repeat labs and ultrasounds.  Discussed very strict return precautions to the ER.  She verbalized understanding and was in agreement with this plan of care [LA]      ED Course User Index  [LA] Shwetah Rust PA-C                                           MDM  Number of Diagnoses or Management Options  COVID-19  Vaginal bleeding in pregnancy  Diagnosis management comments: On arrival, patient is stable.  She is normotensive, does have a mildly elevated heart rate.  Differential could include URI, allergic rhinitis, miscarriage, threatened miscarriage, ectopic pregnancy, dysfunctional uterine bleeding, polyp, or other concerns.  H&H is stable at this time.  Patient's hCG level was only minimally elevated at 351.8.   Ultrasound revealed no signs of an obvious intrauterine pregnancy, recommended follow-up, cannot exclude ectopic.  Discussed with Dr. Harvey, patient's hCG level is so low that she will does need to have continued follow-up if she has no focal guarding or tenderness on a certain side.  Her urine had no signs of infection.    Vital signs not suggestive for life-threatening hemorrhage. Pelvic exam deferred to gynecologist after discussion with patient.  Recommend follow-up with her gynecologist at the first available appointment, discussed she will need repeat lab work and ultrasounds.. She did test positive for COVID-19.  Her vital signs were stable otherwise.  We discussed follow-up, quarantine, strict return precautions.  She verbalized understanding and was in agreement with this plan of care.       Amount and/or Complexity of Data Reviewed  Clinical lab tests: reviewed and ordered  Tests in the radiology section of CPT®: reviewed and ordered  Discussion of test results with the performing providers: yes  Review and summarize past medical records: yes  Discuss the patient with other providers: yes    Risk of Complications, Morbidity, and/or Mortality  Presenting problems: moderate  Diagnostic procedures: moderate  Management options: low    Patient Progress  Patient progress: stable      Final diagnoses:   Vaginal bleeding in pregnancy   COVID-19       ED Disposition  ED Disposition     ED Disposition Condition Comment    Discharge Stable           Dasha Fernando, YO  [id number] [place] [address] [phone]    Schedule an appointment as soon as possible for a visit       OBGYN               Medication List      No changes were made to your prescriptions during this visit.          Shwetha Rust PA-C  11/09/21 3849

## 2021-11-09 NOTE — DISCHARGE INSTRUCTIONS
Your pregnancy hormone levels were low today which could indicate that you are very early in pregnancy and this may be why we are not seeing any signs of pregnancy on ultrasound.  You will need to have very close follow-up with repeat ultrasounds and repeat hCG levels to ensure pregnancy is progressing and pregnancy is in the uterus where it is supposed to be.  You did test positive for COVID-19 as well.  Continue over-the-counter medications such as Tylenol, saline nasal sprays, Mucinex and Benadryl as needed.  Need to quarantine for at least 10 days from symptom onset.  Return to the ER for any change, worsening of symptoms, or any additional concerns including but not limited to severe or worsening bleeding, severe abdominal pain, fever greater than 100.4, dizziness, syncope, shortness of breath, chest pain.

## 2021-11-10 ENCOUNTER — TELEPHONE (OUTPATIENT)
Dept: OBSTETRICS AND GYNECOLOGY | Facility: CLINIC | Age: 31
End: 2021-11-10

## 2021-11-10 DIAGNOSIS — O36.80X0 PREGNANCY WITH UNCERTAIN FETAL VIABILITY, SINGLE OR UNSPECIFIED FETUS: Primary | ICD-10-CM

## 2021-11-15 ENCOUNTER — LAB (OUTPATIENT)
Dept: LAB | Facility: HOSPITAL | Age: 31
End: 2021-11-15

## 2021-11-15 DIAGNOSIS — O36.80X0 PREGNANCY WITH UNCERTAIN FETAL VIABILITY, SINGLE OR UNSPECIFIED FETUS: ICD-10-CM

## 2021-11-15 LAB — HCG INTACT+B SERPL-ACNC: 1809 MIU/ML

## 2021-11-15 PROCEDURE — 84702 CHORIONIC GONADOTROPIN TEST: CPT

## 2021-11-20 ENCOUNTER — HOSPITAL ENCOUNTER (EMERGENCY)
Facility: HOSPITAL | Age: 31
Discharge: HOME | End: 2021-11-21
Attending: EMERGENCY MEDICINE | Admitting: EMERGENCY MEDICINE

## 2021-11-20 ENCOUNTER — APPOINTMENT (OUTPATIENT)
Dept: ULTRASOUND IMAGING | Facility: HOSPITAL | Age: 31
End: 2021-11-20

## 2021-11-20 DIAGNOSIS — O46.90 VAGINAL BLEEDING IN PREGNANCY: Primary | ICD-10-CM

## 2021-11-20 LAB
ALBUMIN SERPL-MCNC: 4.6 G/DL (ref 3.5–5.2)
ALBUMIN/GLOB SERPL: 1.5 G/DL
ALP SERPL-CCNC: 46 U/L (ref 39–117)
ALT SERPL W P-5'-P-CCNC: 14 U/L (ref 1–33)
ANION GAP SERPL CALCULATED.3IONS-SCNC: 12.1 MMOL/L (ref 5–15)
AST SERPL-CCNC: 15 U/L (ref 1–32)
BACTERIA UR QL AUTO: ABNORMAL /HPF
BASOPHILS # BLD AUTO: 0.06 10*3/MM3 (ref 0–0.2)
BASOPHILS NFR BLD AUTO: 0.7 % (ref 0–1.5)
BILIRUB SERPL-MCNC: <0.2 MG/DL (ref 0–1.2)
BILIRUB UR QL STRIP: NEGATIVE
BUN SERPL-MCNC: 10 MG/DL (ref 6–20)
BUN/CREAT SERPL: 16.9 (ref 7–25)
CALCIUM SPEC-SCNC: 9.2 MG/DL (ref 8.6–10.5)
CHLORIDE SERPL-SCNC: 102 MMOL/L (ref 98–107)
CLARITY UR: CLEAR
CO2 SERPL-SCNC: 24.9 MMOL/L (ref 22–29)
COLOR UR: YELLOW
CREAT SERPL-MCNC: 0.59 MG/DL (ref 0.57–1)
DEPRECATED RDW RBC AUTO: 41.4 FL (ref 37–54)
EOSINOPHIL # BLD AUTO: 1.06 10*3/MM3 (ref 0–0.4)
EOSINOPHIL NFR BLD AUTO: 12 % (ref 0.3–6.2)
ERYTHROCYTE [DISTWIDTH] IN BLOOD BY AUTOMATED COUNT: 12.5 % (ref 12.3–15.4)
GFR SERPL CREATININE-BSD FRML MDRD: 117 ML/MIN/1.73
GLOBULIN UR ELPH-MCNC: 3.1 GM/DL
GLUCOSE SERPL-MCNC: 95 MG/DL (ref 65–99)
GLUCOSE UR STRIP-MCNC: NEGATIVE MG/DL
HCG INTACT+B SERPL-ACNC: 3997 MIU/ML
HCT VFR BLD AUTO: 33.5 % (ref 34–46.6)
HGB BLD-MCNC: 11.1 G/DL (ref 12–15.9)
HGB UR QL STRIP.AUTO: ABNORMAL
HYALINE CASTS UR QL AUTO: ABNORMAL /LPF
IMM GRANULOCYTES # BLD AUTO: 0.02 10*3/MM3 (ref 0–0.05)
IMM GRANULOCYTES NFR BLD AUTO: 0.2 % (ref 0–0.5)
KETONES UR QL STRIP: NEGATIVE
LEUKOCYTE ESTERASE UR QL STRIP.AUTO: NEGATIVE
LYMPHOCYTES # BLD AUTO: 2.85 10*3/MM3 (ref 0.7–3.1)
LYMPHOCYTES NFR BLD AUTO: 32.2 % (ref 19.6–45.3)
MCH RBC QN AUTO: 30 PG (ref 26.6–33)
MCHC RBC AUTO-ENTMCNC: 33.1 G/DL (ref 31.5–35.7)
MCV RBC AUTO: 90.5 FL (ref 79–97)
MONOCYTES # BLD AUTO: 0.55 10*3/MM3 (ref 0.1–0.9)
MONOCYTES NFR BLD AUTO: 6.2 % (ref 5–12)
NEUTROPHILS NFR BLD AUTO: 4.3 10*3/MM3 (ref 1.7–7)
NEUTROPHILS NFR BLD AUTO: 48.7 % (ref 42.7–76)
NITRITE UR QL STRIP: NEGATIVE
NRBC BLD AUTO-RTO: 0 /100 WBC (ref 0–0.2)
PH UR STRIP.AUTO: 8 [PH] (ref 5–8)
PLATELET # BLD AUTO: 333 10*3/MM3 (ref 140–450)
PMV BLD AUTO: 9.1 FL (ref 6–12)
POTASSIUM SERPL-SCNC: 3.9 MMOL/L (ref 3.5–5.2)
PROT SERPL-MCNC: 7.7 G/DL (ref 6–8.5)
PROT UR QL STRIP: NEGATIVE
RBC # BLD AUTO: 3.7 10*6/MM3 (ref 3.77–5.28)
RBC # UR STRIP: ABNORMAL /HPF
REF LAB TEST METHOD: ABNORMAL
SODIUM SERPL-SCNC: 139 MMOL/L (ref 136–145)
SP GR UR STRIP: 1.01 (ref 1–1.03)
SQUAMOUS #/AREA URNS HPF: ABNORMAL /HPF
UROBILINOGEN UR QL STRIP: ABNORMAL
WBC # UR STRIP: ABNORMAL /HPF
WBC NRBC COR # BLD: 8.84 10*3/MM3 (ref 3.4–10.8)

## 2021-11-20 PROCEDURE — 36415 COLL VENOUS BLD VENIPUNCTURE: CPT

## 2021-11-20 PROCEDURE — 85025 COMPLETE CBC W/AUTO DIFF WBC: CPT | Performed by: EMERGENCY MEDICINE

## 2021-11-20 PROCEDURE — 76817 TRANSVAGINAL US OBSTETRIC: CPT

## 2021-11-20 PROCEDURE — 99283 EMERGENCY DEPT VISIT LOW MDM: CPT

## 2021-11-20 PROCEDURE — 84702 CHORIONIC GONADOTROPIN TEST: CPT | Performed by: EMERGENCY MEDICINE

## 2021-11-20 PROCEDURE — 80053 COMPREHEN METABOLIC PANEL: CPT | Performed by: EMERGENCY MEDICINE

## 2021-11-20 PROCEDURE — 84144 ASSAY OF PROGESTERONE: CPT | Performed by: EMERGENCY MEDICINE

## 2021-11-20 PROCEDURE — 81001 URINALYSIS AUTO W/SCOPE: CPT | Performed by: EMERGENCY MEDICINE

## 2021-11-21 VITALS
WEIGHT: 160 LBS | SYSTOLIC BLOOD PRESSURE: 130 MMHG | BODY MASS INDEX: 27.31 KG/M2 | OXYGEN SATURATION: 100 % | TEMPERATURE: 98.1 F | RESPIRATION RATE: 18 BRPM | DIASTOLIC BLOOD PRESSURE: 79 MMHG | HEART RATE: 91 BPM | HEIGHT: 64 IN

## 2021-11-21 DIAGNOSIS — O36.80X0 PREGNANCY WITH UNCERTAIN FETAL VIABILITY, SINGLE OR UNSPECIFIED FETUS: Primary | ICD-10-CM

## 2021-11-21 DIAGNOSIS — O36.80X0 PREGNANCY, LOCATION UNKNOWN: ICD-10-CM

## 2021-11-21 LAB — PROGEST SERPL-MCNC: 6.06 NG/ML

## 2021-11-21 NOTE — DISCHARGE INSTRUCTIONS
As we discussed this evening in the emergency department your findings today could represent 1 of 2 things.  It could simply be too early to see a pregnancy in your uterus, however it is very possible that the findings we saw tonight could represent something called an ectopic pregnancy.  This is where the pregnancy implants somewhere inappropriate which would be anywhere other than your uterus.  This is not clear based on the ultrasound that we obtained this evening.  I have talked to the OB doctor, Dr. Culver and his office will be reaching out to you on Monday for a follow-up appointment in the next few days.  They will recheck your hormone levels and repeat an ultrasound to see if anything has changed.  However, if you feel worse, develop abdominal pain, have heavier bleeding I would recommend that you return to the emergency department immediately instead of waiting for your OB appointment.

## 2021-11-21 NOTE — ED NOTES
Dr. Culver called per Dr. Saenz with answer. Call transferred.      Florian Olmstead  11/20/21 4587

## 2021-11-21 NOTE — ED PROVIDER NOTES
TRIAGE CHIEF COMPLAINT:     Nursing and triage notes reviewed    Chief Complaint   Patient presents with   • Vaginal Bleeding      HPI: Tea Nelson is a 31 y.o. female who presents to the emergency department complaining of vaginal bleeding.  Patient has had spotting and bleeding since the ninth where she was seen in the emergency department.  At the time ultrasound did not reveal any obvious intrauterine gestation and patient had a hormone level in the 300s.  Repeat 6 days later was around 1800.  Patient states she has continued to have some intermittent bleeding describing primarily a reddish-brown color.  This evening had some that was bright red after she urinated.  She denies having any cramping.  Denies abdominal pain.  She denies pain or burning with urination.  She does state that she had had sex with her  shortly prior to the onset of bleeding this evening.    REVIEW OF SYSTEMS: All other systems reviewed and are negative     PAST MEDICAL HISTORY:   Past Medical History:   Diagnosis Date   • Patient denies medical problems         FAMILY HISTORY:   History reviewed. No pertinent family history.     SOCIAL HISTORY:   Social History     Socioeconomic History   • Marital status:    Tobacco Use   • Smoking status: Never Smoker   • Smokeless tobacco: Never Used   Substance and Sexual Activity   • Alcohol use: No   • Drug use: No   • Sexual activity: Yes     Partners: Male     Birth control/protection: None        SURGICAL HISTORY:   Past Surgical History:   Procedure Laterality Date   • NO PAST SURGERIES     • TONSILLECTOMY AND ADENOIDECTOMY          CURRENT MEDICATIONS:      Medication List      ASK your doctor about these medications    acetaminophen 500 MG tablet  Commonly known as: TYLENOL  Take 2 tablets by mouth Every 8 (Eight) Hours. Alternate with ibuprofen     docusate sodium 100 MG capsule  Commonly known as: Colace  Take 1 capsule by mouth 2 (Two) Times a Day.     ferrous  sulfate 325 (65 FE) MG tablet  Take 1 tablet by mouth 2 (Two) Times a Day Before Meals.     PRENATAL GUMMY VITAMIN             ALLERGIES: Ceclor [cefaclor]     PHYSICAL EXAM:   VITAL SIGNS:   Vitals:    11/20/21 2031   BP: 131/73   Pulse: 97   Resp: 20   Temp: 98.7 °F (37.1 °C)   SpO2: 99%      CONSTITUTIONAL: Awake, oriented, appears nontoxic   HENT: Atraumatic, normocephalic, oral mucosa pink and moist, airway patent. Nares patent without drainage. External ears normal.   EYES: Conjunctivae clear  NECK: Trachea midline   CARDIOVASCULAR: Normal heart rate, Normal rhythm, No murmurs, rubs, gallops   PULMONARY/CHEST: Clear to auscultation, no rhonchi, wheezes, or rales. Symmetrical breath sounds.  ABDOMINAL: Nondistended, soft, nontender - no rebound or guarding.  Pelvic exam deferred.  NEUROLOGIC: Nonfocal, moving all four extremities, no gross sensory or motor deficits.   EXTREMITIES: No clubbing, cyanosis, or edema   SKIN: Warm, Dry, No erythema, No rash     ED COURSE / MEDICAL DECISION MAKING:   Tea Nelson is a 31 y.o. female who presents to the emergency department for evaluation of vaginal bleeding.  Patient is nondistressed on arrival in the emergency department.  Vital signs are stable on arrival.  Exam is largely unremarkable.  Pelvic exam was deferred.    We will obtain repeat hormone level, ultrasound, labs for evaluation.    Patient's hemoglobin has dropped slightly from a previous value down to 11.1 from 12.  Urinalysis shows no obvious evidence of infection at this time.  Patient's beta hCG has increased to 3997.    Ultrasound per radiology interpretation reveals a possible corpus luteum versus ectopic pregnancy of the right adnexa.  I consulted the OB/GYN physician on-call to discuss the case.  Given the ambiguity of the ultrasound it is still uncertain whether or not this represents an ectopic pregnancy.  We are however suspicious that this is very possibly an ectopic pregnancy.  Given  that patient has no abdominal pain at this time OB/GYN physician recommended allowing her to return home and they would see her at the beginning of the week and follow-up for repeat hormone and ultrasound checking.    Patient was comfortable with this plan but I did make it very clear to her that should she develop abdominal pain at any point, increased bleeding, or any other new symptom that she needs to return to the emergency department immediately and not wait for an OB/GYN follow-up appointment.  She expressed understanding and was discharged in good condition.    DECISION TO DISCHARGE/ADMIT: see ED care timeline     FINAL IMPRESSION:   1 --vaginal bleeding during pregnancy  2 --   3 --     Electronically signed by: Belia Saenz MD, 11/20/2021 21:16 Belia Mckeon MD  11/21/21 0032

## 2021-11-22 ENCOUNTER — LAB (OUTPATIENT)
Dept: LAB | Facility: HOSPITAL | Age: 31
End: 2021-11-22

## 2021-11-22 ENCOUNTER — INITIAL PRENATAL (OUTPATIENT)
Dept: OBSTETRICS AND GYNECOLOGY | Facility: CLINIC | Age: 31
End: 2021-11-22

## 2021-11-22 VITALS — BODY MASS INDEX: 30.21 KG/M2 | WEIGHT: 176 LBS | SYSTOLIC BLOOD PRESSURE: 132 MMHG | DIASTOLIC BLOOD PRESSURE: 80 MMHG

## 2021-11-22 DIAGNOSIS — N94.89 ADNEXAL MASS: ICD-10-CM

## 2021-11-22 DIAGNOSIS — O36.80X0 PREGNANCY WITH UNCERTAIN FETAL VIABILITY, SINGLE OR UNSPECIFIED FETUS: ICD-10-CM

## 2021-11-22 DIAGNOSIS — O36.80X0 PREGNANCY, LOCATION UNKNOWN: ICD-10-CM

## 2021-11-22 DIAGNOSIS — O36.80X0 PREGNANCY OF UNKNOWN ANATOMIC LOCATION: Primary | ICD-10-CM

## 2021-11-22 DIAGNOSIS — O20.9 VAGINAL BLEEDING AFFECTING EARLY PREGNANCY: ICD-10-CM

## 2021-11-22 LAB
ALBUMIN SERPL-MCNC: 4.7 G/DL (ref 3.5–5.2)
ALBUMIN/GLOB SERPL: 1.4 G/DL
ALP SERPL-CCNC: 46 U/L (ref 39–117)
ALT SERPL W P-5'-P-CCNC: 14 U/L (ref 1–33)
ANION GAP SERPL CALCULATED.3IONS-SCNC: 8.7 MMOL/L (ref 5–15)
AST SERPL-CCNC: 15 U/L (ref 1–32)
BILIRUB SERPL-MCNC: 0.2 MG/DL (ref 0–1.2)
BUN SERPL-MCNC: 7 MG/DL (ref 6–20)
BUN/CREAT SERPL: 12.7 (ref 7–25)
CALCIUM SPEC-SCNC: 9.2 MG/DL (ref 8.6–10.5)
CHLORIDE SERPL-SCNC: 101 MMOL/L (ref 98–107)
CO2 SERPL-SCNC: 26.3 MMOL/L (ref 22–29)
CREAT SERPL-MCNC: 0.55 MG/DL (ref 0.57–1)
DEPRECATED RDW RBC AUTO: 42.1 FL (ref 37–54)
ERYTHROCYTE [DISTWIDTH] IN BLOOD BY AUTOMATED COUNT: 12.7 % (ref 12.3–15.4)
GFR SERPL CREATININE-BSD FRML MDRD: 127 ML/MIN/1.73
GLOBULIN UR ELPH-MCNC: 3.3 GM/DL
GLUCOSE SERPL-MCNC: 91 MG/DL (ref 65–99)
HCG INTACT+B SERPL-ACNC: 5167 MIU/ML
HCT VFR BLD AUTO: 35.8 % (ref 34–46.6)
HGB BLD-MCNC: 11.6 G/DL (ref 12–15.9)
MCH RBC QN AUTO: 29.6 PG (ref 26.6–33)
MCHC RBC AUTO-ENTMCNC: 32.4 G/DL (ref 31.5–35.7)
MCV RBC AUTO: 91.3 FL (ref 79–97)
PLATELET # BLD AUTO: 365 10*3/MM3 (ref 140–450)
PMV BLD AUTO: 9.4 FL (ref 6–12)
POTASSIUM SERPL-SCNC: 4.4 MMOL/L (ref 3.5–5.2)
PROT SERPL-MCNC: 8 G/DL (ref 6–8.5)
RBC # BLD AUTO: 3.92 10*6/MM3 (ref 3.77–5.28)
SODIUM SERPL-SCNC: 136 MMOL/L (ref 136–145)
WBC NRBC COR # BLD: 9.4 10*3/MM3 (ref 3.4–10.8)

## 2021-11-22 PROCEDURE — 84702 CHORIONIC GONADOTROPIN TEST: CPT

## 2021-11-22 PROCEDURE — 80053 COMPREHEN METABOLIC PANEL: CPT | Performed by: OBSTETRICS & GYNECOLOGY

## 2021-11-22 PROCEDURE — 36415 COLL VENOUS BLD VENIPUNCTURE: CPT | Performed by: OBSTETRICS & GYNECOLOGY

## 2021-11-22 PROCEDURE — 99214 OFFICE O/P EST MOD 30 MIN: CPT | Performed by: OBSTETRICS & GYNECOLOGY

## 2021-11-22 PROCEDURE — 85027 COMPLETE CBC AUTOMATED: CPT | Performed by: OBSTETRICS & GYNECOLOGY

## 2021-11-22 NOTE — PROGRESS NOTES
Chief Complaint  Possible Pregnancy (ER follow up, patient complains of vaginal bleeding. )     History of Present Illness:  Patient is 31 y.o.  who presents to [facility] OB [facility] for follow-up evaluation of vaginal bleeding and pregnancy.  Patient reports her last menstrual cycle was .  Patient has had multiple home pregnancy tests which were positive.  Patient has been seen in the emergency room twice for vaginal bleeding.  Patient was initially seen on .  Patient did have sinus congestion at that time.  Patient did have a positive Covid test as well.  Patient had an ultrasound as well as labs as noted.  Patient called the office and did return for any hCG level on November 15.  Patient reports she continued to have intermittent episodes of vaginal spotting.  Patient reports the spotting was initially dark in discoloration.  Patient reports that most recently it became bright red.  Patient reports it has only been spotting passing small clots.  Patient denies any abdominal or pelvic pain.  Patient presented to the emergency room on .  Patient did have repeat imaging as well as labs as noted.  Patient is here today for follow-up evaluation.  Patient again continues to report no abdominal or pelvic pain.    History  Past Medical History:   Diagnosis Date   • Patient denies medical problems      Current Outpatient Medications on File Prior to Visit   Medication Sig Dispense Refill   • acetaminophen (TYLENOL) 500 MG tablet Take 2 tablets by mouth Every 8 (Eight) Hours. Alternate with ibuprofen 60 tablet 0   • docusate sodium (Colace) 100 MG capsule Take 1 capsule by mouth 2 (Two) Times a Day. 60 capsule 0   • ferrous sulfate 325 (65 FE) MG tablet Take 1 tablet by mouth 2 (Two) Times a Day Before Meals. 60 tablet 6   • PRENATAL GUMMY VITAMIN Chew 1 each Daily.       No current facility-administered medications on file prior to visit.     Allergies   Allergen  Reactions   • Ceclor [Cefaclor] Rash     Past Surgical History:   Procedure Laterality Date   • NO PAST SURGERIES     • TONSILLECTOMY AND ADENOIDECTOMY       History reviewed. No pertinent family history.  Social History     Socioeconomic History   • Marital status:    Tobacco Use   • Smoking status: Never Smoker   • Smokeless tobacco: Never Used   Vaping Use   • Vaping Use: Never used   Substance and Sexual Activity   • Alcohol use: No   • Drug use: No   • Sexual activity: Yes     Partners: Male     Birth control/protection: None       Physical Examination:  Vital Signs: /80   Wt 79.8 kg (176 lb)   BMI 30.21 kg/m²     General Appearance: alert, appears stated age, and cooperative  Breasts: Not performed.  Abdomen: no masses, no hepatomegaly, no splenomegaly, soft non-tender, no guarding and no rebound tenderness  Pelvic: Not performed.    Data Review:  The following data was reviewed by: Deloris Leon MD on 11/22/2021:     Labs:  Respiratory Panel PCR w/COVID-19(SARS-CoV-2) DEBO/LILIAN/HAFSA/PAD/COR/MAD/VINNIE In-House, NP Swab in UTM/VTM, 3-4 HR TAT - Swab, Nasopharynx (11/09/2021 11:57)  Urinalysis With Culture If Indicated - Urine, Clean Catch (11/09/2021 12:05)  CBC & Differential (11/09/2021 12:18)  Comprehensive Metabolic Panel (11/09/2021 12:18)  hCG, Quantitative, Pregnancy (11/09/2021 12:18)  hCG, Quantitative, Pregnancy (11/15/2021 14:38)  Progesterone (11/20/2021 21:20)  Urinalysis With Culture If Indicated - Urine, Clean Catch (11/20/2021 21:23)  Urinalysis, Microscopic Only - Urine, Clean Catch (11/20/2021 21:23)  CBC & Differential (11/20/2021 21:29)  Comprehensive Metabolic Panel (11/20/2021 21:29)  hCG, Quantitative, Pregnancy (11/20/2021 21:29)  hCG, Quantitative, Pregnancy (11/21/2021 21:23)    Imaging:  US Ob Transvaginal (11/09/2021 13:06) -images reviewed and agree with interpretation  US Ob Transvaginal (11/20/2021 22:34) -images reviewed and agree with interpretation  US Ob Transvaginal  (11/22/2021 13:23)    Medical Records:  None    Assessment and Plan   Problem List Items Addressed This Visit     None      Visit Diagnoses     Pregnancy of unknown anatomic location    -  Primary  Patient with pregnancy of unknown anatomic location concerning for right ectopic gestation.  Repeat imaging was obtained today as noted.  The patient and her significant other has been informed regarding those findings.  Patient denies any abdominal or pelvic pain.  We will repeat hCG level today as noted.  Will obtain additional labs as discussed.  Patient is to call for results in the a.m.  Patient has been given ectopic precautions and instructions.  Patient will follow up Wednesday with repeat imaging.  I have discussed with the patient the possibility of methotrexate pending her repeat labs as well as follow-up ultrasound.  I discussed with the patient also the risk of ectopic gestation with rupture of ectopic and potential sequela.  Patient is instructed in pelvic rest.  Patient is instructed in the need for close evaluation and follow-up.  Patient and her significant other voices understanding.    Relevant Orders    CBC (No Diff)    Comprehensive Metabolic Panel    hCG, Quantitative, Pregnancy    US Ob Transvaginal    Adnexal mass      Patient with adnexal masses noted.  Patient has been informed regarding those findings.  Patient and her significant other are informed that this is concerning for possible ectopic gestation.  Instructions and precautions were given.  Labs today as noted.  Patient to call for the results in a.m.  Patient is to follow-up as discussed.  Patient is to go to the emergency room if abdominal or pelvic pain, increasing vaginal bleeding, if patient becomes dizzy or lightheaded.    Relevant Orders    US Ob Transvaginal    Vaginal bleeding affecting early pregnancy      Patient with vaginal spotting as noted.  Patient's blood type is O+.  Repeat labs today as noted.  Threatened AB and ectopic  precautions have been given.          Follow Up/Instructions:  Follow up as noted.  Patient was given instructions and counseling regarding her condition or for health maintenance advice. Please see specific information pulled into the AVS if appropriate.     Note: Speech recognition transcription software may have been used to dictate portions of this document.  An attempt at proofreading has been made though minor errors in transcription may still be present.    This note was electronically signed.  Deloris Leon M.D.

## 2021-11-24 ENCOUNTER — ROUTINE PRENATAL (OUTPATIENT)
Dept: OBSTETRICS AND GYNECOLOGY | Facility: CLINIC | Age: 31
End: 2021-11-24

## 2021-11-24 ENCOUNTER — LAB (OUTPATIENT)
Dept: LAB | Facility: HOSPITAL | Age: 31
End: 2021-11-24

## 2021-11-24 VITALS — DIASTOLIC BLOOD PRESSURE: 74 MMHG | WEIGHT: 176 LBS | SYSTOLIC BLOOD PRESSURE: 130 MMHG | BODY MASS INDEX: 30.21 KG/M2

## 2021-11-24 DIAGNOSIS — O36.80X0 PREGNANCY OF UNKNOWN ANATOMIC LOCATION: Primary | ICD-10-CM

## 2021-11-24 DIAGNOSIS — O20.9 VAGINAL BLEEDING AFFECTING EARLY PREGNANCY: ICD-10-CM

## 2021-11-24 DIAGNOSIS — N94.89 ADNEXAL MASS: ICD-10-CM

## 2021-11-24 LAB — HCG INTACT+B SERPL-ACNC: 6198 MIU/ML

## 2021-11-24 PROCEDURE — 36415 COLL VENOUS BLD VENIPUNCTURE: CPT | Performed by: OBSTETRICS & GYNECOLOGY

## 2021-11-24 PROCEDURE — 99214 OFFICE O/P EST MOD 30 MIN: CPT | Performed by: OBSTETRICS & GYNECOLOGY

## 2021-11-24 PROCEDURE — 84702 CHORIONIC GONADOTROPIN TEST: CPT | Performed by: OBSTETRICS & GYNECOLOGY

## 2021-11-24 NOTE — PROGRESS NOTES
Chief Complaint  Routine Prenatal Visit (Follow up ultrasound to determine location of pregnancy, patient advised bleeding has increased. )     History of Present Illness:  Patient is 31 y.o.  who presents to [facility] OB [facility] for follow-up evaluation of pregnancy of unknown location.  Patient was seen on Monday as noted.  Patient had pelvic ultrasound as well as follow-up hCG level.  Patient reports since that time having worsening of her vaginal bleeding.  Patient has been passing small clots.  Patient denies any abdominal pain or cramping.  Patient does report having profound fatigue.  Patient denies any dizziness or feeling like she is going to pass out.  She denies any nausea or emesis.  Patient did not have repeat hCG level today.  Patient is here today with her significant other.    History  Past Medical History:   Diagnosis Date   • Patient denies medical problems      Current Outpatient Medications on File Prior to Visit   Medication Sig Dispense Refill   • acetaminophen (TYLENOL) 500 MG tablet Take 2 tablets by mouth Every 8 (Eight) Hours. Alternate with ibuprofen 60 tablet 0   • docusate sodium (Colace) 100 MG capsule Take 1 capsule by mouth 2 (Two) Times a Day. 60 capsule 0   • ferrous sulfate 325 (65 FE) MG tablet Take 1 tablet by mouth 2 (Two) Times a Day Before Meals. 60 tablet 6   • PRENATAL GUMMY VITAMIN Chew 1 each Daily.       No current facility-administered medications on file prior to visit.     Allergies   Allergen Reactions   • Ceclor [Cefaclor] Rash     Past Surgical History:   Procedure Laterality Date   • NO PAST SURGERIES     • TONSILLECTOMY AND ADENOIDECTOMY       History reviewed. No pertinent family history.  Social History     Socioeconomic History   • Marital status:    Tobacco Use   • Smoking status: Never Smoker   • Smokeless tobacco: Never Used   Vaping Use   • Vaping Use: Never used   Substance and Sexual Activity   • Alcohol use: No   • Drug use:  No   • Sexual activity: Yes     Partners: Male     Birth control/protection: None       Physical Examination:  Vital Signs: /74   Wt 79.8 kg (176 lb)   BMI 30.21 kg/m²     General Appearance: alert, appears stated age, and cooperative  Breasts: Not performed.  Abdomen: no masses, no hepatomegaly, no splenomegaly, soft non-tender, no guarding and no rebound tenderness  Pelvic: Not performed.    Data Review:  The following data was reviewed by: Deloris Leon MD on 11/24/2021:     Labs:  CBC (No Diff) (11/22/2021 14:11)  Comprehensive Metabolic Panel (11/22/2021 14:11)  hCG, Quantitative, Pregnancy (11/22/2021 14:12)    Imaging:  US Ob Transvaginal (11/24/2021 13:27)    Medical Records:  None    Assessment and Plan   Problem List Items Addressed This Visit     None      Visit Diagnoses     Pregnancy of unknown anatomic location    -  Primary  Patient with pregnancy of unknown location as noted.  Patient does report having heavy bleeding since her last visit.  The patient did not have repeat hCG level today.  The patient denies any abdominal or pelvic pain.  The patient did have her hCG level on Monday which returned at 5167.  Patient has been informed regarding those findings.  I have discussed with the patient and her significant other at length this is most likely an ectopic gestation.  Repeat transvaginal ultrasound was obtained today.  There is no intrauterine pregnancy seen.  The adnexal mass appears unchanged.  I discussed with the patient the option of methotrexate.  I discussed with the patient the option of surgical intervention.  I discussed with the patient the risk versus benefits of each.  The patient desires to repeat hCG level today.  Will order stat hCG level as noted.  Patient is to call for the results.  If the hCG level has not markedly decreased and I discussed with the patient the need for methotrexate or surgical intervention.  Instructions and precautions were given.  Patient is  instructed in no heavy lifting or straining.  Patient is also instructed in pelvic rest.  Instructions were given if patient becomes symptomatic with increase in her vaginal bleeding, abdominal or pelvic pain, dizziness or lightheadedness then patient is to go to the emergency room as discussed.    Relevant Orders    hCG, Quantitative, Pregnancy (Completed)    Adnexal mass      Patient with adnexal mass as noted.  The adnexal mass is most likely an ectopic gestation as discussed.  We will repeat hCG levels today as noted.  Patient is to call for the results.  Plan pending results.    Vaginal bleeding affecting early pregnancy      ED precautions and instructions have been given.          Follow Up/Instructions:  Follow up as noted.  Patient was given instructions and counseling regarding her condition or for health maintenance advice. Please see specific information pulled into the AVS if appropriate.     Note: Speech recognition transcription software may have been used to dictate portions of this document.  An attempt at proofreading has been made though minor errors in transcription may still be present.    This note was electronically signed.  Deloris Leon M.D.

## 2021-11-29 ENCOUNTER — DOCUMENTATION (OUTPATIENT)
Dept: OBSTETRICS AND GYNECOLOGY | Facility: CLINIC | Age: 31
End: 2021-11-29

## 2021-11-29 ENCOUNTER — ROUTINE PRENATAL (OUTPATIENT)
Dept: OBSTETRICS AND GYNECOLOGY | Facility: CLINIC | Age: 31
End: 2021-11-29

## 2021-11-29 ENCOUNTER — LAB (OUTPATIENT)
Dept: LAB | Facility: HOSPITAL | Age: 31
End: 2021-11-29

## 2021-11-29 VITALS — WEIGHT: 177 LBS | DIASTOLIC BLOOD PRESSURE: 80 MMHG | SYSTOLIC BLOOD PRESSURE: 124 MMHG | BODY MASS INDEX: 30.38 KG/M2

## 2021-11-29 DIAGNOSIS — Z30.011 VISIT FOR ORAL CONTRACEPTIVE PRESCRIPTION: ICD-10-CM

## 2021-11-29 DIAGNOSIS — Z79.631 ON METHOTREXATE THERAPY: ICD-10-CM

## 2021-11-29 DIAGNOSIS — R10.2 PELVIC PAIN: ICD-10-CM

## 2021-11-29 DIAGNOSIS — O00.101 RIGHT TUBAL PREGNANCY WITHOUT INTRAUTERINE PREGNANCY: ICD-10-CM

## 2021-11-29 DIAGNOSIS — O00.101 RIGHT TUBAL PREGNANCY WITHOUT INTRAUTERINE PREGNANCY: Primary | ICD-10-CM

## 2021-11-29 PROBLEM — O36.63X0 EXCESSIVE FETAL GROWTH AFFECTING MANAGEMENT OF MOTHER IN THIRD TRIMESTER, ANTEPARTUM: Status: RESOLVED | Noted: 2020-12-23 | Resolved: 2021-11-29

## 2021-11-29 LAB — HCG INTACT+B SERPL-ACNC: 5901 MIU/ML

## 2021-11-29 PROCEDURE — 36415 COLL VENOUS BLD VENIPUNCTURE: CPT

## 2021-11-29 PROCEDURE — 99214 OFFICE O/P EST MOD 30 MIN: CPT | Performed by: OBSTETRICS & GYNECOLOGY

## 2021-11-29 PROCEDURE — 84702 CHORIONIC GONADOTROPIN TEST: CPT

## 2021-11-29 RX ORDER — NORGESTIMATE AND ETHINYL ESTRADIOL 0.25-0.035
1 KIT ORAL DAILY
Qty: 84 TABLET | Refills: 2 | Status: SHIPPED | OUTPATIENT
Start: 2021-11-29 | End: 2022-04-26

## 2021-11-29 NOTE — PROGRESS NOTES
Late Entry:    Patient was called by me on the evening of 11/24/2021.  Patient had been informed regarding her stat hCG level.  Patient reported at that time having mild right lower quadrant pain.  Patient reports the pain was mild in nature.  Patient denies being dizzy or lightheaded.  Patient denies any changes in her spotting.  Given the long holiday weekend as well as the fact the patient has not had any previous pain, I recommended the patient come to the emergency room.  I discussed with the patient the plateauing of her hCG level was consistent and worrisome for an ectopic gestation.  Instructions and precautions were given.  Patient voiced understanding.  Patient reported she will go to the emergency room.

## 2021-11-29 NOTE — PROGRESS NOTES
GYN Office Visit    Subjective   Chief Complaint   Patient presents with   • Routine Prenatal Visit     TVS done today, was seen at  hospital and had methotrexate.     Tea Nelson is a 31 y.o. year old  presenting to be seen for follow-up methotrexate for an ectopic pregnancy.    She reports receiving the methotrexate injection last Wednesday for a right tubal ectopic pregnancy at .  Her most recent hCG was done on  and was 6100.  She did not follow-up for blood work after that injection.  She initially had worsening right lower quadrant pain, but feels much better now.  No significant pain at the moment.  She did have vaginal bleeding and passage of clots, but nothing at the moment.  No nausea or emesis.  No diarrhea.  No stomatitis.  They would very much like to be pregnant in the near future.    OB Hx:  x 3    Past Medical History:   Diagnosis Date   • Patient denies medical problems        Past Surgical History:   Procedure Laterality Date   • NO PAST SURGERIES     • TONSILLECTOMY AND ADENOIDECTOMY         No family history on file.     Social History     Tobacco Use   • Smoking status: Never Smoker   • Smokeless tobacco: Never Used   Vaping Use   • Vaping Use: Never used   Substance Use Topics   • Alcohol use: No   • Drug use: No       (Not in a hospital admission)      Ceclor [cefaclor]    Current Outpatient Medications on File Prior to Visit   Medication Sig Dispense Refill   • acetaminophen (TYLENOL) 500 MG tablet Take 2 tablets by mouth Every 8 (Eight) Hours. Alternate with ibuprofen 60 tablet 0   • docusate sodium (Colace) 100 MG capsule Take 1 capsule by mouth 2 (Two) Times a Day. 60 capsule 0   • ferrous sulfate 325 (65 FE) MG tablet Take 1 tablet by mouth 2 (Two) Times a Day Before Meals. 60 tablet 6   • PRENATAL GUMMY VITAMIN Chew 1 each Daily.       No current facility-administered medications on file prior to visit.       Social History    Tobacco Use      Smoking status: Never  Smoker      Smokeless tobacco: Never Used         Objective   /80   Wt 80.3 kg (177 lb)   LMP 03/16/2021   BMI 30.38 kg/m²     Physical Exam:  General Appearance: alert, pleasant, appears stated age, interactive and cooperative         Medical Decision Making:    I reviewed her most recent note with Dr. Leon.  I reviewed her most recent hCG values.    Assessment   Right tubal ectopic pregnancy s/p methotrexate  Desire for pregnancy     Plan    Orders Placed This Encounter   Procedures   • US Ob Transvaginal     Order Specific Question:   Reason for Exam:     Answer:   f/u ? ectopic   • hCG, Quantitative, Pregnancy     Standing Status:   Future     Number of Occurrences:   1     Standing Expiration Date:   11/29/2022     Order Specific Question:   Release to patient     Answer:   Immediate       Medication Management: None    Procedures Performed: None    We reviewed her situation in detail today.  We discussed the importance of trending hCG values.  We discussed the importance of preventing pregnancy for at least 3 months to allow the methotrexate to fully dissipate.  We discussed possible complications of methotrexate exposure in pregnancy.  We discussed birth control options.  She will start OCPs once her hCG value is negative.  We will follow-up her hCG value from today and discuss ongoing frequency for lab trending.  Call/return precautions reviewed.  All questions answered.    Thong Culver MD  Obstetrics and Gynecology  [facility]

## 2021-11-30 ENCOUNTER — TELEPHONE (OUTPATIENT)
Dept: OBSTETRICS AND GYNECOLOGY | Facility: CLINIC | Age: 31
End: 2021-11-30

## 2021-11-30 DIAGNOSIS — O00.101 RIGHT TUBAL PREGNANCY WITHOUT INTRAUTERINE PREGNANCY: Primary | ICD-10-CM

## 2021-11-30 NOTE — TELEPHONE ENCOUNTER
----- Message from Marguerite Dobson sent at 11/30/2021 10:22 AM EST -----  Regarding: LAB RESULTS  Patient is requesting a return call from Dr. Culver with lab results. Also, needs to know if she can go back to work.    Patient call back: [phone]

## 2021-12-01 ENCOUNTER — LAB (OUTPATIENT)
Dept: LAB | Facility: HOSPITAL | Age: 31
End: 2021-12-01

## 2021-12-01 DIAGNOSIS — O36.80X0 PREGNANCY WITH UNCERTAIN FETAL VIABILITY, SINGLE OR UNSPECIFIED FETUS: ICD-10-CM

## 2021-12-01 DIAGNOSIS — O00.101 RIGHT TUBAL PREGNANCY WITHOUT INTRAUTERINE PREGNANCY: Primary | ICD-10-CM

## 2021-12-01 DIAGNOSIS — O00.101 RIGHT TUBAL PREGNANCY WITHOUT INTRAUTERINE PREGNANCY: ICD-10-CM

## 2021-12-01 DIAGNOSIS — O36.80X0 PREGNANCY, LOCATION UNKNOWN: ICD-10-CM

## 2021-12-01 LAB — HCG INTACT+B SERPL-ACNC: 4597 MIU/ML

## 2021-12-01 PROCEDURE — 36415 COLL VENOUS BLD VENIPUNCTURE: CPT

## 2021-12-01 PROCEDURE — 84702 CHORIONIC GONADOTROPIN TEST: CPT

## 2021-12-03 ENCOUNTER — APPOINTMENT (OUTPATIENT)
Dept: ULTRASOUND IMAGING | Facility: HOSPITAL | Age: 31
End: 2021-12-03

## 2021-12-03 ENCOUNTER — HOSPITAL ENCOUNTER (OUTPATIENT)
Facility: HOSPITAL | Age: 31
Setting detail: OBSERVATION
Discharge: HOME | End: 2021-12-04
Attending: EMERGENCY MEDICINE

## 2021-12-03 DIAGNOSIS — O00.101 RIGHT TUBAL PREGNANCY WITHOUT INTRAUTERINE PREGNANCY: Primary | ICD-10-CM

## 2021-12-03 LAB
ALBUMIN SERPL-MCNC: 4.1 G/DL (ref 3.5–5.2)
ALBUMIN/GLOB SERPL: 1.3 G/DL
ALP SERPL-CCNC: 48 U/L (ref 39–117)
ALT SERPL W P-5'-P-CCNC: 12 U/L (ref 1–33)
ANION GAP SERPL CALCULATED.3IONS-SCNC: 11.3 MMOL/L (ref 5–15)
AST SERPL-CCNC: 21 U/L (ref 1–32)
BASOPHILS # BLD AUTO: 0.04 10*3/MM3 (ref 0–0.2)
BASOPHILS NFR BLD AUTO: 0.5 % (ref 0–1.5)
BILIRUB SERPL-MCNC: <0.2 MG/DL (ref 0–1.2)
BILIRUB UR QL STRIP: NEGATIVE
BUN SERPL-MCNC: 16 MG/DL (ref 6–20)
BUN/CREAT SERPL: 24.2 (ref 7–25)
CALCIUM SPEC-SCNC: 8.8 MG/DL (ref 8.6–10.5)
CHLORIDE SERPL-SCNC: 99 MMOL/L (ref 98–107)
CLARITY UR: CLEAR
CO2 SERPL-SCNC: 24.7 MMOL/L (ref 22–29)
COLOR UR: ABNORMAL
CREAT SERPL-MCNC: 0.66 MG/DL (ref 0.57–1)
DEPRECATED RDW RBC AUTO: 43.2 FL (ref 37–54)
EOSINOPHIL # BLD AUTO: 0.03 10*3/MM3 (ref 0–0.4)
EOSINOPHIL NFR BLD AUTO: 0.4 % (ref 0.3–6.2)
ERYTHROCYTE [DISTWIDTH] IN BLOOD BY AUTOMATED COUNT: 12.7 % (ref 12.3–15.4)
GFR SERPL CREATININE-BSD FRML MDRD: 103 ML/MIN/1.73
GLOBULIN UR ELPH-MCNC: 3.1 GM/DL
GLUCOSE SERPL-MCNC: 133 MG/DL (ref 65–99)
GLUCOSE UR STRIP-MCNC: NEGATIVE MG/DL
HCT VFR BLD AUTO: 30.4 % (ref 34–46.6)
HGB BLD-MCNC: 9.7 G/DL (ref 12–15.9)
HGB UR QL STRIP.AUTO: ABNORMAL
IMM GRANULOCYTES # BLD AUTO: 0.03 10*3/MM3 (ref 0–0.05)
IMM GRANULOCYTES NFR BLD AUTO: 0.4 % (ref 0–0.5)
KETONES UR QL STRIP: NEGATIVE
LEUKOCYTE ESTERASE UR QL STRIP.AUTO: ABNORMAL
LIPASE SERPL-CCNC: 39 U/L (ref 13–60)
LYMPHOCYTES # BLD AUTO: 2.53 10*3/MM3 (ref 0.7–3.1)
LYMPHOCYTES NFR BLD AUTO: 32.2 % (ref 19.6–45.3)
MCH RBC QN AUTO: 29.7 PG (ref 26.6–33)
MCHC RBC AUTO-ENTMCNC: 31.9 G/DL (ref 31.5–35.7)
MCV RBC AUTO: 93 FL (ref 79–97)
MONOCYTES # BLD AUTO: 0.61 10*3/MM3 (ref 0.1–0.9)
MONOCYTES NFR BLD AUTO: 7.8 % (ref 5–12)
NEUTROPHILS NFR BLD AUTO: 4.61 10*3/MM3 (ref 1.7–7)
NEUTROPHILS NFR BLD AUTO: 58.7 % (ref 42.7–76)
NITRITE UR QL STRIP: NEGATIVE
NRBC BLD AUTO-RTO: 0 /100 WBC (ref 0–0.2)
PH UR STRIP.AUTO: 5.5 [PH] (ref 5–8)
PLATELET # BLD AUTO: 350 10*3/MM3 (ref 140–450)
PMV BLD AUTO: 9.4 FL (ref 6–12)
POTASSIUM SERPL-SCNC: 4.4 MMOL/L (ref 3.5–5.2)
PROT SERPL-MCNC: 7.2 G/DL (ref 6–8.5)
PROT UR QL STRIP: ABNORMAL
RBC # BLD AUTO: 3.27 10*6/MM3 (ref 3.77–5.28)
SODIUM SERPL-SCNC: 135 MMOL/L (ref 136–145)
SP GR UR STRIP: 1.01 (ref 1–1.03)
UROBILINOGEN UR QL STRIP: ABNORMAL
WBC NRBC COR # BLD: 7.85 10*3/MM3 (ref 3.4–10.8)

## 2021-12-03 PROCEDURE — 25010000002 KETOROLAC TROMETHAMINE PER 15 MG: Performed by: EMERGENCY MEDICINE

## 2021-12-03 PROCEDURE — 87086 URINE CULTURE/COLONY COUNT: CPT | Performed by: EMERGENCY MEDICINE

## 2021-12-03 PROCEDURE — 80053 COMPREHEN METABOLIC PANEL: CPT | Performed by: EMERGENCY MEDICINE

## 2021-12-03 PROCEDURE — 81001 URINALYSIS AUTO W/SCOPE: CPT | Performed by: EMERGENCY MEDICINE

## 2021-12-03 PROCEDURE — 84702 CHORIONIC GONADOTROPIN TEST: CPT | Performed by: EMERGENCY MEDICINE

## 2021-12-03 PROCEDURE — 76817 TRANSVAGINAL US OBSTETRIC: CPT

## 2021-12-03 PROCEDURE — 83690 ASSAY OF LIPASE: CPT | Performed by: EMERGENCY MEDICINE

## 2021-12-03 PROCEDURE — 99284 EMERGENCY DEPT VISIT MOD MDM: CPT

## 2021-12-03 PROCEDURE — 85025 COMPLETE CBC W/AUTO DIFF WBC: CPT | Performed by: EMERGENCY MEDICINE

## 2021-12-03 RX ORDER — KETOROLAC TROMETHAMINE 30 MG/ML
15 INJECTION, SOLUTION INTRAMUSCULAR; INTRAVENOUS ONCE
Status: COMPLETED | OUTPATIENT
Start: 2021-12-03 | End: 2021-12-03

## 2021-12-03 RX ADMIN — KETOROLAC TROMETHAMINE 15 MG: 30 INJECTION, SOLUTION INTRAMUSCULAR; INTRAVENOUS at 23:13

## 2021-12-04 ENCOUNTER — HOSPITAL ENCOUNTER (OUTPATIENT)
Facility: HOSPITAL | Age: 31
Discharge: HOME | End: 2021-12-05
Attending: OBSTETRICS & GYNECOLOGY | Admitting: OBSTETRICS & GYNECOLOGY

## 2021-12-04 ENCOUNTER — ANESTHESIA EVENT (OUTPATIENT)
Dept: PERIOP | Facility: HOSPITAL | Age: 31
End: 2021-12-04

## 2021-12-04 ENCOUNTER — READMISSION MANAGEMENT (OUTPATIENT)
Dept: CALL CENTER | Facility: HOSPITAL | Age: 31
End: 2021-12-04

## 2021-12-04 ENCOUNTER — ANESTHESIA (OUTPATIENT)
Dept: PERIOP | Facility: HOSPITAL | Age: 31
End: 2021-12-04

## 2021-12-04 VITALS
SYSTOLIC BLOOD PRESSURE: 115 MMHG | BODY MASS INDEX: 30.11 KG/M2 | WEIGHT: 176.4 LBS | HEIGHT: 64 IN | RESPIRATION RATE: 16 BRPM | OXYGEN SATURATION: 98 % | HEART RATE: 78 BPM | TEMPERATURE: 97.9 F | DIASTOLIC BLOOD PRESSURE: 60 MMHG

## 2021-12-04 DIAGNOSIS — O00.101 RIGHT TUBAL PREGNANCY WITHOUT INTRAUTERINE PREGNANCY: Primary | ICD-10-CM

## 2021-12-04 DIAGNOSIS — O00.90 ECTOPIC PREGNANCY: ICD-10-CM

## 2021-12-04 LAB
ABO GROUP BLD: NORMAL
ALBUMIN SERPL-MCNC: 3.8 G/DL (ref 3.5–5.2)
ALBUMIN/GLOB SERPL: 1.5 G/DL
ALP SERPL-CCNC: 46 U/L (ref 39–117)
ALT SERPL W P-5'-P-CCNC: 9 U/L (ref 1–33)
ANION GAP SERPL CALCULATED.3IONS-SCNC: 11.3 MMOL/L (ref 5–15)
AST SERPL-CCNC: 10 U/L (ref 1–32)
BACTERIA UR QL AUTO: ABNORMAL /HPF
BASOPHILS # BLD AUTO: 0.02 10*3/MM3 (ref 0–0.2)
BASOPHILS NFR BLD AUTO: 0.2 % (ref 0–1.5)
BH BB BLOOD EXPIRATION DATE: NORMAL
BH BB BLOOD TYPE BARCODE: 5100
BH BB DISPENSE STATUS: NORMAL
BH BB PRODUCT CODE: NORMAL
BH BB UNIT NUMBER: NORMAL
BILIRUB SERPL-MCNC: <0.2 MG/DL (ref 0–1.2)
BLD GP AB SCN SERPL QL: NEGATIVE
BUN SERPL-MCNC: 8 MG/DL (ref 6–20)
BUN/CREAT SERPL: 15.1 (ref 7–25)
CALCIUM SPEC-SCNC: 7.8 MG/DL (ref 8.6–10.5)
CHLORIDE SERPL-SCNC: 101 MMOL/L (ref 98–107)
CO2 SERPL-SCNC: 22.7 MMOL/L (ref 22–29)
CREAT SERPL-MCNC: 0.53 MG/DL (ref 0.57–1)
CROSSMATCH INTERPRETATION: NORMAL
DEPRECATED RDW RBC AUTO: 41.6 FL (ref 37–54)
EOSINOPHIL # BLD AUTO: 0.04 10*3/MM3 (ref 0–0.4)
EOSINOPHIL NFR BLD AUTO: 0.4 % (ref 0.3–6.2)
ERYTHROCYTE [DISTWIDTH] IN BLOOD BY AUTOMATED COUNT: 12.6 % (ref 12.3–15.4)
GFR SERPL CREATININE-BSD FRML MDRD: 133 ML/MIN/1.73
GLOBULIN UR ELPH-MCNC: 2.5 GM/DL
GLUCOSE SERPL-MCNC: 107 MG/DL (ref 65–99)
HCG INTACT+B SERPL-ACNC: 2595 MIU/ML
HCT VFR BLD AUTO: 22.1 % (ref 34–46.6)
HGB BLD-MCNC: 7.2 G/DL (ref 12–15.9)
HYALINE CASTS UR QL AUTO: ABNORMAL /LPF
IMM GRANULOCYTES # BLD AUTO: 0.05 10*3/MM3 (ref 0–0.05)
IMM GRANULOCYTES NFR BLD AUTO: 0.4 % (ref 0–0.5)
LYMPHOCYTES # BLD AUTO: 2.09 10*3/MM3 (ref 0.7–3.1)
LYMPHOCYTES NFR BLD AUTO: 18.4 % (ref 19.6–45.3)
MCH RBC QN AUTO: 29.8 PG (ref 26.6–33)
MCHC RBC AUTO-ENTMCNC: 32.6 G/DL (ref 31.5–35.7)
MCV RBC AUTO: 91.3 FL (ref 79–97)
MONOCYTES # BLD AUTO: 1.03 10*3/MM3 (ref 0.1–0.9)
MONOCYTES NFR BLD AUTO: 9.1 % (ref 5–12)
MUCOUS THREADS URNS QL MICRO: ABNORMAL /HPF
NEUTROPHILS NFR BLD AUTO: 71.5 % (ref 42.7–76)
NEUTROPHILS NFR BLD AUTO: 8.12 10*3/MM3 (ref 1.7–7)
NRBC BLD AUTO-RTO: 0 /100 WBC (ref 0–0.2)
PLATELET # BLD AUTO: 309 10*3/MM3 (ref 140–450)
PMV BLD AUTO: 9.1 FL (ref 6–12)
POTASSIUM SERPL-SCNC: 3.2 MMOL/L (ref 3.5–5.2)
PROT SERPL-MCNC: 6.3 G/DL (ref 6–8.5)
RBC # BLD AUTO: 2.42 10*6/MM3 (ref 3.77–5.28)
RBC # UR STRIP: ABNORMAL /HPF
REF LAB TEST METHOD: ABNORMAL
RH BLD: POSITIVE
SARS-COV-2 RNA PNL SPEC NAA+PROBE: NOT DETECTED
SODIUM SERPL-SCNC: 135 MMOL/L (ref 136–145)
SQUAMOUS #/AREA URNS HPF: ABNORMAL /HPF
T&S EXPIRATION DATE: NORMAL
UNIT  ABO: NORMAL
UNIT  RH: NORMAL
WBC # UR STRIP: ABNORMAL /HPF
WBC NRBC COR # BLD: 11.35 10*3/MM3 (ref 3.4–10.8)

## 2021-12-04 PROCEDURE — 25010000002 DEXAMETHASONE PER 1 MG: Performed by: NURSE ANESTHETIST, CERTIFIED REGISTERED

## 2021-12-04 PROCEDURE — 25010000002 ONDANSETRON PER 1 MG: Performed by: NURSE ANESTHETIST, CERTIFIED REGISTERED

## 2021-12-04 PROCEDURE — 25010000002 FENTANYL CITRATE (PF) 100 MCG/2ML SOLUTION: Performed by: NURSE ANESTHETIST, CERTIFIED REGISTERED

## 2021-12-04 PROCEDURE — 86850 RBC ANTIBODY SCREEN: CPT | Performed by: OBSTETRICS & GYNECOLOGY

## 2021-12-04 PROCEDURE — G0378 HOSPITAL OBSERVATION PER HR: HCPCS

## 2021-12-04 PROCEDURE — 0 MEPERIDINE PER 100 MG: Performed by: NURSE ANESTHETIST, CERTIFIED REGISTERED

## 2021-12-04 PROCEDURE — 25010000002 HYDROMORPHONE 1 MG/ML SOLUTION: Performed by: NURSE ANESTHETIST, CERTIFIED REGISTERED

## 2021-12-04 PROCEDURE — 87635 SARS-COV-2 COVID-19 AMP PRB: CPT

## 2021-12-04 PROCEDURE — 85025 COMPLETE CBC W/AUTO DIFF WBC: CPT | Performed by: OBSTETRICS & GYNECOLOGY

## 2021-12-04 PROCEDURE — 36430 TRANSFUSION BLD/BLD COMPNT: CPT

## 2021-12-04 PROCEDURE — 59151 TREAT ECTOPIC PREGNANCY: CPT | Performed by: OBSTETRICS & GYNECOLOGY

## 2021-12-04 PROCEDURE — 25010000002 KETOROLAC TROMETHAMINE PER 15 MG: Performed by: NURSE ANESTHETIST, CERTIFIED REGISTERED

## 2021-12-04 PROCEDURE — 25010000002 METOCLOPRAMIDE PER 10 MG: Performed by: NURSE ANESTHETIST, CERTIFIED REGISTERED

## 2021-12-04 PROCEDURE — 87635 SARS-COV-2 COVID-19 AMP PRB: CPT | Performed by: OBSTETRICS & GYNECOLOGY

## 2021-12-04 PROCEDURE — 25010000002 PROPOFOL 200 MG/20ML EMULSION: Performed by: NURSE ANESTHETIST, CERTIFIED REGISTERED

## 2021-12-04 PROCEDURE — 86920 COMPATIBILITY TEST SPIN: CPT

## 2021-12-04 PROCEDURE — S0260 H&P FOR SURGERY: HCPCS | Performed by: OBSTETRICS & GYNECOLOGY

## 2021-12-04 PROCEDURE — 86900 BLOOD TYPING SEROLOGIC ABO: CPT | Performed by: OBSTETRICS & GYNECOLOGY

## 2021-12-04 PROCEDURE — 25010000002 MIDAZOLAM PER 1MG: Performed by: NURSE ANESTHETIST, CERTIFIED REGISTERED

## 2021-12-04 PROCEDURE — 80053 COMPREHEN METABOLIC PANEL: CPT | Performed by: OBSTETRICS & GYNECOLOGY

## 2021-12-04 PROCEDURE — 25010000002 SUCCINYLCHOLINE PER 20 MG: Performed by: NURSE ANESTHETIST, CERTIFIED REGISTERED

## 2021-12-04 PROCEDURE — P9016 RBC LEUKOCYTES REDUCED: HCPCS

## 2021-12-04 PROCEDURE — 86901 BLOOD TYPING SEROLOGIC RH(D): CPT | Performed by: OBSTETRICS & GYNECOLOGY

## 2021-12-04 PROCEDURE — 86900 BLOOD TYPING SEROLOGIC ABO: CPT

## 2021-12-04 RX ORDER — NEOSTIGMINE METHYLSULFATE 5 MG/5 ML
SYRINGE (ML) INTRAVENOUS AS NEEDED
Status: DISCONTINUED | OUTPATIENT
Start: 2021-12-04 | End: 2021-12-04 | Stop reason: SURG

## 2021-12-04 RX ORDER — DEXTROSE AND SODIUM CHLORIDE 5; .45 G/100ML; G/100ML
125 INJECTION, SOLUTION INTRAVENOUS CONTINUOUS
Status: DISCONTINUED | OUTPATIENT
Start: 2021-12-04 | End: 2021-12-05 | Stop reason: HOSPADM

## 2021-12-04 RX ORDER — METOCLOPRAMIDE HYDROCHLORIDE 5 MG/ML
INJECTION INTRAMUSCULAR; INTRAVENOUS AS NEEDED
Status: DISCONTINUED | OUTPATIENT
Start: 2021-12-04 | End: 2021-12-04 | Stop reason: SURG

## 2021-12-04 RX ORDER — OXYCODONE HYDROCHLORIDE AND ACETAMINOPHEN 5; 325 MG/1; MG/1
1 TABLET ORAL EVERY 6 HOURS PRN
Qty: 12 TABLET | Refills: 0 | Status: SHIPPED | OUTPATIENT
Start: 2021-12-04 | End: 2021-12-05 | Stop reason: HOSPADM

## 2021-12-04 RX ORDER — OXYCODONE HYDROCHLORIDE AND ACETAMINOPHEN 5; 325 MG/1; MG/1
1 TABLET ORAL ONCE
Status: COMPLETED | OUTPATIENT
Start: 2021-12-04 | End: 2021-12-04

## 2021-12-04 RX ORDER — PROPOFOL 10 MG/ML
INJECTION, EMULSION INTRAVENOUS AS NEEDED
Status: DISCONTINUED | OUTPATIENT
Start: 2021-12-04 | End: 2021-12-04 | Stop reason: SURG

## 2021-12-04 RX ORDER — ONDANSETRON 2 MG/ML
4 INJECTION INTRAMUSCULAR; INTRAVENOUS EVERY 6 HOURS PRN
Status: DISCONTINUED | OUTPATIENT
Start: 2021-12-04 | End: 2021-12-05 | Stop reason: HOSPADM

## 2021-12-04 RX ORDER — MIDAZOLAM HYDROCHLORIDE 2 MG/2ML
INJECTION, SOLUTION INTRAMUSCULAR; INTRAVENOUS AS NEEDED
Status: DISCONTINUED | OUTPATIENT
Start: 2021-12-04 | End: 2021-12-04 | Stop reason: SURG

## 2021-12-04 RX ORDER — NALOXONE HCL 0.4 MG/ML
0.1 VIAL (ML) INJECTION
Status: DISCONTINUED | OUTPATIENT
Start: 2021-12-04 | End: 2021-12-05 | Stop reason: HOSPADM

## 2021-12-04 RX ORDER — ACETAMINOPHEN 500 MG
1000 TABLET ORAL EVERY 8 HOURS
Status: DISCONTINUED | OUTPATIENT
Start: 2021-12-04 | End: 2021-12-05 | Stop reason: HOSPADM

## 2021-12-04 RX ORDER — DOCUSATE SODIUM 100 MG/1
100 CAPSULE, LIQUID FILLED ORAL 2 TIMES DAILY PRN
Status: DISCONTINUED | OUTPATIENT
Start: 2021-12-04 | End: 2021-12-05 | Stop reason: HOSPADM

## 2021-12-04 RX ORDER — MEPERIDINE HYDROCHLORIDE 25 MG/ML
25 INJECTION INTRAMUSCULAR; INTRAVENOUS; SUBCUTANEOUS
Status: COMPLETED | OUTPATIENT
Start: 2021-12-04 | End: 2021-12-04

## 2021-12-04 RX ORDER — KETAMINE HCL IN NACL, ISO-OSM 100MG/10ML
SYRINGE (ML) INJECTION AS NEEDED
Status: DISCONTINUED | OUTPATIENT
Start: 2021-12-04 | End: 2021-12-04 | Stop reason: SURG

## 2021-12-04 RX ORDER — ZOLPIDEM TARTRATE 5 MG/1
10 TABLET ORAL NIGHTLY PRN
Status: DISCONTINUED | OUTPATIENT
Start: 2021-12-04 | End: 2021-12-05 | Stop reason: HOSPADM

## 2021-12-04 RX ORDER — LIDOCAINE HYDROCHLORIDE 20 MG/ML
INJECTION, SOLUTION INTRAVENOUS AS NEEDED
Status: DISCONTINUED | OUTPATIENT
Start: 2021-12-04 | End: 2021-12-04 | Stop reason: SURG

## 2021-12-04 RX ORDER — OXYCODONE HYDROCHLORIDE 5 MG/1
5 TABLET ORAL EVERY 4 HOURS PRN
Status: DISCONTINUED | OUTPATIENT
Start: 2021-12-04 | End: 2021-12-05 | Stop reason: HOSPADM

## 2021-12-04 RX ORDER — OXYCODONE HYDROCHLORIDE 5 MG/1
10 TABLET ORAL EVERY 4 HOURS PRN
Status: DISCONTINUED | OUTPATIENT
Start: 2021-12-04 | End: 2021-12-05 | Stop reason: HOSPADM

## 2021-12-04 RX ORDER — MAGNESIUM HYDROXIDE 1200 MG/15ML
LIQUID ORAL AS NEEDED
Status: DISCONTINUED | OUTPATIENT
Start: 2021-12-04 | End: 2021-12-04 | Stop reason: HOSPADM

## 2021-12-04 RX ORDER — IPRATROPIUM BROMIDE AND ALBUTEROL SULFATE 2.5; .5 MG/3ML; MG/3ML
3 SOLUTION RESPIRATORY (INHALATION) ONCE
Status: DISCONTINUED | OUTPATIENT
Start: 2021-12-04 | End: 2021-12-04 | Stop reason: HOSPADM

## 2021-12-04 RX ORDER — NALOXONE HCL 0.4 MG/ML
0.4 VIAL (ML) INJECTION
Status: DISCONTINUED | OUTPATIENT
Start: 2021-12-04 | End: 2021-12-05 | Stop reason: HOSPADM

## 2021-12-04 RX ORDER — IBUPROFEN 800 MG/1
800 TABLET ORAL EVERY 8 HOURS
Status: DISCONTINUED | OUTPATIENT
Start: 2021-12-05 | End: 2021-12-05 | Stop reason: HOSPADM

## 2021-12-04 RX ORDER — SODIUM CHLORIDE, SODIUM LACTATE, POTASSIUM CHLORIDE, CALCIUM CHLORIDE 600; 310; 30; 20 MG/100ML; MG/100ML; MG/100ML; MG/100ML
1000 INJECTION, SOLUTION INTRAVENOUS CONTINUOUS
Status: DISCONTINUED | OUTPATIENT
Start: 2021-12-04 | End: 2021-12-04 | Stop reason: HOSPADM

## 2021-12-04 RX ORDER — SODIUM CHLORIDE 0.9 % (FLUSH) 0.9 %
10 SYRINGE (ML) INJECTION AS NEEDED
Status: DISCONTINUED | OUTPATIENT
Start: 2021-12-04 | End: 2021-12-04 | Stop reason: HOSPADM

## 2021-12-04 RX ORDER — DIPHENHYDRAMINE HYDROCHLORIDE 50 MG/ML
25 INJECTION INTRAMUSCULAR; INTRAVENOUS EVERY 6 HOURS PRN
Status: DISCONTINUED | OUTPATIENT
Start: 2021-12-04 | End: 2021-12-04 | Stop reason: HOSPADM

## 2021-12-04 RX ORDER — SODIUM CHLORIDE 9 MG/ML
INJECTION, SOLUTION INTRAVENOUS CONTINUOUS PRN
Status: DISCONTINUED | OUTPATIENT
Start: 2021-12-04 | End: 2021-12-04 | Stop reason: SURG

## 2021-12-04 RX ORDER — DEXAMETHASONE SODIUM PHOSPHATE 4 MG/ML
INJECTION, SOLUTION INTRA-ARTICULAR; INTRALESIONAL; INTRAMUSCULAR; INTRAVENOUS; SOFT TISSUE AS NEEDED
Status: DISCONTINUED | OUTPATIENT
Start: 2021-12-04 | End: 2021-12-04 | Stop reason: SURG

## 2021-12-04 RX ORDER — ONDANSETRON 2 MG/ML
INJECTION INTRAMUSCULAR; INTRAVENOUS AS NEEDED
Status: DISCONTINUED | OUTPATIENT
Start: 2021-12-04 | End: 2021-12-04 | Stop reason: SURG

## 2021-12-04 RX ORDER — SIMETHICONE 80 MG
80 TABLET,CHEWABLE ORAL 4 TIMES DAILY PRN
Status: DISCONTINUED | OUTPATIENT
Start: 2021-12-04 | End: 2021-12-05 | Stop reason: HOSPADM

## 2021-12-04 RX ORDER — ROCURONIUM BROMIDE 10 MG/ML
INJECTION, SOLUTION INTRAVENOUS AS NEEDED
Status: DISCONTINUED | OUTPATIENT
Start: 2021-12-04 | End: 2021-12-04 | Stop reason: SURG

## 2021-12-04 RX ORDER — FENTANYL CITRATE 50 UG/ML
INJECTION, SOLUTION INTRAMUSCULAR; INTRAVENOUS AS NEEDED
Status: DISCONTINUED | OUTPATIENT
Start: 2021-12-04 | End: 2021-12-04 | Stop reason: SURG

## 2021-12-04 RX ORDER — BISACODYL 10 MG
10 SUPPOSITORY, RECTAL RECTAL DAILY PRN
Status: DISCONTINUED | OUTPATIENT
Start: 2021-12-04 | End: 2021-12-05 | Stop reason: HOSPADM

## 2021-12-04 RX ORDER — ONDANSETRON 2 MG/ML
4 INJECTION INTRAMUSCULAR; INTRAVENOUS ONCE AS NEEDED
Status: DISCONTINUED | OUTPATIENT
Start: 2021-12-04 | End: 2021-12-04 | Stop reason: HOSPADM

## 2021-12-04 RX ORDER — ONDANSETRON 4 MG/1
4 TABLET, FILM COATED ORAL EVERY 6 HOURS PRN
Status: DISCONTINUED | OUTPATIENT
Start: 2021-12-04 | End: 2021-12-05 | Stop reason: HOSPADM

## 2021-12-04 RX ORDER — SUCCINYLCHOLINE CHLORIDE 20 MG/ML
INJECTION INTRAMUSCULAR; INTRAVENOUS AS NEEDED
Status: DISCONTINUED | OUTPATIENT
Start: 2021-12-04 | End: 2021-12-04 | Stop reason: SURG

## 2021-12-04 RX ORDER — LORAZEPAM 2 MG/ML
1 INJECTION INTRAMUSCULAR EVERY 4 HOURS PRN
Status: DISCONTINUED | OUTPATIENT
Start: 2021-12-04 | End: 2021-12-04 | Stop reason: HOSPADM

## 2021-12-04 RX ORDER — KETOROLAC TROMETHAMINE 30 MG/ML
INJECTION, SOLUTION INTRAMUSCULAR; INTRAVENOUS AS NEEDED
Status: DISCONTINUED | OUTPATIENT
Start: 2021-12-04 | End: 2021-12-04 | Stop reason: SURG

## 2021-12-04 RX ADMIN — ROCURONIUM BROMIDE 20 MG: 10 INJECTION INTRAVENOUS at 16:10

## 2021-12-04 RX ADMIN — PROPOFOL 150 MG: 10 INJECTION, EMULSION INTRAVENOUS at 16:03

## 2021-12-04 RX ADMIN — ZOLPIDEM TARTRATE 10 MG: 5 TABLET ORAL at 22:46

## 2021-12-04 RX ADMIN — MIDAZOLAM HYDROCHLORIDE 2 MG: 1 INJECTION, SOLUTION INTRAMUSCULAR; INTRAVENOUS at 15:54

## 2021-12-04 RX ADMIN — MEPERIDINE HYDROCHLORIDE 25 MG: 25 INJECTION, SOLUTION INTRAMUSCULAR; INTRAVENOUS; SUBCUTANEOUS at 18:06

## 2021-12-04 RX ADMIN — Medication 50 MG: at 16:03

## 2021-12-04 RX ADMIN — GLYCOPYRROLATE 0.6 MG: 0.2 INJECTION, SOLUTION INTRAMUSCULAR; INTRAVENOUS at 16:55

## 2021-12-04 RX ADMIN — ONDANSETRON 4 MG: 2 INJECTION INTRAMUSCULAR; INTRAVENOUS at 16:03

## 2021-12-04 RX ADMIN — SODIUM CHLORIDE: 9 INJECTION, SOLUTION INTRAVENOUS at 16:15

## 2021-12-04 RX ADMIN — OXYCODONE HYDROCHLORIDE AND ACETAMINOPHEN 1 TABLET: 5; 325 TABLET ORAL at 01:18

## 2021-12-04 RX ADMIN — KETOROLAC TROMETHAMINE 30 MG: 30 INJECTION, SOLUTION INTRAMUSCULAR at 16:58

## 2021-12-04 RX ADMIN — DEXTROSE AND SODIUM CHLORIDE 125 ML/HR: 5; 450 INJECTION, SOLUTION INTRAVENOUS at 18:25

## 2021-12-04 RX ADMIN — ACETAMINOPHEN 1000 MG: 500 TABLET ORAL at 20:25

## 2021-12-04 RX ADMIN — METOCLOPRAMIDE 10 MG: 5 INJECTION, SOLUTION INTRAMUSCULAR; INTRAVENOUS at 16:03

## 2021-12-04 RX ADMIN — HYDROMORPHONE HYDROCHLORIDE 0.5 MG: 1 INJECTION, SOLUTION INTRAMUSCULAR; INTRAVENOUS; SUBCUTANEOUS at 17:55

## 2021-12-04 RX ADMIN — LIDOCAINE HYDROCHLORIDE 100 MG: 20 INJECTION, SOLUTION INTRAVENOUS at 16:03

## 2021-12-04 RX ADMIN — DEXAMETHASONE SODIUM PHOSPHATE 10 MG: 4 INJECTION, SOLUTION INTRAMUSCULAR; INTRAVENOUS at 16:03

## 2021-12-04 RX ADMIN — ROCURONIUM BROMIDE 30 MG: 10 INJECTION INTRAVENOUS at 16:26

## 2021-12-04 RX ADMIN — HYDROMORPHONE HYDROCHLORIDE 0.5 MG: 1 INJECTION, SOLUTION INTRAMUSCULAR; INTRAVENOUS; SUBCUTANEOUS at 18:30

## 2021-12-04 RX ADMIN — HYDROMORPHONE HYDROCHLORIDE 0.5 MG: 1 INJECTION, SOLUTION INTRAMUSCULAR; INTRAVENOUS; SUBCUTANEOUS at 19:04

## 2021-12-04 RX ADMIN — SODIUM CHLORIDE, POTASSIUM CHLORIDE, SODIUM LACTATE AND CALCIUM CHLORIDE: 600; 310; 30; 20 INJECTION, SOLUTION INTRAVENOUS at 15:56

## 2021-12-04 RX ADMIN — OXYCODONE 10 MG: 5 TABLET ORAL at 20:26

## 2021-12-04 RX ADMIN — SUCCINYLCHOLINE CHLORIDE 140 MG: 20 INJECTION, SOLUTION INTRAMUSCULAR; INTRAVENOUS at 16:03

## 2021-12-04 RX ADMIN — FENTANYL CITRATE 100 MCG: 50 INJECTION INTRAMUSCULAR; INTRAVENOUS at 16:03

## 2021-12-04 RX ADMIN — HYDROMORPHONE HYDROCHLORIDE 0.5 MG: 1 INJECTION, SOLUTION INTRAMUSCULAR; INTRAVENOUS; SUBCUTANEOUS at 18:44

## 2021-12-04 RX ADMIN — GLYCOPYRROLATE 0.2 MG: 0.2 INJECTION, SOLUTION INTRAMUSCULAR; INTRAVENOUS at 16:03

## 2021-12-04 RX ADMIN — Medication 5 MG: at 16:55

## 2021-12-04 NOTE — ED NOTES
Dr. Leon called per Dr. Harvey with answer. Call transferred.      Florian Olmstead  12/04/21 0104

## 2021-12-04 NOTE — ED NOTES
Called to inform Dr. Leon that the patient has decided to leave.      Florian Olmstead  12/04/21 0158

## 2021-12-04 NOTE — ANESTHESIA PREPROCEDURE EVALUATION
Anesthesia Evaluation     Patient summary reviewed and Nursing notes reviewed   no history of anesthetic complications:  NPO Solid Status: > 8 hours  NPO Liquid Status: > 8 hours           Airway   Mallampati: II  TM distance: >3 FB  Neck ROM: full  no difficulty expected  Dental - normal exam     Pulmonary - negative pulmonary ROS and normal exam   Cardiovascular - normal exam    Rhythm: regular  Rate: normal    (+) hypertension,       Neuro/Psych- negative ROS  GI/Hepatic/Renal/Endo - negative ROS     Musculoskeletal (-) negative ROS    Abdominal    Substance History - negative use     OB/GYN    (+) Pregnant,         Other - negative ROS                       Anesthesia Plan    ASA 2 - emergent     general   (Risks and benefits discussed including risk of aspiration, recall and dental damage. All patient questions answered.    Patient told that a breathing tube will be used to manage the airway.    Will continue with plan of care.)  intravenous induction     Anesthetic plan, all risks, benefits, and alternatives have been provided, discussed and informed consent has been obtained with: patient.

## 2021-12-04 NOTE — ED PROVIDER NOTES
Subjective   31-year-old female presenting with abdominal pain.  She states that all day today she has had lower abdominal pain, this is mostly midline, she describes it as severe.  There are no alleviating or aggravating factors.  She denies any nausea, vomiting, shortness of breath, cough.  No urinary complaints.  She does note that she has recently been diagnosed with an ectopic pregnancy.  She received methotrexate about a week and a half ago.  She states that her hormone levels have been declining.          Review of Systems   Constitutional: Negative.    HENT: Negative.    Eyes: Negative.    Respiratory: Negative.    Cardiovascular: Negative.    Gastrointestinal: Positive for abdominal pain. Negative for diarrhea, nausea and vomiting.   Genitourinary: Negative.    Musculoskeletal: Negative.    Skin: Negative.    Neurological: Negative.    Psychiatric/Behavioral: Negative.        Past Medical History:   Diagnosis Date   • Patient denies medical problems        Allergies   Allergen Reactions   • Ceclor [Cefaclor] Rash       Past Surgical History:   Procedure Laterality Date   • NO PAST SURGERIES     • TONSILLECTOMY AND ADENOIDECTOMY         No family history on file.    Social History     Socioeconomic History   • Marital status:    Tobacco Use   • Smoking status: Never Smoker   • Smokeless tobacco: Never Used   Vaping Use   • Vaping Use: Never used   Substance and Sexual Activity   • Alcohol use: No   • Drug use: No   • Sexual activity: Yes     Partners: Male     Birth control/protection: None           Objective   Physical Exam  Constitutional:       General: She is not in acute distress.     Appearance: Normal appearance. She is not ill-appearing, toxic-appearing or diaphoretic.   HENT:      Head: Normocephalic and atraumatic.      Right Ear: External ear normal.      Left Ear: External ear normal.      Nose: Nose normal.      Mouth/Throat:      Mouth: Mucous membranes are moist.      Pharynx:  Oropharynx is clear.   Eyes:      Extraocular Movements: Extraocular movements intact.      Conjunctiva/sclera: Conjunctivae normal.      Pupils: Pupils are equal, round, and reactive to light.   Cardiovascular:      Rate and Rhythm: Normal rate and regular rhythm.      Pulses: Normal pulses.      Heart sounds: Normal heart sounds.   Pulmonary:      Effort: Pulmonary effort is normal. No respiratory distress.      Breath sounds: Normal breath sounds.   Abdominal:      General: Bowel sounds are normal. There is no distension.      Comments: Suprapubic tenderness   Musculoskeletal:         General: No swelling, tenderness or deformity. Normal range of motion.      Cervical back: Normal range of motion.   Skin:     General: Skin is warm and dry.      Capillary Refill: Capillary refill takes less than 2 seconds.      Findings: No rash.   Neurological:      General: No focal deficit present.      Mental Status: She is alert and oriented to person, place, and time.   Psychiatric:         Mood and Affect: Mood normal.         Behavior: Behavior normal.         Procedures           ED Course                                                 MDM  Number of Diagnoses or Management Options  Right tubal pregnancy without intrauterine pregnancy  Diagnosis management comments: 31-year-old female with abdominal pain in the setting of recently diagnosed ectopic pregnancy.  Well-developed, well-nourished young female no distress with exam as above.  Her heart rate is normal but her blood pressure is low normal.  Will obtain labs, repeat ultrasound.  Will give symptomatic treatment.  Disposition pending.    DDx: Ectopic pregnancy, UTI    hCG level continue to trend down, mild anemia noted.  Ultrasound reveals persistent cystic structure in the right adnexa, some new free fluid in the adnexa and cul-de-sac.  Patient states that she still having quite a bit of pain.  I discussed case Dr. Leon, she advised patient could be watched  overnight or safely discharged home with close follow-up.  Patient states that she still has quite a bit of pain, given that I recommended strongly that she stay in the hospital.  She preferred to go home and then changed her mind and wanted to stay.  We will admit overnight for further monitoring and evaluation.    Addendum: Shortly after placing admission orders I was called back into the room, apparently patient's  is very unhappy with the care that she has received tonight.  He was hoping there were some sort of other tests or procedures that we could do to find out what patient's pain is from.  I explained to him patient's pain was in fact coming from the ectopic pregnancy that was treated with methotrexate.  He was also upset that we had not given her good enough or more frequent enough pain medication.  He states that he can do a much better job at home so he is going to take her home.  Patient now states that she would like to go home.  I advised them it would be best to observe him overnight in the hospital but they are adamant they want to go home.  We recommended them to return immediately for any new or worsening symptoms.       Amount and/or Complexity of Data Reviewed  Decide to obtain previous medical records or to obtain history from someone other than the patient: yes        Final diagnoses:   Right tubal pregnancy without intrauterine pregnancy        Raymundo Harvey MD  12/04/21 2157

## 2021-12-04 NOTE — ED NOTES
During maintenance of IV, IV became d/c. Pt states she does not want another IV     Alisson Camacho RN  12/04/21 0002

## 2021-12-04 NOTE — OUTREACH NOTE
Prep Survey      Responses   [facility] patient discharged from? [facility]   Is LACE score < 7 ? Yes   Emergency Room discharge w/ pulse ox? No   Eligibility [facility]   Date of Admission 12/03/21   Date of Discharge 12/04/21   Discharge Disposition Home or Self Care   What are the reasons patient is not eligible? Other  [OP Proc]   Discharge diagnosis Right tubal pregnancy without intrauterine pregnancy   Does the patient have one of the following disease processes/diagnoses(primary or secondary)? Other   Does the patient have Home health ordered? No   Is there a DME ordered? No   Prep survey completed? Yes          Myah Sen RN

## 2021-12-04 NOTE — ANESTHESIA POSTPROCEDURE EVALUATION
Patient: Tea Nelson    Procedure Summary     Date: 12/04/21 Room / Location: [facility] OR 3 /  [facility] OR    Anesthesia Start: 1556 Anesthesia Stop: 1707    Procedure: LAPAROSCOPIC EXPLORATION FOR ECTOPIC PREGNANCY, RIGHT SALPINGECTOMY (N/A Abdomen) Diagnosis:     Surgeons: Deloris Leon MD Provider: Mychal Caldera CRNA    Anesthesia Type: general ASA Status: 2 - Emergent          Anesthesia Type: general    Vitals  Vitals Value Taken Time   /56 12/04/21 1727   Temp 97 °F (36.1 °C) 12/04/21 1709   Pulse 77 12/04/21 1728   Resp 21 12/04/21 1720   SpO2 100 % 12/04/21 1728   Vitals shown include unvalidated device data.          Post Anesthesia Care and Evaluation    Patient location during evaluation: PACU  Patient participation: complete - patient participated  Level of consciousness: awake  Pain score: 3  Pain management: adequate  Airway patency: patent  Anesthetic complications: No anesthetic complications  PONV Status: controlled  Cardiovascular status: acceptable and stable  Respiratory status: acceptable and face mask  Hydration status: acceptable

## 2021-12-05 VITALS
RESPIRATION RATE: 16 BRPM | DIASTOLIC BLOOD PRESSURE: 64 MMHG | HEART RATE: 87 BPM | SYSTOLIC BLOOD PRESSURE: 101 MMHG | TEMPERATURE: 98.5 F | OXYGEN SATURATION: 100 %

## 2021-12-05 LAB
BACTERIA SPEC AEROBE CULT: NO GROWTH
BASOPHILS # BLD AUTO: 0.03 10*3/MM3 (ref 0–0.2)
BASOPHILS NFR BLD AUTO: 0.3 % (ref 0–1.5)
DEPRECATED RDW RBC AUTO: 42.4 FL (ref 37–54)
DEPRECATED RDW RBC AUTO: 43.3 FL (ref 37–54)
EOSINOPHIL # BLD AUTO: 0.05 10*3/MM3 (ref 0–0.4)
EOSINOPHIL NFR BLD AUTO: 0.5 % (ref 0.3–6.2)
ERYTHROCYTE [DISTWIDTH] IN BLOOD BY AUTOMATED COUNT: 12.9 % (ref 12.3–15.4)
ERYTHROCYTE [DISTWIDTH] IN BLOOD BY AUTOMATED COUNT: 13.1 % (ref 12.3–15.4)
HCT VFR BLD AUTO: 21.3 % (ref 34–46.6)
HCT VFR BLD AUTO: 25.5 % (ref 34–46.6)
HGB BLD-MCNC: 7 G/DL (ref 12–15.9)
HGB BLD-MCNC: 8.2 G/DL (ref 12–15.9)
IMM GRANULOCYTES # BLD AUTO: 0.04 10*3/MM3 (ref 0–0.05)
IMM GRANULOCYTES NFR BLD AUTO: 0.4 % (ref 0–0.5)
LYMPHOCYTES # BLD AUTO: 2.92 10*3/MM3 (ref 0.7–3.1)
LYMPHOCYTES NFR BLD AUTO: 30 % (ref 19.6–45.3)
MCH RBC QN AUTO: 29.5 PG (ref 26.6–33)
MCH RBC QN AUTO: 30.2 PG (ref 26.6–33)
MCHC RBC AUTO-ENTMCNC: 32.2 G/DL (ref 31.5–35.7)
MCHC RBC AUTO-ENTMCNC: 32.9 G/DL (ref 31.5–35.7)
MCV RBC AUTO: 91.7 FL (ref 79–97)
MCV RBC AUTO: 91.8 FL (ref 79–97)
MONOCYTES # BLD AUTO: 0.83 10*3/MM3 (ref 0.1–0.9)
MONOCYTES NFR BLD AUTO: 8.5 % (ref 5–12)
NEUTROPHILS NFR BLD AUTO: 5.86 10*3/MM3 (ref 1.7–7)
NEUTROPHILS NFR BLD AUTO: 60.3 % (ref 42.7–76)
NRBC BLD AUTO-RTO: 0 /100 WBC (ref 0–0.2)
PLATELET # BLD AUTO: 228 10*3/MM3 (ref 140–450)
PLATELET # BLD AUTO: 234 10*3/MM3 (ref 140–450)
PMV BLD AUTO: 9 FL (ref 6–12)
PMV BLD AUTO: 9.4 FL (ref 6–12)
RBC # BLD AUTO: 2.32 10*6/MM3 (ref 3.77–5.28)
RBC # BLD AUTO: 2.78 10*6/MM3 (ref 3.77–5.28)
WBC NRBC COR # BLD: 9.54 10*3/MM3 (ref 3.4–10.8)
WBC NRBC COR # BLD: 9.73 10*3/MM3 (ref 3.4–10.8)

## 2021-12-05 PROCEDURE — G0378 HOSPITAL OBSERVATION PER HR: HCPCS

## 2021-12-05 PROCEDURE — 86900 BLOOD TYPING SEROLOGIC ABO: CPT

## 2021-12-05 PROCEDURE — 85027 COMPLETE CBC AUTOMATED: CPT | Performed by: OBSTETRICS & GYNECOLOGY

## 2021-12-05 PROCEDURE — 85025 COMPLETE CBC W/AUTO DIFF WBC: CPT | Performed by: OBSTETRICS & GYNECOLOGY

## 2021-12-05 PROCEDURE — 36430 TRANSFUSION BLD/BLD COMPNT: CPT

## 2021-12-05 PROCEDURE — P9016 RBC LEUKOCYTES REDUCED: HCPCS

## 2021-12-05 PROCEDURE — 99024 POSTOP FOLLOW-UP VISIT: CPT | Performed by: OBSTETRICS & GYNECOLOGY

## 2021-12-05 RX ORDER — OXYCODONE HYDROCHLORIDE 5 MG/1
5 TABLET ORAL EVERY 8 HOURS PRN
Qty: 15 TABLET | Refills: 0 | Status: SHIPPED | OUTPATIENT
Start: 2021-12-05 | End: 2022-04-26

## 2021-12-05 RX ORDER — FERROUS SULFATE 325(65) MG
325 TABLET ORAL
Qty: 60 TABLET | Refills: 0 | Status: SHIPPED | OUTPATIENT
Start: 2021-12-05 | End: 2022-04-26

## 2021-12-05 RX ORDER — ACETAMINOPHEN 500 MG
1000 TABLET ORAL EVERY 8 HOURS PRN
Qty: 60 TABLET | Refills: 0 | Status: SHIPPED | OUTPATIENT
Start: 2021-12-05 | End: 2022-04-26

## 2021-12-05 RX ORDER — DOCUSATE SODIUM 100 MG/1
100 CAPSULE, LIQUID FILLED ORAL 2 TIMES DAILY
Qty: 60 CAPSULE | Refills: 0 | Status: SHIPPED | OUTPATIENT
Start: 2021-12-05 | End: 2022-04-26

## 2021-12-05 RX ORDER — IBUPROFEN 800 MG/1
800 TABLET ORAL EVERY 8 HOURS PRN
Qty: 30 TABLET | Refills: 0 | Status: SHIPPED | OUTPATIENT
Start: 2021-12-05 | End: 2022-04-26

## 2021-12-05 RX ADMIN — OXYCODONE 10 MG: 5 TABLET ORAL at 00:28

## 2021-12-05 RX ADMIN — ACETAMINOPHEN 1000 MG: 500 TABLET ORAL at 10:57

## 2021-12-05 RX ADMIN — IBUPROFEN 800 MG: 800 TABLET ORAL at 09:01

## 2021-12-05 RX ADMIN — OXYCODONE 10 MG: 5 TABLET ORAL at 05:14

## 2021-12-05 RX ADMIN — OXYCODONE 10 MG: 5 TABLET ORAL at 09:47

## 2021-12-05 RX ADMIN — OXYCODONE 10 MG: 5 TABLET ORAL at 14:59

## 2021-12-06 LAB
BH BB BLOOD EXPIRATION DATE: NORMAL
BH BB BLOOD EXPIRATION DATE: NORMAL
BH BB BLOOD TYPE BARCODE: NORMAL
BH BB BLOOD TYPE BARCODE: NORMAL
BH BB DISPENSE STATUS: NORMAL
BH BB DISPENSE STATUS: NORMAL
BH BB PRODUCT CODE: NORMAL
BH BB PRODUCT CODE: NORMAL
BH BB UNIT NUMBER: NORMAL
BH BB UNIT NUMBER: NORMAL
CROSSMATCH INTERPRETATION: NORMAL
CROSSMATCH INTERPRETATION: NORMAL
UNIT  ABO: NORMAL
UNIT  ABO: NORMAL
UNIT  RH: NORMAL
UNIT  RH: NORMAL

## 2021-12-09 LAB
LAB AP CASE REPORT: NORMAL
PATH REPORT.FINAL DX SPEC: NORMAL

## 2021-12-14 ENCOUNTER — OFFICE VISIT (OUTPATIENT)
Dept: OBSTETRICS AND GYNECOLOGY | Facility: CLINIC | Age: 31
End: 2021-12-14

## 2021-12-14 VITALS
SYSTOLIC BLOOD PRESSURE: 112 MMHG | DIASTOLIC BLOOD PRESSURE: 68 MMHG | BODY MASS INDEX: 29.37 KG/M2 | WEIGHT: 172 LBS | HEIGHT: 64 IN

## 2021-12-14 DIAGNOSIS — R53.83 FATIGUE, UNSPECIFIED TYPE: ICD-10-CM

## 2021-12-14 DIAGNOSIS — D62 ANEMIA DUE TO ACUTE BLOOD LOSS: ICD-10-CM

## 2021-12-14 DIAGNOSIS — Z79.631 ON METHOTREXATE THERAPY: ICD-10-CM

## 2021-12-14 DIAGNOSIS — Z09 POSTOPERATIVE FOLLOW-UP: Primary | ICD-10-CM

## 2021-12-14 PROCEDURE — 99024 POSTOP FOLLOW-UP VISIT: CPT | Performed by: OBSTETRICS & GYNECOLOGY

## 2021-12-15 NOTE — PROGRESS NOTES
Chief Complaint  Post-op Follow-up (10 days post op exploratory lap for ectopic, right salpingectomy. )     History of Present Illness:  Patient is 31 y.o.  who presents to [facility] here for follow-up evaluation of her laparoscopy with right salpingectomy.  Patient reports she has been feeling well other than fatigue.  Patient had vaginal spotting only for 2 days.  Patient denies any abdominal pain or discomfort.  Patient has had normal voiding as well as normal bowel movements.  Patient is to start a new job which will involve possible lifting.  Patient has been doing light activities at home.  Patient has not been taking her iron supplements.  Patient was not aware the ferrous sulfate was iron.  Patient has not started her oral contraceptives at present.  Patient did receive the methotrexate.    History  Past Medical History:   Diagnosis Date   • Patient denies medical problems      Current Outpatient Medications on File Prior to Visit   Medication Sig Dispense Refill   • acetaminophen (TYLENOL) 500 MG tablet Take 2 tablets by mouth Every 8 (Eight) Hours As Needed for Mild Pain  or Moderate Pain . 60 tablet 0   • docusate sodium (Colace) 100 MG capsule Take 1 capsule by mouth 2 (Two) Times a Day. 60 capsule 0   • ferrous sulfate 325 (65 FE) MG tablet Take 1 tablet by mouth 2 (Two) Times a Day Before Meals. 60 tablet 0   • ibuprofen (ADVIL,MOTRIN) 800 MG tablet Take 1 tablet by mouth Every 8 (Eight) Hours As Needed for Mild Pain  or Moderate Pain . 30 tablet 0   • norgestimate-ethinyl estradiol (ORTHO-CYCLEN) 0.25-35 MG-MCG per tablet Take 1 tablet by mouth Daily. 84 tablet 2   • oxyCODONE (ROXICODONE) 5 MG immediate release tablet Take 1 tablet by mouth Every 8 (Eight) Hours As Needed for Moderate Pain  or Severe Pain . 15 tablet 0   • PRENATAL GUMMY VITAMIN Chew 1 each Daily.       No current facility-administered medications on file prior to visit.     Allergies   Allergen  "Reactions   • Ceclor [Cefaclor] Rash     Past Surgical History:   Procedure Laterality Date   • LAPAROSCOPIC EXPLORATION FOR ECTOPIC PREGNANCY N/A 12/4/2021    Procedure: LAPAROSCOPIC EXPLORATION FOR ECTOPIC PREGNANCY, RIGHT SALPINGECTOMY;  Surgeon: Deloris Leon MD;  Location: [facility];  Service: Obstetrics/Gynecology;  Laterality: N/A;   • NO PAST SURGERIES     • TONSILLECTOMY AND ADENOIDECTOMY       History reviewed. No pertinent family history.  Social History     Socioeconomic History   • Marital status:    Tobacco Use   • Smoking status: Never Smoker   • Smokeless tobacco: Never Used   Vaping Use   • Vaping Use: Never used   Substance and Sexual Activity   • Alcohol use: No   • Drug use: No   • Sexual activity: Yes     Partners: Male     Birth control/protection: None       Physical Examination:  Vital Signs: /68   Ht 162.6 cm (64\")   Wt 78 kg (172 lb)   BMI 29.52 kg/m²     General Appearance: alert, appears stated age, and cooperative  Breasts: Not performed.  Abdomen: no masses, no hepatomegaly, no splenomegaly, soft non-tender, no guarding, no rebound tenderness and Sutures removed.  Steri-Strips applied.  Incisions healing well.  Pelvic: Not performed.    Data Review:  The following data was reviewed by: Deloris Leon MD on 12/14/2021:     Labs:  Tissue Pathology Exam (12/04/2021 16:47)    Imaging:    Medical Records:  None    Assessment and Plan   Problem List Items Addressed This Visit       Other Visit Diagnoses     Postoperative follow-up    -  Primary  I reviewed with the patient her operative findings as well as pathology.  Patient understands she is at a 10 to 15% increased risk of repeat ectopic.  Note is given to return to work on January 4.  Instructions and precautions have been given regarding activities as well as follow-up.    On methotrexate therapy      Patient did receive the methotrexate as noted.  I discussed with the patient the need for reliable contraception.  Patient " is to start her oral contraceptives as discussed.  Patient is advised not to conceive for at least 3 months.  Patient voices understanding.    Fatigue, unspecified type      Patient with fatigue as noted.  Patient is instructed to start her ferrous sulfate as previously given.  Patient will return for CBC as discussed.    Relevant Orders    CBC (No Diff)    Anemia due to acute blood loss        Relevant Orders    CBC (No Diff)          Follow Up/Instructions:  Follow up as noted.  Patient was given instructions and counseling regarding her condition or for health maintenance advice. Please see specific information pulled into the AVS if appropriate.     Note: Speech recognition transcription software may have been used to dictate portions of this document.  An attempt at proofreading has been made though minor errors in transcription may still be present.    This note was electronically signed.  Deloris Leon M.D.

## 2021-12-30 ENCOUNTER — LAB (OUTPATIENT)
Dept: LAB | Facility: HOSPITAL | Age: 31
End: 2021-12-30

## 2021-12-30 ENCOUNTER — APPOINTMENT (OUTPATIENT)
Dept: LAB | Facility: HOSPITAL | Age: 31
End: 2021-12-30

## 2021-12-30 DIAGNOSIS — D62 ANEMIA DUE TO ACUTE BLOOD LOSS: Primary | ICD-10-CM

## 2021-12-30 DIAGNOSIS — D62 ANEMIA DUE TO ACUTE BLOOD LOSS: ICD-10-CM

## 2021-12-30 LAB
BASOPHILS # BLD AUTO: 0.06 10*3/MM3 (ref 0–0.2)
BASOPHILS NFR BLD AUTO: 0.8 % (ref 0–1.5)
DEPRECATED RDW RBC AUTO: 42.3 FL (ref 37–54)
EOSINOPHIL # BLD AUTO: 0.59 10*3/MM3 (ref 0–0.4)
EOSINOPHIL NFR BLD AUTO: 8.1 % (ref 0.3–6.2)
ERYTHROCYTE [DISTWIDTH] IN BLOOD BY AUTOMATED COUNT: 12.8 % (ref 12.3–15.4)
HCT VFR BLD AUTO: 33.8 % (ref 34–46.6)
HGB BLD-MCNC: 11 G/DL (ref 12–15.9)
IMM GRANULOCYTES # BLD AUTO: 0.04 10*3/MM3 (ref 0–0.05)
IMM GRANULOCYTES NFR BLD AUTO: 0.6 % (ref 0–0.5)
LYMPHOCYTES # BLD AUTO: 2.55 10*3/MM3 (ref 0.7–3.1)
LYMPHOCYTES NFR BLD AUTO: 35.2 % (ref 19.6–45.3)
MCH RBC QN AUTO: 30.3 PG (ref 26.6–33)
MCHC RBC AUTO-ENTMCNC: 32.5 G/DL (ref 31.5–35.7)
MCV RBC AUTO: 93.1 FL (ref 79–97)
MONOCYTES # BLD AUTO: 0.47 10*3/MM3 (ref 0.1–0.9)
MONOCYTES NFR BLD AUTO: 6.5 % (ref 5–12)
NEUTROPHILS NFR BLD AUTO: 3.54 10*3/MM3 (ref 1.7–7)
NEUTROPHILS NFR BLD AUTO: 48.8 % (ref 42.7–76)
NRBC BLD AUTO-RTO: 0 /100 WBC (ref 0–0.2)
PLATELET # BLD AUTO: 401 10*3/MM3 (ref 140–450)
PMV BLD AUTO: 10 FL (ref 6–12)
RBC # BLD AUTO: 3.63 10*6/MM3 (ref 3.77–5.28)
WBC NRBC COR # BLD: 7.25 10*3/MM3 (ref 3.4–10.8)

## 2021-12-30 PROCEDURE — 36415 COLL VENOUS BLD VENIPUNCTURE: CPT

## 2021-12-30 PROCEDURE — 85025 COMPLETE CBC W/AUTO DIFF WBC: CPT

## 2022-03-02 ENCOUNTER — TELEPHONE (OUTPATIENT)
Dept: OBSTETRICS AND GYNECOLOGY | Facility: CLINIC | Age: 32
End: 2022-03-02

## 2022-03-02 NOTE — TELEPHONE ENCOUNTER
----- Message from Shwetha Rojo sent at 3/2/2022 11:33 AM EST -----  Pt said she usually takes Trazodone or an ocm to help her sleep. She just found out she is pregnant & asked if there is anything safe she can still take.    RX: [facility]

## 2022-03-05 ENCOUNTER — HOSPITAL ENCOUNTER (EMERGENCY)
Facility: HOSPITAL | Age: 32
Discharge: HOME | End: 2022-03-05
Attending: EMERGENCY MEDICINE | Admitting: EMERGENCY MEDICINE

## 2022-03-05 VITALS
SYSTOLIC BLOOD PRESSURE: 130 MMHG | RESPIRATION RATE: 17 BRPM | BODY MASS INDEX: 29.39 KG/M2 | DIASTOLIC BLOOD PRESSURE: 72 MMHG | TEMPERATURE: 97.8 F | OXYGEN SATURATION: 100 % | HEIGHT: 65 IN | HEART RATE: 74 BPM | WEIGHT: 176.4 LBS

## 2022-03-05 DIAGNOSIS — N93.9 VAGINAL BLEEDING: Primary | ICD-10-CM

## 2022-03-05 LAB
B-HCG UR QL: NEGATIVE
BACTERIA UR QL AUTO: ABNORMAL /HPF
BILIRUB UR QL STRIP: NEGATIVE
CLARITY UR: CLEAR
COLOR UR: YELLOW
GLUCOSE UR STRIP-MCNC: NEGATIVE MG/DL
HCG INTACT+B SERPL-ACNC: 1.99 MIU/ML
HGB UR QL STRIP.AUTO: ABNORMAL
HYALINE CASTS UR QL AUTO: ABNORMAL /LPF
KETONES UR QL STRIP: NEGATIVE
LEUKOCYTE ESTERASE UR QL STRIP.AUTO: NEGATIVE
NITRITE UR QL STRIP: NEGATIVE
PH UR STRIP.AUTO: 7 [PH] (ref 5–8)
PROT UR QL STRIP: NEGATIVE
RBC # UR STRIP: ABNORMAL /HPF
REF LAB TEST METHOD: ABNORMAL
SP GR UR STRIP: 1.01 (ref 1–1.03)
SQUAMOUS #/AREA URNS HPF: ABNORMAL /HPF
UROBILINOGEN UR QL STRIP: ABNORMAL
WBC # UR STRIP: ABNORMAL /HPF

## 2022-03-05 PROCEDURE — 81001 URINALYSIS AUTO W/SCOPE: CPT | Performed by: PHYSICIAN ASSISTANT

## 2022-03-05 PROCEDURE — 81025 URINE PREGNANCY TEST: CPT | Performed by: PHYSICIAN ASSISTANT

## 2022-03-05 PROCEDURE — 99283 EMERGENCY DEPT VISIT LOW MDM: CPT

## 2022-03-05 PROCEDURE — 84702 CHORIONIC GONADOTROPIN TEST: CPT | Performed by: PHYSICIAN ASSISTANT

## 2022-03-05 PROCEDURE — 36415 COLL VENOUS BLD VENIPUNCTURE: CPT

## 2022-03-05 NOTE — ED PROVIDER NOTES
"Subjective   31-year-old female presents with pregnancy bleeding, she states she is really early pregnancy approximately \"2 weeks\" she had some spotting and then a gush of blood today.  Her last menstrual cycle was February 4.  She states that she had an ectopic pregnancy in December and so she has been told that she is high risk with any future pregnancies, she reports that she has been told if she has any problems that she needs to be evaluated early.  No abdominal pain no nausea vomiting no fever chills.      History provided by:  Patient   used: No        Review of Systems   Genitourinary: Positive for vaginal bleeding.   All other systems reviewed and are negative.      Past Medical History:   Diagnosis Date   • Patient denies medical problems        Allergies   Allergen Reactions   • Ceclor [Cefaclor] Rash       Past Surgical History:   Procedure Laterality Date   • LAPAROSCOPIC EXPLORATION FOR ECTOPIC PREGNANCY N/A 12/4/2021    Procedure: LAPAROSCOPIC EXPLORATION FOR ECTOPIC PREGNANCY, RIGHT SALPINGECTOMY;  Surgeon: Deloris Leon MD;  Location: [facility];  Service: Obstetrics/Gynecology;  Laterality: N/A;   • NO PAST SURGERIES     • TONSILLECTOMY AND ADENOIDECTOMY         History reviewed. No pertinent family history.    Social History     Socioeconomic History   • Marital status:    Tobacco Use   • Smoking status: Never Smoker   • Smokeless tobacco: Never Used   Vaping Use   • Vaping Use: Never used   Substance and Sexual Activity   • Alcohol use: No   • Drug use: No   • Sexual activity: Yes     Partners: Male     Birth control/protection: None           Objective   Physical Exam  Vitals and nursing note reviewed.   Constitutional:       Appearance: She is well-developed.   HENT:      Head: Normocephalic and atraumatic.      Mouth/Throat:      Mouth: Mucous membranes are moist.   Cardiovascular:      Rate and Rhythm: Normal rate and regular rhythm.   Pulmonary:      Effort: " Pulmonary effort is normal.      Breath sounds: Normal breath sounds.   Abdominal:      Palpations: Abdomen is soft.   Musculoskeletal:         General: Normal range of motion.      Cervical back: Normal range of motion and neck supple.   Skin:     General: Skin is warm and dry.   Neurological:      Mental Status: She is alert and oriented to person, place, and time.      Deep Tendon Reflexes: Reflexes are normal and symmetric.         Procedures           ED Course                                                 MDM  Number of Diagnoses or Management Options  Vaginal bleeding: new and requires workup  Risk of Complications, Morbidity, and/or Mortality  Presenting problems: low  Diagnostic procedures: low  Management options: low    Patient Progress  Patient progress: stable      Final diagnoses:   Vaginal bleeding       ED Disposition  ED Disposition     ED Disposition   Discharge    Condition   Stable    Comment   --             No follow-up provider specified.       Medication List      No changes were made to your prescriptions during this visit.          Yehuda Carreno Jr., PA-C  03/05/22 [id number]

## 2022-04-13 ENCOUNTER — TELEPHONE (OUTPATIENT)
Dept: OBSTETRICS AND GYNECOLOGY | Facility: CLINIC | Age: 32
End: 2022-04-13

## 2022-04-13 RX ORDER — FAMOTIDINE 20 MG/1
20 TABLET, FILM COATED ORAL 2 TIMES DAILY
Qty: 60 TABLET | Refills: 5 | Status: SHIPPED | OUTPATIENT
Start: 2022-04-13 | End: 2022-04-26

## 2022-04-13 RX ORDER — PROMETHAZINE HYDROCHLORIDE 25 MG/1
25 TABLET ORAL EVERY 6 HOURS PRN
Qty: 30 TABLET | Refills: 0 | Status: SHIPPED | OUTPATIENT
Start: 2022-04-13 | End: 2022-04-26

## 2022-04-13 NOTE — TELEPHONE ENCOUNTER
----- Message from Marguerite Dobson sent at 4/13/2022  9:10 AM EDT -----  Regarding: MEDICATION REQUEST  Patient is scheduled on 4/26/22 for her first New OB visit. She is having a lot of nausea and requesting something be sent in for her. She is an existing patient.    Patient call back: [phone]  Siddhartha Barnhart

## 2022-04-26 ENCOUNTER — INITIAL PRENATAL (OUTPATIENT)
Dept: OBSTETRICS AND GYNECOLOGY | Facility: CLINIC | Age: 32
End: 2022-04-26

## 2022-04-26 VITALS — DIASTOLIC BLOOD PRESSURE: 68 MMHG | BODY MASS INDEX: 29.41 KG/M2 | SYSTOLIC BLOOD PRESSURE: 108 MMHG | WEIGHT: 174 LBS

## 2022-04-26 DIAGNOSIS — Z34.91 PRENATAL CARE IN FIRST TRIMESTER: Primary | ICD-10-CM

## 2022-04-26 DIAGNOSIS — O09.899 SHORT INTERVAL BETWEEN PREGNANCIES AFFECTING PREGNANCY, ANTEPARTUM: ICD-10-CM

## 2022-04-26 DIAGNOSIS — O36.80X0 ENCOUNTER TO DETERMINE FETAL VIABILITY OF PREGNANCY, SINGLE OR UNSPECIFIED FETUS: ICD-10-CM

## 2022-04-26 PROCEDURE — 99214 OFFICE O/P EST MOD 30 MIN: CPT | Performed by: OBSTETRICS & GYNECOLOGY

## 2022-04-28 LAB
A VAGINAE DNA VAG QL NAA+PROBE: NORMAL SCORE
ABO GROUP BLD: ABNORMAL
BASOPHILS # BLD AUTO: 0.1 X10E3/UL (ref 0–0.2)
BASOPHILS NFR BLD AUTO: 1 %
BLD GP AB SCN SERPL QL: NEGATIVE
BVAB2 DNA VAG QL NAA+PROBE: NORMAL SCORE
C ALBICANS DNA VAG QL NAA+PROBE: NEGATIVE
C GLABRATA DNA VAG QL NAA+PROBE: NEGATIVE
C TRACH DNA VAG QL NAA+PROBE: NEGATIVE
EOSINOPHIL # BLD AUTO: 0.7 X10E3/UL (ref 0–0.4)
EOSINOPHIL NFR BLD AUTO: 9 %
ERYTHROCYTE [DISTWIDTH] IN BLOOD BY AUTOMATED COUNT: 13.4 % (ref 11.7–15.4)
HBV SURFACE AG SERPL QL IA: NEGATIVE
HCT VFR BLD AUTO: 32.6 % (ref 34–46.6)
HCV AB S/CO SERPL IA: <0.1 S/CO RATIO (ref 0–0.9)
HGB BLD-MCNC: 10.8 G/DL (ref 11.1–15.9)
HIV 1+2 AB+HIV1 P24 AG SERPL QL IA: NON REACTIVE
IMM GRANULOCYTES # BLD AUTO: 0 X10E3/UL (ref 0–0.1)
IMM GRANULOCYTES NFR BLD AUTO: 0 %
LYMPHOCYTES # BLD AUTO: 2.1 X10E3/UL (ref 0.7–3.1)
LYMPHOCYTES NFR BLD AUTO: 26 %
MCH RBC QN AUTO: 28.3 PG (ref 26.6–33)
MCHC RBC AUTO-ENTMCNC: 33.1 G/DL (ref 31.5–35.7)
MCV RBC AUTO: 85 FL (ref 79–97)
MEGA1 DNA VAG QL NAA+PROBE: NORMAL SCORE
MONOCYTES # BLD AUTO: 0.5 X10E3/UL (ref 0.1–0.9)
MONOCYTES NFR BLD AUTO: 6 %
N GONORRHOEA DNA VAG QL NAA+PROBE: NEGATIVE
NEUTROPHILS # BLD AUTO: 4.8 X10E3/UL (ref 1.4–7)
NEUTROPHILS NFR BLD AUTO: 58 %
PLATELET # BLD AUTO: 319 X10E3/UL (ref 150–450)
RBC # BLD AUTO: 3.82 X10E6/UL (ref 3.77–5.28)
RH BLD: POSITIVE
RPR SER QL: NON REACTIVE
RUBV IGG SERPL IA-ACNC: 1.98 INDEX
T VAGINALIS DNA VAG QL NAA+PROBE: NEGATIVE
WBC # BLD AUTO: 8.2 X10E3/UL (ref 3.4–10.8)

## 2022-04-29 DIAGNOSIS — D50.9 IRON DEFICIENCY ANEMIA DURING PREGNANCY: Primary | ICD-10-CM

## 2022-04-29 DIAGNOSIS — O99.019 IRON DEFICIENCY ANEMIA DURING PREGNANCY: Primary | ICD-10-CM

## 2022-04-29 RX ORDER — FERROUS SULFATE 325(65) MG
325 TABLET ORAL
Qty: 60 TABLET | Refills: 6 | Status: SHIPPED | OUTPATIENT
Start: 2022-04-29 | End: 2023-01-23

## 2022-05-02 PROBLEM — O09.899 SHORT INTERVAL BETWEEN PREGNANCIES AFFECTING PREGNANCY, ANTEPARTUM: Status: ACTIVE | Noted: 2022-05-02

## 2022-05-02 NOTE — PROGRESS NOTES
New Pregnancy Visit    Subjective   Chief Complaint   Patient presents with   • Initial Prenatal Visit     LMP 22, TVS done today 7w1d, last pap 20 WNL. No complaints       Tea Nelson is a 32 y.o. year old .  Patient's last menstrual period was 2022.  She presents to initiate prenatal care with our group today.     3 previous term vaginal births.  Most recent delivery was 2021.  Pelvis proven to 8 pounds 7 ounces.  Recent ectopic pregnancy requiring laparoscopic right salpingectomy in 2021.    Social History    Tobacco Use      Smoking status: Never Smoker      Smokeless tobacco: Never Used      Current Outpatient Medications on File Prior to Visit   Medication Sig Dispense Refill   • PRENATAL GUMMY VITAMIN Chew 1 each Daily.       No current facility-administered medications on file prior to visit.          Objective   /68   Wt 78.9 kg (174 lb)   LMP 2022   Breastfeeding Unknown   BMI 29.41 kg/m²   Physical Exam:  Normal, gestational age-appropriate exam today        Medical Decision Making:    Lab Review:   No data reviewed    Note Review:  Operative note for laparoscopy 2021  Delivery note for  in 2021    Imaging Review:  Pelvic ultrasound report   IUP measuring 7+1 weeks with appropriate fetal cardiac activity.  LIBAN is set at 12/10/2022 (sure LMP).  A 2 cm subchorionic hemorrhage is noted near the cervix.  The cervix and bilateral ovaries are normal in appearance. No free fluid in the cul-de-sac.  Assessment   1. IUP at 7+3 weeks  2. Supervision of high risk pregnancy   3. Short interval pregnancy  4. Recent ectopic pregnancy requiring salpingectomy     Plan    1. The problem list for pregnancy was initiated today  2. Tests/Orders/Rx for today:  Orders Placed This Encounter   Procedures   • NuSwab VG+ - Swab, Cervix     Order Specific Question:   Release to patient     Answer:   Immediate     Order Specific Question:   [facility] Has the  patient fasted?     Answer:   No   • US Ob Transvaginal     Order Specific Question:   Reason for Exam:     Answer:   NOB, dates, viability   • OB Panel With HIV     Order Specific Question:   Release to patient     Answer:   Immediate     Order Specific Question:   [facility] Has the patient fasted?     Answer:   No       Medication Management: None    3. Testing for GC / Chlamydia / trichomonas was done today  4. Genetic testing reviewed: she will consider the information and make a decision at a later date.  5. Information reviewed: exercise in pregnancy, nutrition in pregnancy, weight gain in pregnancy, work and travel restrictions during pregnancy, list of OTC medications acceptable in pregnancy and call coverage groups    Follow up: 4 week(s)    Thong Culver MD  Obstetrics and Gynecology  [facility]

## 2022-05-09 ENCOUNTER — REFERRAL TRIAGE (OUTPATIENT)
Dept: LABOR AND DELIVERY | Facility: HOSPITAL | Age: 32
End: 2022-05-09

## 2022-05-18 ENCOUNTER — PATIENT OUTREACH (OUTPATIENT)
Dept: LABOR AND DELIVERY | Facility: HOSPITAL | Age: 32
End: 2022-05-18

## 2022-05-18 NOTE — OUTREACH NOTE
[facility]  Enrollment    Current Estimated Gestational Age: 10w4d    Questions/Answers    Flowsheet Row Responses   Would like to participate? Yes   Date of Intake Visit 05/18/22            [facility]  Intake    Current Estimated Gestational Age: 10w4d    Questions/Answers    Flowsheet Row Responses   Best Method for Contacting Cell   Maternal Warning Signs Acknowledged   Warning signs comment: --  [Pt has no complaints at this time.]          Pt does not need any resources at this time. Plan to meet with pt after 20wks gestation for check-in.    Munira Villalobos RN  Maternity Nurse Navigator    5/18/2022, 15:23 EDT

## 2022-05-23 ENCOUNTER — ROUTINE PRENATAL (OUTPATIENT)
Dept: OBSTETRICS AND GYNECOLOGY | Facility: CLINIC | Age: 32
End: 2022-05-23

## 2022-05-23 VITALS — SYSTOLIC BLOOD PRESSURE: 120 MMHG | DIASTOLIC BLOOD PRESSURE: 82 MMHG | BODY MASS INDEX: 29.88 KG/M2 | WEIGHT: 176.8 LBS

## 2022-05-23 DIAGNOSIS — O21.9 NAUSEA AND VOMITING IN PREGNANCY PRIOR TO 22 WEEKS GESTATION: Primary | ICD-10-CM

## 2022-05-23 PROCEDURE — 99213 OFFICE O/P EST LOW 20 MIN: CPT | Performed by: OBSTETRICS & GYNECOLOGY

## 2022-05-23 RX ORDER — METOCLOPRAMIDE 10 MG/1
10 TABLET ORAL
Qty: 120 TABLET | Refills: 6 | Status: SHIPPED | OUTPATIENT
Start: 2022-05-23 | End: 2022-09-12

## 2022-05-23 NOTE — PROGRESS NOTES
Chief Complaint   Patient presents with   • Routine Prenatal Visit     Prenatal visit with no problems or concerns        HPI:   , 11w2d gestation reports doing well    ROS:  See Prenatal Episode/Flowsheet  /82   Wt 80.2 kg (176 lb 12.8 oz)   LMP 2022   BMI 29.88 kg/m²      EXAM:  EXTREMITIES:  No swelling-See Prenatal Episode/Flowsheet    ABDOMEN:  FHTs/Movement noted-See Prenatal Episode/Flowsheet    URINE GLUCOSE/PROTEIN:  See Prenatal Episode/Flowsheet    PELVIC EXAM:  See Prenatal Episode/Flowsheet  CV:  Lungs:  GYN:    MDM:    Lab Results   Component Value Date    HGB 10.8 (L) 2022    RUBELLAABIGG 1.98 2022    HEPBSAG Negative 2022    ABORH O Positive 2021    ABO O 2022    RH Positive 2022    ABSCRN Negative 2022    [id number] Non Reactive 2022    HEPCVIRUSABY <0.1 2022    STREPGPB Negative 2021    URINECX No growth 2021       U/S:US Ob Transvaginal (2022 13:06)      1. IUP 11w2d  2. Routine care   3. Declines gneetic testing  4. Anemia: taking Fe and PNV  5. D/C uniso, Trial of Reglan QID prn

## 2022-06-20 ENCOUNTER — ROUTINE PRENATAL (OUTPATIENT)
Dept: OBSTETRICS AND GYNECOLOGY | Facility: CLINIC | Age: 32
End: 2022-06-20

## 2022-06-20 VITALS — SYSTOLIC BLOOD PRESSURE: 110 MMHG | WEIGHT: 182.4 LBS | DIASTOLIC BLOOD PRESSURE: 74 MMHG | BODY MASS INDEX: 30.83 KG/M2

## 2022-06-20 DIAGNOSIS — Z34.92 SECOND TRIMESTER PREGNANCY: Primary | ICD-10-CM

## 2022-06-20 PROCEDURE — 99213 OFFICE O/P EST LOW 20 MIN: CPT | Performed by: OBSTETRICS & GYNECOLOGY

## 2022-06-20 NOTE — PROGRESS NOTES
Chief Complaint   Patient presents with   • Routine Prenatal Visit     Prenatal visit with no problems or concerns        HPI:   , 15w2d gestation reports doing well    ROS:  See Prenatal Episode/Flowsheet  /74   Wt 82.7 kg (182 lb 6.4 oz)   LMP 2022   BMI 30.83 kg/m²      EXAM:  EXTREMITIES:  No swelling-See Prenatal Episode/Flowsheet    ABDOMEN:  FHTs/Movement noted-See Prenatal Episode/Flowsheet    URINE GLUCOSE/PROTEIN:  See Prenatal Episode/Flowsheet    PELVIC EXAM:  See Prenatal Episode/Flowsheet  CV:  Lungs:  GYN:    MDM:    Lab Results   Component Value Date    HGB 10.8 (L) 2022    RUBELLAABIGG 1.98 2022    HEPBSAG Negative 2022    ABORH O Positive 2021    ABO O 2022    RH Positive 2022    ABSCRN Negative 2022    [id number] Non Reactive 2022    HEPCVIRUSABY <0.1 2022    STREPGPB Negative 2021    URINECX No growth 2021       U/S:    1. IUP 15w2d  2. Routine care   3. NVP: improved  4. Anatomy U/S next time  5. Anemia: taking FE and PNV

## 2022-07-11 ENCOUNTER — ROUTINE PRENATAL (OUTPATIENT)
Dept: OBSTETRICS AND GYNECOLOGY | Facility: CLINIC | Age: 32
End: 2022-07-11

## 2022-07-11 VITALS — BODY MASS INDEX: 30.93 KG/M2 | SYSTOLIC BLOOD PRESSURE: 112 MMHG | DIASTOLIC BLOOD PRESSURE: 70 MMHG | WEIGHT: 183 LBS

## 2022-07-11 DIAGNOSIS — O09.899 SHORT INTERVAL BETWEEN PREGNANCIES AFFECTING PREGNANCY, ANTEPARTUM: Primary | ICD-10-CM

## 2022-07-11 DIAGNOSIS — Z36.89 ENCOUNTER FOR FETAL ANATOMIC SURVEY: ICD-10-CM

## 2022-07-11 PROBLEM — O13.3 GESTATIONAL HTN, THIRD TRIMESTER: Status: RESOLVED | Noted: 2021-02-04 | Resolved: 2022-07-11

## 2022-07-11 PROCEDURE — 99213 OFFICE O/P EST LOW 20 MIN: CPT | Performed by: MIDWIFE

## 2022-07-11 NOTE — PROGRESS NOTES
Chief Complaint   Patient presents with   • Routine Prenatal Visit     Anatomy scan, No complaints/concerns        HPI: Tea is a  currently at 18w2d here for prenatal visit who reports the following:  She feels flutters. Denies any problems.                EXAM:     Vitals:    22 1533   BP: 112/70      Abdomen:   See prenatal flowsheet as noted and reviewed, soft, nontender   Pelvic:  See prenatal flowsheet as noted and reviewed   Urine:  See prenatal flowsheet as noted and reviewed    Lab Results   Component Value Date    ABORH O Positive 2021    ABO O 2022    RH Positive 2022    ABSCRN Negative 2022       MDM:  Impression: Anemia in pregnancy   Tests done today: U/S for anatomic screening - anatomy completely seen today   Topics discussed: kick counts and fetal movement  Reviewed OB labs   Tests next visit: none                RTO:                        4 weeks    This note was electronically signed.  Georgia Ortiz, APRN  2022

## 2022-07-29 ENCOUNTER — TELEPHONE (OUTPATIENT)
Dept: OBSTETRICS AND GYNECOLOGY | Facility: CLINIC | Age: 32
End: 2022-07-29

## 2022-07-29 RX ORDER — VITAMIN A, ASCORBIC ACID, CHOLECALCIFEROL, TOCOPHEROL, THIAMINE MONONITRATE, RIBOFLAVIN, PYRIDOXINE, FOLIC ACID, CYANOCOBALAMIN, CALCIUM CARBONATE, FERROUS FUMARATE, ZINC OXIDE, CUPRIC OXIDE, NIACINAMIDE, AND FISH OIL 27-1-250MG
1 KIT ORAL DAILY
Qty: 30 EACH | Refills: 11 | Status: SHIPPED | OUTPATIENT
Start: 2022-07-29 | End: 2022-08-28

## 2022-07-29 NOTE — TELEPHONE ENCOUNTER
----- Message from Cecy Hall MA sent at 7/29/2022  1:02 PM EDT -----  Patient is requesting RX for Prenatal Vitamins    Dr [facility] [place]    Thank You

## 2022-08-10 ENCOUNTER — ROUTINE PRENATAL (OUTPATIENT)
Dept: OBSTETRICS AND GYNECOLOGY | Facility: CLINIC | Age: 32
End: 2022-08-10

## 2022-08-10 VITALS — DIASTOLIC BLOOD PRESSURE: 64 MMHG | WEIGHT: 189.2 LBS | SYSTOLIC BLOOD PRESSURE: 110 MMHG | BODY MASS INDEX: 31.97 KG/M2

## 2022-08-10 DIAGNOSIS — Z34.92 SECOND TRIMESTER PREGNANCY: Primary | ICD-10-CM

## 2022-08-10 PROCEDURE — 99213 OFFICE O/P EST LOW 20 MIN: CPT | Performed by: OBSTETRICS & GYNECOLOGY

## 2022-08-10 RX ORDER — HYDROXYZINE PAMOATE 50 MG/1
50 CAPSULE ORAL NIGHTLY PRN
Qty: 30 CAPSULE | Refills: 4 | Status: SHIPPED | OUTPATIENT
Start: 2022-08-10 | End: 2023-01-23

## 2022-08-10 NOTE — PROGRESS NOTES
Chief Complaint   Patient presents with   • Routine Prenatal Visit     Prenatal visit with no problems or concerns        HPI:   , 22w4d gestation reports doing well    ROS:  See Prenatal Episode/Flowsheet  /64   Wt 85.8 kg (189 lb 3.2 oz)   LMP 2022   BMI 31.97 kg/m²      EXAM:  EXTREMITIES:  No swelling-See Prenatal Episode/Flowsheet    ABDOMEN:  FHTs/Movement noted-See Prenatal Episode/Flowsheet    URINE GLUCOSE/PROTEIN:  See Prenatal Episode/Flowsheet    PELVIC EXAM:  See Prenatal Episode/Flowsheet  CV:  Lungs:  GYN:    MDM:    Lab Results   Component Value Date    HGB 10.8 (L) 2022    RUBELLAABIGG 1.98 2022    HEPBSAG Negative 2022    ABORH O Positive 2021    ABO O 2022    RH Positive 2022    ABSCRN Negative 2022    [id number] Non Reactive 2022    HEPCVIRUSABY <0.1 2022    STREPGPB Negative 2021    URINECX No growth 2021       U/S:US Ob 14 + Weeks Single or First Gestation (2022 15:30)      1. IUP 22w4d  2. Routine care   3. Glucola CBC next time  4. Sleep issues: Vistaril 50mg poqhs

## 2022-09-12 ENCOUNTER — ROUTINE PRENATAL (OUTPATIENT)
Dept: OBSTETRICS AND GYNECOLOGY | Facility: CLINIC | Age: 32
End: 2022-09-12

## 2022-09-12 VITALS — SYSTOLIC BLOOD PRESSURE: 92 MMHG | WEIGHT: 193 LBS | BODY MASS INDEX: 32.62 KG/M2 | DIASTOLIC BLOOD PRESSURE: 60 MMHG

## 2022-09-12 DIAGNOSIS — O99.019 MATERNAL ANEMIA IN PREGNANCY, ANTEPARTUM: ICD-10-CM

## 2022-09-12 DIAGNOSIS — Z34.82 ENCOUNTER FOR SUPERVISION OF NORMAL PREGNANCY IN MULTIGRAVIDA IN SECOND TRIMESTER: Primary | ICD-10-CM

## 2022-09-12 DIAGNOSIS — Z3A.27 27 WEEKS GESTATION OF PREGNANCY: Primary | ICD-10-CM

## 2022-09-12 DIAGNOSIS — Z3A.27 27 WEEKS GESTATION OF PREGNANCY: ICD-10-CM

## 2022-09-12 DIAGNOSIS — O09.899 SHORT INTERVAL BETWEEN PREGNANCIES AFFECTING PREGNANCY, ANTEPARTUM: ICD-10-CM

## 2022-09-12 PROCEDURE — 99213 OFFICE O/P EST LOW 20 MIN: CPT | Performed by: STUDENT IN AN ORGANIZED HEALTH CARE EDUCATION/TRAINING PROGRAM

## 2022-09-12 NOTE — PROGRESS NOTES
Prenatal Care Visit    Subjective   Chief Complaint   Patient presents with   • Routine Prenatal Visit     Glucola. No complaints.       History:   Tea is a  currently at 27w2d who presents for a prenatal care visit today.    Doing well today. Denies CTX, VB, LOF. Reports +FM. GTT today.       Objective   /62   Wt 87.5 kg (193 lb)   LMP 2022   BMI 32.62 kg/m²   Physical Exam:  Normal, gestational age-appropriate exam today        Assessment & Plan     1. IUP @ 27w2d  2. Routine care: I have reviewed the prenatal labs and ultrasound(s) today. I have reviewed the most recent prenatal progress note(s). CBC and GTT today. Declines Tdap.     Diagnosis Plan   1. Encounter for supervision of normal pregnancy in multigravida in second trimester     2. Short interval between pregnancies affecting pregnancy, antepartum     3. Maternal anemia in pregnancy, antepartum     4. 27 weeks gestation of pregnancy  Glucose, Post Prandial 1 Hr    CBC & Differential        Medication Management:    Topics discussed: Prenatal care milestones  Iron supplementation - tolerating well, continue BID  TDAP vaccination   labor signs and symptoms  Postpartum contraception - unsure at this time, does not plan BTI   Tests next visit: none   Next visit: 2 week(s)     Genet Alcantara MD  Obstetrics and Gynecology  [facility]

## 2022-09-13 LAB
BASOPHILS # BLD AUTO: 0.02 10*3/MM3 (ref 0–0.2)
BASOPHILS NFR BLD AUTO: 0.3 % (ref 0–1.5)
EOSINOPHIL # BLD AUTO: 0.49 10*3/MM3 (ref 0–0.4)
EOSINOPHIL NFR BLD AUTO: 6.1 % (ref 0.3–6.2)
ERYTHROCYTE [DISTWIDTH] IN BLOOD BY AUTOMATED COUNT: 11.9 % (ref 12.3–15.4)
GLUCOSE 1H P 50 G GLC PO SERPL-MCNC: 138 MG/DL (ref 65–139)
HCT VFR BLD AUTO: 29 % (ref 34–46.6)
HGB BLD-MCNC: 9.7 G/DL (ref 12–15.9)
IMM GRANULOCYTES # BLD AUTO: 0.03 10*3/MM3 (ref 0–0.05)
IMM GRANULOCYTES NFR BLD AUTO: 0.4 % (ref 0–0.5)
LYMPHOCYTES # BLD AUTO: 1.95 10*3/MM3 (ref 0.7–3.1)
LYMPHOCYTES NFR BLD AUTO: 24.4 % (ref 19.6–45.3)
MCH RBC QN AUTO: 31.3 PG (ref 26.6–33)
MCHC RBC AUTO-ENTMCNC: 33.4 G/DL (ref 31.5–35.7)
MCV RBC AUTO: 93.5 FL (ref 79–97)
MONOCYTES # BLD AUTO: 0.32 10*3/MM3 (ref 0.1–0.9)
MONOCYTES NFR BLD AUTO: 4 % (ref 5–12)
NEUTROPHILS # BLD AUTO: 5.19 10*3/MM3 (ref 1.7–7)
NEUTROPHILS NFR BLD AUTO: 64.8 % (ref 42.7–76)
NRBC BLD AUTO-RTO: 0 /100 WBC (ref 0–0.2)
PLATELET # BLD AUTO: 272 10*3/MM3 (ref 140–450)
RBC # BLD AUTO: 3.1 10*6/MM3 (ref 3.77–5.28)
WBC # BLD AUTO: 8 10*3/MM3 (ref 3.4–10.8)

## 2022-09-13 NOTE — PROGRESS NOTES
Patient passed her sugar test.  Please make sure she is taking iron twice a day in addition to her prenatal vitamins.  Thank you

## 2022-09-26 ENCOUNTER — ROUTINE PRENATAL (OUTPATIENT)
Dept: OBSTETRICS AND GYNECOLOGY | Facility: CLINIC | Age: 32
End: 2022-09-26

## 2022-09-26 VITALS — WEIGHT: 198 LBS | SYSTOLIC BLOOD PRESSURE: 110 MMHG | DIASTOLIC BLOOD PRESSURE: 68 MMHG | BODY MASS INDEX: 33.46 KG/M2

## 2022-09-26 DIAGNOSIS — Z34.83 ENCOUNTER FOR SUPERVISION OF OTHER NORMAL PREGNANCY IN THIRD TRIMESTER: Primary | ICD-10-CM

## 2022-09-26 PROCEDURE — 99213 OFFICE O/P EST LOW 20 MIN: CPT | Performed by: MIDWIFE

## 2022-09-26 NOTE — PROGRESS NOTES
Chief Complaint   Patient presents with   • Routine Prenatal Visit     C/o pelvic pressure        HPI: Tea is a  currently at 29w2d here for prenatal visit who reports the following:  Baby is active. She denies ctxs or dysuria. She is having suprapubic and lower groin discomfort. She describes it mostly when she gets up in the mornings. It can be dull and achy or sharp.She mostly wears stretchy pants.                EXAM:     Vitals:    22 1540   BP: 110/68      Abdomen:   See prenatal flowsheet as noted and reviewed, soft, nontender   Pelvic:  See prenatal flowsheet as noted and reviewed cl/thick/-4   Urine:  See prenatal flowsheet as noted and reviewed    Lab Results   Component Value Date    ABORH O Positive 2021    ABO O 2022    RH Positive 2022    ABSCRN Negative 2022       MDM:  Impression: Supervision of low risk pregnancy  Anemia in pregnancy  Round ligament pain   Tests done today: none   Topics discussed: kick counts and fetal movement   labor signs and symptoms  Support garments/maternity belt. Tylenol PRN  Reviewed OB labs   Tests next visit: U/S for EFW                RTO:                        3 weeks    This note was electronically signed.  Georgia Ortiz, APRN  2022

## 2022-10-17 ENCOUNTER — ROUTINE PRENATAL (OUTPATIENT)
Dept: OBSTETRICS AND GYNECOLOGY | Facility: CLINIC | Age: 32
End: 2022-10-17

## 2022-10-17 VITALS — BODY MASS INDEX: 34.31 KG/M2 | WEIGHT: 203 LBS | DIASTOLIC BLOOD PRESSURE: 70 MMHG | SYSTOLIC BLOOD PRESSURE: 118 MMHG

## 2022-10-17 DIAGNOSIS — O09.899 SHORT INTERVAL BETWEEN PREGNANCIES AFFECTING PREGNANCY, ANTEPARTUM: Primary | ICD-10-CM

## 2022-10-17 DIAGNOSIS — Z36.89 ENCOUNTER FOR ULTRASOUND TO ASSESS FETAL GROWTH: ICD-10-CM

## 2022-10-17 PROCEDURE — 99213 OFFICE O/P EST LOW 20 MIN: CPT | Performed by: MIDWIFE

## 2022-10-17 NOTE — PROGRESS NOTES
Chief Complaint   Patient presents with   • Routine Prenatal Visit     No Complaints/concerns        HPI: Tea is a  currently at 32w2d here for prenatal visit who reports the following:  Baby is active. She continues to have pelvic pressure but no ctxs. She has been wearing some maternity pants.                EXAM:     Vitals:    10/17/22 1601   BP: 118/70      Abdomen:   See prenatal flowsheet as noted and reviewed, soft, nontender   Pelvic:  See prenatal flowsheet as noted and reviewed   Urine:  See prenatal flowsheet as noted and reviewed    Lab Results   Component Value Date    ABORH O Positive 2021    ABO O 2022    RH Positive 2022    ABSCRN Negative 2022       MDM:  Impression: Anemia in pregnancy   Tests done today: U/S for EFW - 4#10oz, 63%, HC 91%, AC 64%   Topics discussed: kick counts and fetal movement   labor signs and symptoms  Reviewed OB labs   Tests next visit: none                RTO:                        2 weeks    This note was electronically signed.  YO Lea  10/17/2022

## 2022-10-28 ENCOUNTER — HOSPITAL ENCOUNTER (OUTPATIENT)
Facility: HOSPITAL | Age: 32
End: 2022-10-28
Attending: MIDWIFE | Admitting: MIDWIFE

## 2022-10-28 ENCOUNTER — HOSPITAL ENCOUNTER (OUTPATIENT)
Facility: HOSPITAL | Age: 32
Discharge: HOME | End: 2022-10-28
Attending: MIDWIFE | Admitting: MIDWIFE

## 2022-10-28 VITALS
HEIGHT: 64 IN | SYSTOLIC BLOOD PRESSURE: 92 MMHG | DIASTOLIC BLOOD PRESSURE: 72 MMHG | RESPIRATION RATE: 18 BRPM | TEMPERATURE: 97.8 F | HEART RATE: 85 BPM | OXYGEN SATURATION: 99 % | BODY MASS INDEX: 34.49 KG/M2 | WEIGHT: 202 LBS

## 2022-10-28 LAB
BILIRUB BLD-MCNC: NEGATIVE MG/DL
CLARITY, POC: CLEAR
COLOR UR: YELLOW
GLUCOSE UR STRIP-MCNC: NEGATIVE MG/DL
KETONES UR QL: NEGATIVE
LEUKOCYTE EST, POC: NEGATIVE
NITRITE UR-MCNC: NEGATIVE MG/ML
PH UR: 6 [PH] (ref 5–8)
PROT UR STRIP-MCNC: NEGATIVE MG/DL
RBC # UR STRIP: NEGATIVE /UL
SP GR UR: 1.03 (ref 1–1.03)
UROBILINOGEN UR QL: NORMAL

## 2022-10-28 PROCEDURE — 81002 URINALYSIS NONAUTO W/O SCOPE: CPT | Performed by: MIDWIFE

## 2022-10-28 PROCEDURE — 59025 FETAL NON-STRESS TEST: CPT | Performed by: MIDWIFE

## 2022-10-28 PROCEDURE — 59025 FETAL NON-STRESS TEST: CPT

## 2022-10-28 PROCEDURE — G0463 HOSPITAL OUTPT CLINIC VISIT: HCPCS

## 2022-10-31 ENCOUNTER — ROUTINE PRENATAL (OUTPATIENT)
Dept: OBSTETRICS AND GYNECOLOGY | Facility: CLINIC | Age: 32
End: 2022-10-31

## 2022-10-31 VITALS — WEIGHT: 204 LBS | SYSTOLIC BLOOD PRESSURE: 120 MMHG | DIASTOLIC BLOOD PRESSURE: 72 MMHG | BODY MASS INDEX: 35.02 KG/M2

## 2022-10-31 DIAGNOSIS — O09.899 SHORT INTERVAL BETWEEN PREGNANCIES AFFECTING PREGNANCY, ANTEPARTUM: Primary | ICD-10-CM

## 2022-10-31 PROCEDURE — 99213 OFFICE O/P EST LOW 20 MIN: CPT | Performed by: MIDWIFE

## 2022-10-31 NOTE — PROGRESS NOTES
Chief Complaint   Patient presents with   • Routine Prenatal Visit     No COmplaints/concerns        HPI: Tea is a  currently at 34w2d here for prenatal visit who reports the following:  Baby is active. She denies any problems. She felt like her BP was elevated on 10/28 and went to [facility]. It was elevated and they advised she come to Dr. She went to  and BPs were normal and she was sent home. She denies any Headaches, blurred vision, or dizziness.                EXAM:     Vitals:    10/31/22 1333   BP: 120/72      Abdomen:   See prenatal flowsheet as noted and reviewed, soft, nontender   Pelvic:  See prenatal flowsheet as noted and reviewed   Urine:  See prenatal flowsheet as noted and reviewed    Lab Results   Component Value Date    ABORH O Positive 2021    ABO O 2022    RH Positive 2022    ABSCRN Negative 2022       MDM:  Impression: Anemia in pregnancy   Tests done today: none   Topics discussed: kick counts and fetal movement   labor signs and symptoms  Reviewed OB labs   Tests next visit: GBS testing                RTO:                        2 weeks    This note was electronically signed.  Georgia Ortiz, YO  10/31/2022

## 2022-11-14 ENCOUNTER — ROUTINE PRENATAL (OUTPATIENT)
Dept: OBSTETRICS AND GYNECOLOGY | Facility: CLINIC | Age: 32
End: 2022-11-14

## 2022-11-14 VITALS — DIASTOLIC BLOOD PRESSURE: 72 MMHG | WEIGHT: 209 LBS | BODY MASS INDEX: 35.87 KG/M2 | SYSTOLIC BLOOD PRESSURE: 128 MMHG

## 2022-11-14 DIAGNOSIS — O99.019 MATERNAL ANEMIA IN PREGNANCY, ANTEPARTUM: ICD-10-CM

## 2022-11-14 DIAGNOSIS — O09.93 ENCOUNTER FOR SUPERVISION OF HIGH RISK PREGNANCY IN THIRD TRIMESTER, ANTEPARTUM: Primary | ICD-10-CM

## 2022-11-14 DIAGNOSIS — R60.0 BILATERAL LEG EDEMA: ICD-10-CM

## 2022-11-14 DIAGNOSIS — O13.3 GESTATIONAL HTN, THIRD TRIMESTER: ICD-10-CM

## 2022-11-14 DIAGNOSIS — O36.63X0 EXCESSIVE FETAL GROWTH AFFECTING MANAGEMENT OF PREGNANCY IN THIRD TRIMESTER, SINGLE OR UNSPECIFIED FETUS: ICD-10-CM

## 2022-11-14 DIAGNOSIS — O09.899 SHORT INTERVAL BETWEEN PREGNANCIES AFFECTING PREGNANCY, ANTEPARTUM: ICD-10-CM

## 2022-11-14 DIAGNOSIS — Z36.85 ANTENATAL SCREENING FOR STREPTOCOCCUS B: ICD-10-CM

## 2022-11-14 PROCEDURE — 99214 OFFICE O/P EST MOD 30 MIN: CPT | Performed by: OBSTETRICS & GYNECOLOGY

## 2022-11-14 NOTE — PROGRESS NOTES
Chief Complaint  Routine Prenatal Visit (GBS done today, patient complains of pressure. )    History of Present Illness:  Tea is a  currently at 36w2d who presents today with complaints of pressure.  Patient has had occasional contractions.  Patient continues to have lower extremity edema as well as swelling in her hands.  Patient has had occasional headaches as well.  Patient was sent to [place] previously with her elevated blood pressure.  Patient did have a UA.  She did not have any labs at that time.  Patient has been taking her prenatal vitamins and iron supplements.  She denies leaking any fluid.  Patient denies any vaginal bleeding.  The patient does feel like this infant is larger than her previous children.  Patient had 3 previous deliveries over 8 pounds.    Exam:  Vitals:  See prenatal flowsheet as noted and reviewed  General: Alert, cooperative, and does not appear in any distress  Abdomen:   See prenatal flowsheet as noted and reviewed    Uterus gravid, non-tender; no palpable masses    No guarding or rebound tenderness  Pelvic:  See prenatal flowsheet as noted and reviewed  Ext:  See prenatal flowsheet as noted and reviewed    Moves extremities well, no cyanosis and no redness  Urine:  See prenatal flowsheet as noted and reviewed    Data Review:  The following data was reviewed by: Deloris Leon MD on 2022:  Prenatal Labs:  Lab Results   Component Value Date    HGB 9.7 (L) 2022    RUBELLAABIGG 1.98 2022    HEPBSAG Negative 2022    ABORH O Positive 2021    ABO O 2022    RH Positive 2022    ABSCRN Negative 2022    IEH5OVF6 Non Reactive 2022    HEPCVIRUSABY <0.1 2022    STREPGPB Negative 2021    URINECX No growth 2021       Admission on 10/28/2022, Discharged on 10/28/2022   Component Date Value   • Color 10/28/2022 Yellow    • Clarity, UA 10/28/2022 Clear    • Glucose, UA 10/28/2022 Negative    • Bilirubin 10/28/2022  Negative    • Ketones, UA 10/28/2022 Negative    • Specific Gravity  10/28/2022 1.030    • Blood, UA 10/28/2022 Negative    • pH, Urine 10/28/2022 6.0    • Protein, POC 10/28/2022 Negative    • Urobilinogen, UA 10/28/2022 Normal    • Leukocytes 10/28/2022 Negative    • Nitrite, UA 10/28/2022 Negative      Imaging:  US Ob Follow Up Transabdominal Approach  Impression:   IUP at 32+2 weeks. Limited anatomy was reviewed today and is normal in   appearance. EFW 2098g(62%) AC 64%. Cephalic presentation. Placenta is   anterior. Amniotic fluid and fetal heart rate are normal.    Thong Culver MD  Obstetrics and Gynecology  [facility]     Medical Records:  None    Assessment and Plan:  Problem List Items Addressed This Visit        Gravid and     Short interval between pregnancies affecting pregnancy, antepartum   Other Visit Diagnoses     Encounter for supervision of high risk pregnancy in third trimester, antepartum    -  Primary  Topics discussed:     iron supplementation  kick counts and fetal movement  PIH precautions   labor signs and symptoms  Scan next visit for growth  Labs today as noted.  Patient is to call for the results.  PIH precautions and instructions have been given.    Relevant Orders    CBC (No Diff)    Comprehensive Metabolic Panel    Protein / Creatinine Ratio, Urine - Urine, Clean Catch    US Ob Follow Up Transabdominal Approach     screening for streptococcus B        Relevant Orders    Strep Grp B CLIFFORD + Reflex - Swab, Vaginal/Rectum    Maternal anemia in pregnancy, antepartum      CBC today as noted    Relevant Orders    CBC (No Diff)    Excessive fetal growth affecting management of pregnancy in third trimester, single or unspecified fetus      Scan for growth next visit as discussed.    Gestational HTN, third trimester      Labs today as noted.  PIH precautions and instructions have been given.    Relevant Orders    CBC (No Diff)    Comprehensive Metabolic  Panel    Protein / Creatinine Ratio, Urine - Urine, Clean Catch    Bilateral leg edema      Patient is informed to keep her feet elevated and eliminate sodium.  Patient is to increase p.o. fluids as discussed.  PIH precautions and instructions have been given.        Follow Up/Instructions:  Follow up as scheduled.  Patient was given instructions and counseling regarding her condition or for health maintenance advice. Please see specific information pulled into the AVS if appropriate.     Note: Speech recognition transcription software may have been used to dictate portions of this document.  An attempt at proofreading has been made though minor errors in transcription may still be present.    This note was electronically signed.  Deloris Leon M.D.

## 2022-11-15 LAB
CREAT UR-MCNC: 44.5 MG/DL
PROT UR-MCNC: 5.6 MG/DL
PROT/CREAT UR: 125.8 MG/G CREA (ref 0–200)

## 2022-11-16 LAB
ERYTHROCYTE [DISTWIDTH] IN BLOOD BY AUTOMATED COUNT: 11.7 % (ref 12.3–15.4)
GP B STREP DNA SPEC QL NAA+PROBE: NEGATIVE
HCT VFR BLD AUTO: 31 % (ref 34–46.6)
HGB BLD-MCNC: 10.6 G/DL (ref 12–15.9)
MCH RBC QN AUTO: 31.3 PG (ref 26.6–33)
MCHC RBC AUTO-ENTMCNC: 34.2 G/DL (ref 31.5–35.7)
MCV RBC AUTO: 91.4 FL (ref 79–97)
PLATELET # BLD AUTO: 242 10*3/MM3 (ref 140–450)
RBC # BLD AUTO: 3.39 10*6/MM3 (ref 3.77–5.28)
WBC # BLD AUTO: 10.79 10*3/MM3 (ref 3.4–10.8)

## 2022-11-17 ENCOUNTER — HOSPITAL ENCOUNTER (OUTPATIENT)
Facility: HOSPITAL | Age: 32
Discharge: HOME | End: 2022-11-17
Attending: MIDWIFE | Admitting: MIDWIFE

## 2022-11-17 VITALS
WEIGHT: 213 LBS | OXYGEN SATURATION: 97 % | HEART RATE: 93 BPM | DIASTOLIC BLOOD PRESSURE: 69 MMHG | TEMPERATURE: 97.8 F | BODY MASS INDEX: 36.37 KG/M2 | HEIGHT: 64 IN | SYSTOLIC BLOOD PRESSURE: 127 MMHG | RESPIRATION RATE: 18 BRPM

## 2022-11-17 LAB
BILIRUB BLD-MCNC: NEGATIVE MG/DL
CLARITY, POC: CLEAR
COLOR UR: YELLOW
GLUCOSE UR STRIP-MCNC: NEGATIVE MG/DL
KETONES UR QL: NEGATIVE
LEUKOCYTE EST, POC: NEGATIVE
NITRITE UR-MCNC: NEGATIVE MG/ML
PH UR: 6 [PH] (ref 5–8)
PROT UR STRIP-MCNC: NEGATIVE MG/DL
RBC # UR STRIP: NEGATIVE /UL
SP GR UR: 1 (ref 1–1.03)
UROBILINOGEN UR QL: NORMAL

## 2022-11-17 PROCEDURE — G0463 HOSPITAL OUTPT CLINIC VISIT: HCPCS

## 2022-11-17 PROCEDURE — 59025 FETAL NON-STRESS TEST: CPT

## 2022-11-17 PROCEDURE — 59025 FETAL NON-STRESS TEST: CPT | Performed by: MIDWIFE

## 2022-11-17 PROCEDURE — 81002 URINALYSIS NONAUTO W/O SCOPE: CPT | Performed by: MIDWIFE

## 2022-11-17 NOTE — NON STRESS TEST
Triage Note - Nursing Documentation  Labor and Delivery Admission Log    Tea Nelson  : 1990  MRN: [id number]  CSN: [id number]    Date in / Time in:  2022  Time in: 1113    Date out / Time out:    Time out: 1250    Nurse: Homero Holloway RN    Patient Info: She is a 32 y.o. year old  at 36w5d with an LIBAN of 12/10/2022, by Last Menstrual Period who was seen on the [facility] [place].    Chief Complaint:   Chief Complaint   Patient presents with   • Pelvic Pain     REPORTS FEELING PRESSURE       Provider Instructions / Disposition: Pt presented to  with reports of pelvic and supra-pubic pressure.  Denies leaking of fluid.  Some irritability and Irregular ctx's noted.  Vaginal exam 1.5/50%/-3.  Discharged to home.  Has appointment on Monday.    Patient Active Problem List   Diagnosis   • Right tubal pregnancy without intrauterine pregnancy   • Ectopic pregnancy   • Short interval between pregnancies affecting pregnancy, antepartum       NST Documentation (Only applicable > 32 weeks): Interpretation A  Nonstress Test Interpretation A: Reactive (22 1250 : Homero Holloway, RN)

## 2022-11-21 ENCOUNTER — ROUTINE PRENATAL (OUTPATIENT)
Dept: OBSTETRICS AND GYNECOLOGY | Facility: CLINIC | Age: 32
End: 2022-11-21

## 2022-11-21 VITALS — SYSTOLIC BLOOD PRESSURE: 124 MMHG | BODY MASS INDEX: 36.22 KG/M2 | WEIGHT: 211 LBS | DIASTOLIC BLOOD PRESSURE: 82 MMHG

## 2022-11-21 DIAGNOSIS — O09.899 SHORT INTERVAL BETWEEN PREGNANCIES AFFECTING PREGNANCY, ANTEPARTUM: ICD-10-CM

## 2022-11-21 DIAGNOSIS — Z34.93 PRENATAL CARE IN THIRD TRIMESTER: Primary | ICD-10-CM

## 2022-11-21 PROCEDURE — 99213 OFFICE O/P EST LOW 20 MIN: CPT | Performed by: OBSTETRICS & GYNECOLOGY

## 2022-11-28 ENCOUNTER — ROUTINE PRENATAL (OUTPATIENT)
Dept: OBSTETRICS AND GYNECOLOGY | Facility: CLINIC | Age: 32
End: 2022-11-28

## 2022-11-28 VITALS — BODY MASS INDEX: 36.22 KG/M2 | DIASTOLIC BLOOD PRESSURE: 70 MMHG | SYSTOLIC BLOOD PRESSURE: 118 MMHG | WEIGHT: 211 LBS

## 2022-11-28 DIAGNOSIS — O09.899 SHORT INTERVAL BETWEEN PREGNANCIES AFFECTING PREGNANCY, ANTEPARTUM: Primary | ICD-10-CM

## 2022-11-28 PROCEDURE — 99213 OFFICE O/P EST LOW 20 MIN: CPT | Performed by: MIDWIFE

## 2022-11-28 NOTE — PROGRESS NOTES
Chief Complaint   Patient presents with   • Routine Prenatal Visit     No Complaints/concerns        HPI: Tea is a  currently at 38w2d here for prenatal visit who reports the following:  Baby is active. She has irregular ctxs. She denies VB or LOF                EXAM:     Vitals:    22 1528   BP: 118/70      Abdomen:   See prenatal flowsheet as noted and reviewed, soft, nontender   Pelvic:  See prenatal flowsheet as noted and reviewed 1/thick/-3 posterior, ballotable   Urine:  See prenatal flowsheet as noted and reviewed    Lab Results   Component Value Date    ABORH O Positive 2021    ABO O 2022    RH Positive 2022    ABSCRN Negative 2022       MDM:  Impression: Closely spaced pregnancies  Anemia in pregnancy   Tests done today: US for EFW 7#12oz, 69%, AC 82%, AF! 18.7, anterior placenta   Topics discussed: kick counts and fetal movement  labor signs and symptoms  Reviewed OB labs   Tests next visit: none                RTO:                        1 weeks    This note was electronically signed.  Georgia Ortiz, YO  2022

## 2022-12-05 ENCOUNTER — ROUTINE PRENATAL (OUTPATIENT)
Dept: OBSTETRICS AND GYNECOLOGY | Facility: CLINIC | Age: 32
End: 2022-12-05

## 2022-12-05 VITALS — DIASTOLIC BLOOD PRESSURE: 84 MMHG | BODY MASS INDEX: 35.98 KG/M2 | SYSTOLIC BLOOD PRESSURE: 110 MMHG | WEIGHT: 209.6 LBS

## 2022-12-05 DIAGNOSIS — Z34.93 THIRD TRIMESTER PREGNANCY: Primary | ICD-10-CM

## 2022-12-05 PROCEDURE — 99213 OFFICE O/P EST LOW 20 MIN: CPT | Performed by: OBSTETRICS & GYNECOLOGY

## 2022-12-05 RX ORDER — MULTIVIT 47/IRON/FOLATE 1/DHA 27-1-300MG
1 CAPSULE ORAL DAILY
COMMUNITY
Start: 2022-11-21 | End: 2023-01-23

## 2022-12-05 NOTE — PROGRESS NOTES
Chief Complaint   Patient presents with   • Routine Prenatal Visit     Prenatal visit with exam today. No problems or concerns.        HPI:   , 39w2d gestation reports doing well    ROS:  See Prenatal Episode/Flowsheet  /84   Wt 95.1 kg (209 lb 9.6 oz)   LMP 2022   BMI 35.98 kg/m²      EXAM:  EXTREMITIES:  No swelling-See Prenatal Episode/Flowsheet    ABDOMEN:  FHTs/Movement noted-See Prenatal Episode/Flowsheet    URINE GLUCOSE/PROTEIN:  See Prenatal Episode/Flowsheet    PELVIC EXAM:  See Prenatal Episode/Flowsheet  CV:  Lungs:  GYN:    MDM:    Lab Results   Component Value Date    HGB 10.6 (L) 2022    RUBELLAABIGG 1.98 2022    HEPBSAG Negative 2022    ABORH O Positive 2021    ABO O 2022    RH Positive 2022    ABSCRN Negative 2022    ZEJ7HKH3 Non Reactive 2022    HEPCVIRUSABY <0.1 2022    STREPGPB Negative 2022    URINECX No growth 2021       U/S:US Ob Follow Up Transabdominal Approach (2022 15:13)      1. IUP 39w2d  2. Routine care   3. Anemia: Taking Fe and PNV  4. Cervix 1-2/60%   Declines induction   Proven to 8#7

## 2022-12-07 ENCOUNTER — HOSPITAL ENCOUNTER (OUTPATIENT)
Facility: HOSPITAL | Age: 32
Discharge: HOME | End: 2022-12-08
Attending: OBSTETRICS & GYNECOLOGY | Admitting: OBSTETRICS & GYNECOLOGY

## 2022-12-07 LAB
FLUAV AG NPH QL: NEGATIVE
FLUBV AG NPH QL IA: NEGATIVE

## 2022-12-07 PROCEDURE — C9803 HOPD COVID-19 SPEC COLLECT: HCPCS

## 2022-12-07 PROCEDURE — 87635 SARS-COV-2 COVID-19 AMP PRB: CPT | Performed by: OBSTETRICS & GYNECOLOGY

## 2022-12-07 PROCEDURE — 87804 INFLUENZA ASSAY W/OPTIC: CPT | Performed by: OBSTETRICS & GYNECOLOGY

## 2022-12-07 PROCEDURE — 59025 FETAL NON-STRESS TEST: CPT

## 2022-12-07 PROCEDURE — G0463 HOSPITAL OUTPT CLINIC VISIT: HCPCS

## 2022-12-07 RX ORDER — EPHEDRINE SULFATE 5 MG/ML
10 INJECTION INTRAVENOUS
Status: DISCONTINUED | OUTPATIENT
Start: 2022-12-07 | End: 2022-12-08 | Stop reason: HOSPADM

## 2022-12-08 VITALS
HEART RATE: 104 BPM | HEIGHT: 64 IN | RESPIRATION RATE: 22 BRPM | DIASTOLIC BLOOD PRESSURE: 87 MMHG | SYSTOLIC BLOOD PRESSURE: 123 MMHG | OXYGEN SATURATION: 100 % | TEMPERATURE: 98 F | WEIGHT: 212 LBS | BODY MASS INDEX: 36.19 KG/M2

## 2022-12-08 LAB — SARS-COV-2 RNA PNL SPEC NAA+PROBE: NOT DETECTED

## 2022-12-08 PROCEDURE — G0463 HOSPITAL OUTPT CLINIC VISIT: HCPCS

## 2022-12-08 PROCEDURE — 59025 FETAL NON-STRESS TEST: CPT

## 2022-12-08 NOTE — NON STRESS TEST
Triage Note - Nursing Documentation  Labor and Delivery Admission Log    Tea Nelson  : 1990  MRN: [id number]  CSN: [id number]    Date in / Time in 22  Time in:       Date out / Time out:    Time out: 12      Nurse: Elvi Caldwell, RN    Patient Info: She is a 32 y.o. year old  at 39w5d with an LIBAN of 12/10/2022, by Last Menstrual Period who was seen on the [facility] [place].    Chief Complaint:   Chief Complaint   Patient presents with   • Shortness of Breath       Provider Instructions / Disposition: 39.4 weeks  presented to [place] with complaint of sob  Placed on EFM pulse o2 98%, respiration 22, nonproductive coughing mild nasal congestion, having no contractions  Reactive NST  Swab for flu and covid,  Both negative  Home with appointment on Monday and instructions on how to treat cold    Patient Active Problem List   Diagnosis   • Right tubal pregnancy without intrauterine pregnancy   • Ectopic pregnancy   • Short interval between pregnancies affecting pregnancy, antepartum       NST Documentation (Only applicable > 32 weeks):

## 2022-12-09 ENCOUNTER — TELEPHONE (OUTPATIENT)
Dept: OBSTETRICS AND GYNECOLOGY | Facility: CLINIC | Age: 32
End: 2022-12-09

## 2022-12-09 NOTE — TELEPHONE ENCOUNTER
Provider: ALLEGRA  Caller: SANDER  Relationship to Patient: SELF  Phone Number: [phone] ANYTIME AND DOES NOT HAVE VOICE MAIL  Reason for Call: SEEN AT URGENT CARE TODAY FOR RESPIRATORY ISSUES; WAS GIVEN AN INHALER AND ANTIBIOTIC BUT PT IS 40 WEEKS TOMORROW AND IS IN DISCOMFORT; CAN'T CATCH HER BREATH. BLOOD PRESSURE WAS HIGH AT APPT.   ATTEMPTED TO WARM TRANSFER, BUT NO ANSWER.

## 2022-12-10 ENCOUNTER — HOSPITAL ENCOUNTER (INPATIENT)
Facility: HOSPITAL | Age: 32
LOS: 1 days | Discharge: HOME | End: 2022-12-11
Attending: OBSTETRICS & GYNECOLOGY | Admitting: OBSTETRICS & GYNECOLOGY

## 2022-12-10 PROBLEM — Z34.90 PREGNANCY: Status: ACTIVE | Noted: 2022-12-10

## 2022-12-10 LAB
ABO GROUP BLD: NORMAL
BASOPHILS # BLD AUTO: 0.07 10*3/MM3 (ref 0–0.2)
BASOPHILS NFR BLD AUTO: 0.7 % (ref 0–1.5)
BLD GP AB SCN SERPL QL: NEGATIVE
DEPRECATED RDW RBC AUTO: 40.6 FL (ref 37–54)
EOSINOPHIL # BLD AUTO: 0.28 10*3/MM3 (ref 0–0.4)
EOSINOPHIL NFR BLD AUTO: 2.8 % (ref 0.3–6.2)
ERYTHROCYTE [DISTWIDTH] IN BLOOD BY AUTOMATED COUNT: 12.2 % (ref 12.3–15.4)
HCT VFR BLD AUTO: 36 % (ref 34–46.6)
HGB BLD-MCNC: 12.2 G/DL (ref 12–15.9)
IMM GRANULOCYTES # BLD AUTO: 0.03 10*3/MM3 (ref 0–0.05)
IMM GRANULOCYTES NFR BLD AUTO: 0.3 % (ref 0–0.5)
LYMPHOCYTES # BLD AUTO: 2.44 10*3/MM3 (ref 0.7–3.1)
LYMPHOCYTES NFR BLD AUTO: 24.8 % (ref 19.6–45.3)
MCH RBC QN AUTO: 30.9 PG (ref 26.6–33)
MCHC RBC AUTO-ENTMCNC: 33.9 G/DL (ref 31.5–35.7)
MCV RBC AUTO: 91.1 FL (ref 79–97)
MONOCYTES # BLD AUTO: 0.59 10*3/MM3 (ref 0.1–0.9)
MONOCYTES NFR BLD AUTO: 6 % (ref 5–12)
NEUTROPHILS NFR BLD AUTO: 6.43 10*3/MM3 (ref 1.7–7)
NEUTROPHILS NFR BLD AUTO: 65.4 % (ref 42.7–76)
NRBC BLD AUTO-RTO: 0 /100 WBC (ref 0–0.2)
PLATELET # BLD AUTO: 226 10*3/MM3 (ref 140–450)
PMV BLD AUTO: 11.7 FL (ref 6–12)
RBC # BLD AUTO: 3.95 10*6/MM3 (ref 3.77–5.28)
RH BLD: POSITIVE
SARS-COV-2 RNA PNL SPEC NAA+PROBE: NOT DETECTED
T&S EXPIRATION DATE: NORMAL
WBC NRBC COR # BLD: 9.84 10*3/MM3 (ref 3.4–10.8)

## 2022-12-10 PROCEDURE — 59410 OBSTETRICAL CARE: CPT | Performed by: OBSTETRICS & GYNECOLOGY

## 2022-12-10 PROCEDURE — 86901 BLOOD TYPING SEROLOGIC RH(D): CPT | Performed by: OBSTETRICS & GYNECOLOGY

## 2022-12-10 PROCEDURE — 85025 COMPLETE CBC W/AUTO DIFF WBC: CPT | Performed by: OBSTETRICS & GYNECOLOGY

## 2022-12-10 PROCEDURE — G0463 HOSPITAL OUTPT CLINIC VISIT: HCPCS

## 2022-12-10 PROCEDURE — 86900 BLOOD TYPING SEROLOGIC ABO: CPT | Performed by: OBSTETRICS & GYNECOLOGY

## 2022-12-10 PROCEDURE — 59025 FETAL NON-STRESS TEST: CPT

## 2022-12-10 PROCEDURE — 86850 RBC ANTIBODY SCREEN: CPT | Performed by: OBSTETRICS & GYNECOLOGY

## 2022-12-10 PROCEDURE — 87635 SARS-COV-2 COVID-19 AMP PRB: CPT | Performed by: OBSTETRICS & GYNECOLOGY

## 2022-12-10 RX ORDER — PROMETHAZINE HYDROCHLORIDE 25 MG/1
25 TABLET ORAL EVERY 6 HOURS PRN
Status: DISCONTINUED | OUTPATIENT
Start: 2022-12-10 | End: 2022-12-11 | Stop reason: HOSPADM

## 2022-12-10 RX ORDER — PROMETHAZINE HYDROCHLORIDE 12.5 MG/1
12.5 TABLET ORAL EVERY 6 HOURS PRN
Status: DISCONTINUED | OUTPATIENT
Start: 2022-12-10 | End: 2022-12-10

## 2022-12-10 RX ORDER — ONDANSETRON 4 MG/1
4 TABLET, FILM COATED ORAL EVERY 8 HOURS PRN
Status: DISCONTINUED | OUTPATIENT
Start: 2022-12-10 | End: 2022-12-11 | Stop reason: HOSPADM

## 2022-12-10 RX ORDER — MORPHINE SULFATE 2 MG/ML
6 INJECTION, SOLUTION INTRAMUSCULAR; INTRAVENOUS EVERY 4 HOURS PRN
Status: DISCONTINUED | OUTPATIENT
Start: 2022-12-10 | End: 2022-12-10

## 2022-12-10 RX ORDER — SODIUM CHLORIDE, SODIUM LACTATE, POTASSIUM CHLORIDE, CALCIUM CHLORIDE 600; 310; 30; 20 MG/100ML; MG/100ML; MG/100ML; MG/100ML
125 INJECTION, SOLUTION INTRAVENOUS CONTINUOUS
Status: DISCONTINUED | OUTPATIENT
Start: 2022-12-10 | End: 2022-12-11 | Stop reason: HOSPADM

## 2022-12-10 RX ORDER — OXYTOCIN/0.9 % SODIUM CHLORIDE 30/500 ML
250 PLASTIC BAG, INJECTION (ML) INTRAVENOUS CONTINUOUS
Status: DISPENSED | OUTPATIENT
Start: 2022-12-10 | End: 2022-12-10

## 2022-12-10 RX ORDER — PROMETHAZINE HYDROCHLORIDE 12.5 MG/1
12.5 SUPPOSITORY RECTAL EVERY 6 HOURS PRN
Status: DISCONTINUED | OUTPATIENT
Start: 2022-12-10 | End: 2022-12-10

## 2022-12-10 RX ORDER — SODIUM CHLORIDE 0.9 % (FLUSH) 0.9 %
1-10 SYRINGE (ML) INJECTION AS NEEDED
Status: DISCONTINUED | OUTPATIENT
Start: 2022-12-10 | End: 2022-12-11 | Stop reason: HOSPADM

## 2022-12-10 RX ORDER — METHYLERGONOVINE MALEATE 0.2 MG/ML
200 INJECTION INTRAVENOUS ONCE AS NEEDED
Status: DISCONTINUED | OUTPATIENT
Start: 2022-12-10 | End: 2022-12-10

## 2022-12-10 RX ORDER — SODIUM CHLORIDE 0.9 % (FLUSH) 0.9 %
1-10 SYRINGE (ML) INJECTION AS NEEDED
Status: DISCONTINUED | OUTPATIENT
Start: 2022-12-10 | End: 2022-12-10

## 2022-12-10 RX ORDER — IBUPROFEN 600 MG/1
600 TABLET ORAL EVERY 6 HOURS PRN
Status: DISCONTINUED | OUTPATIENT
Start: 2022-12-10 | End: 2022-12-11 | Stop reason: HOSPADM

## 2022-12-10 RX ORDER — OXYTOCIN/0.9 % SODIUM CHLORIDE 30/500 ML
999 PLASTIC BAG, INJECTION (ML) INTRAVENOUS ONCE
Status: COMPLETED | OUTPATIENT
Start: 2022-12-10 | End: 2022-12-10

## 2022-12-10 RX ORDER — SODIUM CHLORIDE 0.9 % (FLUSH) 0.9 %
10 SYRINGE (ML) INJECTION EVERY 12 HOURS SCHEDULED
Status: DISCONTINUED | OUTPATIENT
Start: 2022-12-10 | End: 2022-12-10

## 2022-12-10 RX ORDER — LIDOCAINE HYDROCHLORIDE 10 MG/ML
5 INJECTION, SOLUTION EPIDURAL; INFILTRATION; INTRACAUDAL; PERINEURAL AS NEEDED
Status: DISCONTINUED | OUTPATIENT
Start: 2022-12-10 | End: 2022-12-10

## 2022-12-10 RX ORDER — ONDANSETRON 4 MG/1
4 TABLET, FILM COATED ORAL ONCE AS NEEDED
Status: DISCONTINUED | OUTPATIENT
Start: 2022-12-10 | End: 2022-12-10

## 2022-12-10 RX ORDER — ONDANSETRON 2 MG/ML
4 INJECTION INTRAMUSCULAR; INTRAVENOUS EVERY 6 HOURS PRN
Status: DISCONTINUED | OUTPATIENT
Start: 2022-12-10 | End: 2022-12-11 | Stop reason: HOSPADM

## 2022-12-10 RX ORDER — PROMETHAZINE HYDROCHLORIDE 12.5 MG/1
12.5 SUPPOSITORY RECTAL EVERY 6 HOURS PRN
Status: DISCONTINUED | OUTPATIENT
Start: 2022-12-10 | End: 2022-12-11 | Stop reason: HOSPADM

## 2022-12-10 RX ORDER — HYDROCORTISONE 25 MG/G
1 CREAM TOPICAL AS NEEDED
Status: DISCONTINUED | OUTPATIENT
Start: 2022-12-10 | End: 2022-12-11 | Stop reason: HOSPADM

## 2022-12-10 RX ORDER — MORPHINE SULFATE 4 MG/ML
4 INJECTION, SOLUTION INTRAMUSCULAR; INTRAVENOUS EVERY 4 HOURS PRN
Status: DISCONTINUED | OUTPATIENT
Start: 2022-12-10 | End: 2022-12-10

## 2022-12-10 RX ORDER — DIPHENHYDRAMINE HCL 25 MG
25 CAPSULE ORAL NIGHTLY PRN
Status: DISCONTINUED | OUTPATIENT
Start: 2022-12-10 | End: 2022-12-11 | Stop reason: HOSPADM

## 2022-12-10 RX ORDER — CARBOPROST TROMETHAMINE 250 UG/ML
250 INJECTION, SOLUTION INTRAMUSCULAR AS NEEDED
Status: DISCONTINUED | OUTPATIENT
Start: 2022-12-10 | End: 2022-12-10

## 2022-12-10 RX ORDER — HYDROCODONE BITARTRATE AND ACETAMINOPHEN 5; 325 MG/1; MG/1
1 TABLET ORAL EVERY 4 HOURS PRN
Status: DISCONTINUED | OUTPATIENT
Start: 2022-12-10 | End: 2022-12-11 | Stop reason: HOSPADM

## 2022-12-10 RX ORDER — ONDANSETRON 4 MG/1
4 TABLET, FILM COATED ORAL EVERY 6 HOURS PRN
Status: DISCONTINUED | OUTPATIENT
Start: 2022-12-10 | End: 2022-12-10

## 2022-12-10 RX ORDER — MORPHINE SULFATE 4 MG/ML
4 INJECTION, SOLUTION INTRAMUSCULAR; INTRAVENOUS ONCE AS NEEDED
Status: DISCONTINUED | OUTPATIENT
Start: 2022-12-10 | End: 2022-12-10

## 2022-12-10 RX ORDER — ONDANSETRON 2 MG/ML
4 INJECTION INTRAMUSCULAR; INTRAVENOUS EVERY 6 HOURS PRN
Status: DISCONTINUED | OUTPATIENT
Start: 2022-12-10 | End: 2022-12-10

## 2022-12-10 RX ORDER — BISACODYL 10 MG
10 SUPPOSITORY, RECTAL RECTAL DAILY PRN
Status: DISCONTINUED | OUTPATIENT
Start: 2022-12-11 | End: 2022-12-11 | Stop reason: HOSPADM

## 2022-12-10 RX ORDER — PRENATAL VIT/IRON FUM/FOLIC AC 27MG-0.8MG
1 TABLET ORAL DAILY
Status: DISCONTINUED | OUTPATIENT
Start: 2022-12-10 | End: 2022-12-11 | Stop reason: HOSPADM

## 2022-12-10 RX ORDER — MISOPROSTOL 200 UG/1
800 TABLET ORAL AS NEEDED
Status: DISCONTINUED | OUTPATIENT
Start: 2022-12-10 | End: 2022-12-10

## 2022-12-10 RX ORDER — ONDANSETRON 2 MG/ML
4 INJECTION INTRAMUSCULAR; INTRAVENOUS ONCE AS NEEDED
Status: DISCONTINUED | OUTPATIENT
Start: 2022-12-10 | End: 2022-12-10

## 2022-12-10 RX ORDER — MORPHINE SULFATE 2 MG/ML
2 INJECTION, SOLUTION INTRAMUSCULAR; INTRAVENOUS ONCE AS NEEDED
Status: DISCONTINUED | OUTPATIENT
Start: 2022-12-10 | End: 2022-12-10

## 2022-12-10 RX ORDER — HYDROCODONE BITARTRATE AND ACETAMINOPHEN 7.5; 325 MG/1; MG/1
1 TABLET ORAL EVERY 4 HOURS PRN
Status: DISCONTINUED | OUTPATIENT
Start: 2022-12-10 | End: 2022-12-11 | Stop reason: HOSPADM

## 2022-12-10 RX ORDER — ACETAMINOPHEN 325 MG/1
650 TABLET ORAL ONCE AS NEEDED
Status: COMPLETED | OUTPATIENT
Start: 2022-12-10 | End: 2022-12-10

## 2022-12-10 RX ORDER — DOCUSATE SODIUM 100 MG/1
100 CAPSULE, LIQUID FILLED ORAL 2 TIMES DAILY PRN
Status: DISCONTINUED | OUTPATIENT
Start: 2022-12-10 | End: 2022-12-11 | Stop reason: HOSPADM

## 2022-12-10 RX ADMIN — SODIUM CHLORIDE, POTASSIUM CHLORIDE, SODIUM LACTATE AND CALCIUM CHLORIDE 125 ML/HR: 600; 310; 30; 20 INJECTION, SOLUTION INTRAVENOUS at 03:43

## 2022-12-10 RX ADMIN — Medication 999 ML/HR: at 04:43

## 2022-12-10 RX ADMIN — ACETAMINOPHEN 650 MG: 325 TABLET, FILM COATED ORAL at 05:28

## 2022-12-10 RX ADMIN — Medication 250 ML/HR: at 03:39

## 2022-12-10 RX ADMIN — IBUPROFEN 600 MG: 600 TABLET ORAL at 11:53

## 2022-12-10 NOTE — PLAN OF CARE
Problem: Adult Inpatient Plan of Care  Goal: Plan of Care Review  Outcome: Ongoing, Progressing  Flowsheets (Taken 12/10/2022 0621)  Outcome Evaluation:  0331 viable female apgars 9 and 9  Goal: Patient-Specific Goal (Individualized)  Outcome: Ongoing, Progressing  Goal: Absence of Hospital-Acquired Illness or Injury  Outcome: Ongoing, Progressing  Goal: Optimal Comfort and Wellbeing  Outcome: Ongoing, Progressing  Goal: Readiness for Transition of Care  Outcome: Ongoing, Progressing   Goal Outcome Evaluation:              Outcome Evaluation:  0331 viable female apgars 9 and 9

## 2022-12-10 NOTE — SIGNIFICANT NOTE
12/10/22 0250   Provider Notification   Reason for Communication Review case   Provider Name Dr Leon notified pt arrived to  with SROM, ve4-5, neg GBS requesting epidural, new orders admit labor epidural when ready   Notification Route Phone call   Response See orders

## 2022-12-10 NOTE — H&P
ANNELISE Vo  Tea Nelson  : 1990  MRN: [id number]  [facility]: [id number]    History and Physical  Tea Nelson is a 32 y.o. year old  with an Estimated Date of Delivery: 12/10/22 currently at 40w0d presenting with complaints of regular contractions every 2 to 3 minutes with spontaneous rupture membranes at 01 30.  Patient with 3 previous vaginal deliveries with history of rapid delivery with last pregnancy.  Patient progressed quickly in labor to 9 cm.  Her prenatal course was unremarkable other than iron deficiency anemia.  GBS was negative.  Her last ultrasound for growth was on  showing the infant at the 69th percentile for growth with adequate fluid.    Prenatal care has been with [facility] Vo.  It has been benign.    History  OB History    Para Term  AB Living   6 3 3 0 2 3   SAB IAB Ectopic Molar Multiple Live Births   1 0 1 0 0 3      # Outcome Date GA Lbr Jung/2nd Weight Sex Delivery Anes PTL Lv   6 Current            5 Term 21 38w2d / 00:20 3799 g (8 lb 6 oz) M Vag-Spont EPI N PANCHO      Name: JO ANN SALDANA      Apgar1: 8  Apgar5: 8   4 Term 09 38w0d  3827 g (8 lb 7 oz) M Vag-Spont  N PANCHO   3 Term 07 40w0d  3685 g (8 lb 2 oz) M Vag-Spont  N PANCHO   2 SAB            1 Ectopic              Past Medical History:   Diagnosis Date   • Patient denies medical problems      No current facility-administered medications on file prior to encounter.     Current Outpatient Medications on File Prior to Encounter   Medication Sig Dispense Refill   • ferrous sulfate 325 (65 FE) MG tablet Take 1 tablet by mouth 2 (Two) Times a Day Before Meals. 60 tablet 6   • hydrOXYzine pamoate (Vistaril) 50 MG capsule Take 1 capsule by mouth At Night As Needed (insomnia). 30 capsule 4   • Prenat w/o X-OC-Wxkucox-FA-DHA (WesCap-PN DHA) 27-0.6-0.4-300 MG capsule Take 1 capsule by mouth Daily.     • PRENATAL GUMMY VITAMIN Chew 1 each Daily.       Allergies   Allergen Reactions   • Corey  [Cefaclor] Rash     Past Surgical History:   Procedure Laterality Date   • LAPAROSCOPIC EXPLORATION FOR ECTOPIC PREGNANCY N/A 12/4/2021    Procedure: LAPAROSCOPIC EXPLORATION FOR ECTOPIC PREGNANCY, RIGHT SALPINGECTOMY;  Surgeon: Deloris Leon MD;  Location: [facility];  Service: Obstetrics/Gynecology;  Laterality: N/A;   • NO PAST SURGERIES     • TONSILLECTOMY AND ADENOIDECTOMY       History reviewed. No pertinent family history.  Social History     Socioeconomic History   • Marital status:      Spouse name: rosangela   • Number of children: 3   • Highest education level: Bachelor's degree (e.g., BA, AB, BS)   Tobacco Use   • Smoking status: Never   • Smokeless tobacco: Never   Vaping Use   • Vaping Use: Never used   Substance and Sexual Activity   • Alcohol use: No   • Drug use: No   • Sexual activity: Yes     Partners: Male     Birth control/protection: None     Review of Systems  The following systems were reviewed and negative:  constitution, eyes, ENT, respiratory, cardiovascular, gastrointestinal, genitourinary, integument, breast, hematologic / lymphatic, musculoskeletal, neurological, behavioral/psych, endocrine and allergies / immunologic    Objective  Vitals:    12/10/22 0225   BP: 132/80   BP Location: Left arm   Patient Position: Lying   Pulse: 100   Resp: 18   Temp: 98.7 °F (37.1 °C)   TempSrc: Oral     Physical Exam:  General Appearance:  Alert and cooperative, not in any acute distress.   Head:  Atraumatic and normocephalic, without obvious abnormality.   Eyes:          PERRLA, conjunctivae and sclerae normal, no Icterus. No pallor. Extraocular movements are within normal limits.   Ears:  Ears appear intact with no abnormalities noted.   Throat: No oral lesions, no thrush, oral mucosa moist.   Neck: Supple, trachea midline, no thyromegaly, no carotid bruit.   Back:   No kyphoscoliosis present. No tenderness to palpation,   range of motion normal.  No CVAT.   Lungs:   Chest shape is normal. Breath  sounds heard bilaterally equally.  No crackles or wheezing. No Pleural rub or bronchial breathing. Normal respiratory effort.   Heart:  Normal S1 and S2, no murmur, no gallop, no rub. No JVD.   Abdomen:   Normal bowel sounds, no masses, no hepatomegaly, no splenomegaly. Soft, non-tender, no guarding, no rebound tenderness.  Gravid uterus.   Extremities: Moves all extremities well, no edema, no cyanosis, no clubbing.  Normal range of motion. Normal strength and tone.   Skin: No bleeding, bruising or rash. No palpable masses noted or induration.   Psychiatric: Alert and oriented  to time, place, and person. Appropriate mood and affect. Thought content organized and appropriate judgment.       FHT's: reassuring and category 1   Contractions: regular   Cervix: was checked (by me): 9 cm / 100 % / 0   Presentation: cephalic       Prenatal Labs  Lab Results   Component Value Date    HGB 12.2 12/10/2022    HEPBSAG Negative 04/26/2022    ABORH O Positive 11/24/2021    ABSCRN Negative 12/10/2022    [id number] Non Reactive 04/26/2022    HEPCVIRUSABY <0.1 04/26/2022    STREPGPB Negative 11/14/2022    URINECX No growth 12/03/2021       Current Labs Reviewed   I reviewed the patient's new clinical results.     Lab Results (last 24 hours)     Procedure Component Value Units Date/Time    COVID PRE-OP / PRE-PROCEDURE SCREENING ORDER (NO ISOLATION) - Swab, Nasal Cavity [[id number]]  (Normal) Collected: 12/10/22 0244    Specimen: Swab from Nasal Cavity Updated: 12/10/22 0339    Narrative:      The following orders were created for panel order COVID PRE-OP / PRE-PROCEDURE SCREENING ORDER (NO ISOLATION) - Swab, Nasal Cavity.  Procedure                               Abnormality         Status                     ---------                               -----------         ------                     COVID-19,[facility] IN-LEE...[[id number]]  Normal              Final result                 Please view results for these tests on the individual  orders.    COVID-19,[facility] IN-HOUSE,Nasal Swab No Transport Media 3-4 HR TAT - Swab, Nasal Cavity [[id number]]  (Normal) Collected: 12/10/22 0244    Specimen: Swab from Nasal Cavity Updated: 12/10/22 0339     COVID19 Not Detected    Narrative:      Fact sheet for providers: https://www.fda.gov/media/179157/download     Fact sheet for patients: https://www.fda.gov/media/254799/download    Test performed by PCR.    Consider negative results in combination with clinical observations, patient history, and epidemiological information.    CBC & Differential [[id number]]  (Abnormal) Collected: 12/10/22 0254    Specimen: Blood Updated: 12/10/22 0309    Narrative:      The following orders were created for panel order CBC & Differential.  Procedure                               Abnormality         Status                     ---------                               -----------         ------                     CBC Auto Differential[[id number]]        Abnormal            Final result                 Please view results for these tests on the individual orders.    CBC Auto Differential [[id number]]  (Abnormal) Collected: 12/10/22 0254    Specimen: Blood Updated: 12/10/22 0309     WBC 9.84 10*3/mm3      RBC 3.95 10*6/mm3      Hemoglobin 12.2 g/dL      Hematocrit 36.0 %      MCV 91.1 fL      MCH 30.9 pg      MCHC 33.9 g/dL      RDW 12.2 %      RDW-SD 40.6 fl      MPV 11.7 fL      Platelets 226 10*3/mm3      Neutrophil % 65.4 %      Lymphocyte % 24.8 %      Monocyte % 6.0 %      Eosinophil % 2.8 %      Basophil % 0.7 %      Immature Grans % 0.3 %      Neutrophils, Absolute 6.43 10*3/mm3      Lymphocytes, Absolute 2.44 10*3/mm3      Monocytes, Absolute 0.59 10*3/mm3      Eosinophils, Absolute 0.28 10*3/mm3      Basophils, Absolute 0.07 10*3/mm3      Immature Grans, Absolute 0.03 10*3/mm3      nRBC 0.0 /100 WBC              Assessment   1. IUP at 40w0d  2. Group B strep status: negative  3. Active labor with spontaneous rupture of  membranes  4. Rapid progression in labor     Plan   1. Expectant management   2. Anticipate  soon    Deloris Leon M.D.  12/10/2022  03:51 EST

## 2022-12-10 NOTE — L&D DELIVERY NOTE
[facility]  Vaginal Delivery Note    Delivery details     Delivery: Vaginal, Spontaneous     YOB: 2024    Time of Birth: 3:31 AM      Anesthesia: None     Delivering clinician: Deloris Leon    Forceps?   No   Vacuum? No    Shoulder dystocia present: No      Delivery narrative:  The patient progressed to complete and pushing.  With good maternal effort she delivered a viable female infant.  The head presented in the LESLIE presentation.  There was no nuchal cord present.  The anterior fetal shoulder was then easily delivered with a gentle downward motion followed by the posterior fetal shoulder, followed by the remainder of the infant without difficulty.  The infant was immediately vigorous.  The oropharynx and nares were bulb-suction.  The infant was placed on the maternal abdomen and the cord was clamped roughly 45 seconds following delivery.  Cord blood was then collected.  The placenta then delivered spontaneously intact, and a three vessel cord was noted.  Uterine massage and Pitocin 20 units IV was given until the fundus was firm.  The cervix, vagina, perineum, and rectum were carefully inspected for lacerations.  A first degree right periurethral laceration was noted with no active bleeding.   Counts for needles, sponges, laps and instruments were correct at the end of the delivery.  There were no major complications.  Mother and baby were stable at the end of the delivery.    Infant details    Findings: Viable female infant     Infant observations: Weight: 8 lbs 13.9 ounces   Apgars: See  record         Placenta, Cord, and Fluid    Placenta delivered  Spontaneous intact       Nuchal Cord?  no   Cord blood obtained: Yes      Repair    Episiotomy: None    Lacerations: Yes  Laceration Information  Laceration Repaired?   Perineal: None      Periurethral:    right first degree   Labial:       Sulcus:       Vaginal:       Cervical:            Estimated Blood Loss: Est. Blood Loss (mL): 50 mL  (Filed from Delivery Summary) (12/10/22 3126)       Complications  none    Disposition  Mother to Remain in LD  in stable condition currently.  Baby to remains with mom  in stable condition currently.      Deloris Leon MD  12/10/22  04:10 EST

## 2022-12-10 NOTE — NON STRESS TEST
Tea Nelson, a  at 40w0d with an LIBAN of 12/10/2022, by Last Menstrual Period, was seen at [facility] for a nonstress test.    No chief complaint on file.      Patient Active Problem List   Diagnosis   • Right tubal pregnancy without intrauterine pregnancy   • Ectopic pregnancy   • Short interval between pregnancies affecting pregnancy, antepartum   • Pregnancy       Start Time: 0230  Stop Time: 0300    Interpretation A  Nonstress Test Interpretation A: Reactive           Pt arrived to  with SROM,ve 4-5, EFM WNL, moderate variability, irregular contractions, palpate strong,  GBS negative, Dr Leon notified by phone, admit orders recieved

## 2022-12-11 VITALS
OXYGEN SATURATION: 95 % | HEIGHT: 64 IN | DIASTOLIC BLOOD PRESSURE: 77 MMHG | TEMPERATURE: 97.6 F | RESPIRATION RATE: 18 BRPM | WEIGHT: 212 LBS | HEART RATE: 83 BPM | SYSTOLIC BLOOD PRESSURE: 133 MMHG | BODY MASS INDEX: 36.19 KG/M2

## 2022-12-11 LAB
BASOPHILS # BLD AUTO: 0.06 10*3/MM3 (ref 0–0.2)
BASOPHILS NFR BLD AUTO: 0.6 % (ref 0–1.5)
DEPRECATED RDW RBC AUTO: 41.2 FL (ref 37–54)
EOSINOPHIL # BLD AUTO: 0.28 10*3/MM3 (ref 0–0.4)
EOSINOPHIL NFR BLD AUTO: 3 % (ref 0.3–6.2)
ERYTHROCYTE [DISTWIDTH] IN BLOOD BY AUTOMATED COUNT: 12.1 % (ref 12.3–15.4)
HCT VFR BLD AUTO: 31.2 % (ref 34–46.6)
HGB BLD-MCNC: 10.4 G/DL (ref 12–15.9)
IMM GRANULOCYTES # BLD AUTO: 0.05 10*3/MM3 (ref 0–0.05)
IMM GRANULOCYTES NFR BLD AUTO: 0.5 % (ref 0–0.5)
LYMPHOCYTES # BLD AUTO: 2.83 10*3/MM3 (ref 0.7–3.1)
LYMPHOCYTES NFR BLD AUTO: 30.1 % (ref 19.6–45.3)
MCH RBC QN AUTO: 30.9 PG (ref 26.6–33)
MCHC RBC AUTO-ENTMCNC: 33.3 G/DL (ref 31.5–35.7)
MCV RBC AUTO: 92.6 FL (ref 79–97)
MONOCYTES # BLD AUTO: 0.44 10*3/MM3 (ref 0.1–0.9)
MONOCYTES NFR BLD AUTO: 4.7 % (ref 5–12)
NEUTROPHILS NFR BLD AUTO: 5.73 10*3/MM3 (ref 1.7–7)
NEUTROPHILS NFR BLD AUTO: 61.1 % (ref 42.7–76)
NRBC BLD AUTO-RTO: 0 /100 WBC (ref 0–0.2)
PLATELET # BLD AUTO: 199 10*3/MM3 (ref 140–450)
PMV BLD AUTO: 11 FL (ref 6–12)
RBC # BLD AUTO: 3.37 10*6/MM3 (ref 3.77–5.28)
WBC NRBC COR # BLD: 9.39 10*3/MM3 (ref 3.4–10.8)

## 2022-12-11 PROCEDURE — 0503F POSTPARTUM CARE VISIT: CPT | Performed by: OBSTETRICS & GYNECOLOGY

## 2022-12-11 PROCEDURE — 85025 COMPLETE CBC W/AUTO DIFF WBC: CPT | Performed by: OBSTETRICS & GYNECOLOGY

## 2022-12-11 RX ORDER — HYDROCODONE BITARTRATE AND ACETAMINOPHEN 5; 325 MG/1; MG/1
1 TABLET ORAL EVERY 6 HOURS PRN
Qty: 12 TABLET | Refills: 0 | Status: SHIPPED | OUTPATIENT
Start: 2022-12-11 | End: 2023-01-23

## 2022-12-11 RX ORDER — IBUPROFEN 600 MG/1
600 TABLET ORAL EVERY 6 HOURS PRN
Qty: 60 TABLET | Refills: 0 | Status: SHIPPED | OUTPATIENT
Start: 2022-12-11 | End: 2023-01-23

## 2022-12-11 RX ORDER — DOCUSATE SODIUM 100 MG/1
100 CAPSULE, LIQUID FILLED ORAL 2 TIMES DAILY
Qty: 60 CAPSULE | Refills: 0 | Status: SHIPPED | OUTPATIENT
Start: 2022-12-11 | End: 2023-01-23

## 2022-12-11 RX ADMIN — DOCUSATE SODIUM 100 MG: 100 CAPSULE, LIQUID FILLED ORAL at 08:37

## 2022-12-11 RX ADMIN — IBUPROFEN 600 MG: 600 TABLET ORAL at 00:46

## 2022-12-11 RX ADMIN — IBUPROFEN 600 MG: 600 TABLET ORAL at 10:27

## 2022-12-11 RX ADMIN — PRENATAL VITAMINS-IRON FUMARATE 27 MG IRON-FOLIC ACID 0.8 MG TABLET 1 TABLET: at 08:36

## 2022-12-11 RX ADMIN — Medication 1 APPLICATION: at 10:24

## 2022-12-11 NOTE — PLAN OF CARE
Goal Outcome Evaluation:  Plan of Care Reviewed With: patient        Progress: improving  Outcome Evaluation: VSS. Tolerating diet without N/V. Ambulating without difficulty. FF with scant dark red bleeding noted with no clots visible @ this time. + boding with infant. Will continue with routine PP care.

## 2022-12-11 NOTE — PLAN OF CARE
Goal Outcome Evaluation:           Progress: improving  Outcome Evaluation: VSS, pain controlled well with PO pain meds. fundal and lochia WNL

## 2022-12-11 NOTE — DISCHARGE SUMMARY
Discharge Summary     Tavo Nelson  : 1990  MRN: [id number]  CSN: [id number]    Date of Admission: 12/10/2022   Date of Discharge:  2022   Delivering Physician: Deloris Leon        Admission Diagnosis: 1. Pregnancy [Z34.90]   Discharge Diagnosis: 1. Same as above plus  2. Pregnancy at 40w0d - delivered  3. Anemia       Procedures: 12/10/2022  - Vaginal, Spontaneous       Hospital Course  Patient is a 32 y.o.  who at 40w0d had a vaginal birth.  Her postpartum course was without complications.  On PPD #1 she was ready for discharge.  She had normal lochia and pain was well controlled with oral medications.  Patient reports she was having scant bleeding as well as pain.  Patient was requesting to be discharged home.  Discharge instructions and precautions were given.    Vitals  Min/max vitals past 24 hours:   Temp  Min: 97.5 °F (36.4 °C)  Max: 98.2 °F (36.8 °C)  BP  Min: 119/67  Max: 138/89  Pulse  Min: 67  Max: 83  Resp  Min: 16  Max: 18    Fluid balance past 24 hours:  I/O last 3 completed shifts:  In: -   Out: 950 [Urine:900; Blood:50]           Physical Exam  General Appearance: well developed, well nourished, not in any acute distress   Respiratory: breathing is unlabored, clear to auscultation bilaterally   CV: regular rate and rhythm, S1, S2 normal, no murmur, click, rub or gallop   Abdomen: soft, non-tender, no palpable masses; fundus firm and non-tender   Pelvic: not performed   Extremities: moves extremities well; normal appearance with no cyanosis or edema and no calf tenderness     Infant  female  fetus weighing 4024 g (8 lb 13.9 oz)   Apgars -  9 @ 1 minute /  9 @ 5 minutes.    Discharge labs  Lab Results   Component Value Date    WBC 9.39 2022    HGB 10.4 (L) 2022    HCT 31.2 (L) 2022     2022       Discharge Medications     Discharge Medications      New Medications      Instructions Start Date   docusate sodium 100 MG capsule  Commonly  known as: Colace   100 mg, Oral, 2 Times Daily      HYDROcodone-acetaminophen 5-325 MG per tablet  Commonly known as: NORCO   1 tablet, Oral, Every 6 Hours PRN      ibuprofen 600 MG tablet  Commonly known as: ADVIL,MOTRIN   600 mg, Oral, Every 6 Hours PRN         Continue These Medications      Instructions Start Date   ferrous sulfate 325 (65 FE) MG tablet   325 mg, Oral, 2 Times Daily Before Meals      hydrOXYzine pamoate 50 MG capsule  Commonly known as: Vistaril   50 mg, Oral, Nightly PRN      WesCap-PN DHA 27-0.6-0.4-300 MG capsule   1 capsule, Oral, Daily         Stop These Medications    PRENATAL GUMMY VITAMIN            Discharge Disposition: Home   Condition on Discharge: Stable   Follow-up: 6 weeks with [facility]   Time spent: 20  minutes  Deloris Leon M.D.  12/11/2022

## 2023-01-23 ENCOUNTER — POSTPARTUM VISIT (OUTPATIENT)
Dept: OBSTETRICS AND GYNECOLOGY | Facility: CLINIC | Age: 33
End: 2023-01-23
Payer: COMMERCIAL

## 2023-01-23 VITALS
DIASTOLIC BLOOD PRESSURE: 80 MMHG | HEIGHT: 64 IN | WEIGHT: 183 LBS | SYSTOLIC BLOOD PRESSURE: 124 MMHG | BODY MASS INDEX: 31.24 KG/M2

## 2023-01-23 DIAGNOSIS — Z30.011 ENCOUNTER FOR INITIAL PRESCRIPTION OF CONTRACEPTIVE PILLS: Primary | ICD-10-CM

## 2023-01-23 PROBLEM — O00.101 RIGHT TUBAL PREGNANCY WITHOUT INTRAUTERINE PREGNANCY: Status: RESOLVED | Noted: 2021-11-29 | Resolved: 2023-01-23

## 2023-01-23 PROBLEM — O00.90 ECTOPIC PREGNANCY: Status: RESOLVED | Noted: 2021-12-04 | Resolved: 2023-01-23

## 2023-01-23 PROBLEM — Z34.90 PREGNANCY: Status: RESOLVED | Noted: 2022-12-10 | Resolved: 2023-01-23

## 2023-01-23 PROBLEM — O09.899 SHORT INTERVAL BETWEEN PREGNANCIES AFFECTING PREGNANCY, ANTEPARTUM: Status: RESOLVED | Noted: 2022-05-02 | Resolved: 2023-01-23

## 2023-01-23 PROCEDURE — 0503F POSTPARTUM CARE VISIT: CPT | Performed by: MIDWIFE

## 2023-01-23 RX ORDER — NORETHINDRONE ACETATE AND ETHINYL ESTRADIOL 1; .02 MG/1; MG/1
1 TABLET ORAL DAILY
Qty: 28 TABLET | Refills: 6 | Status: SHIPPED | OUTPATIENT
Start: 2023-01-23 | End: 2024-01-22

## 2023-01-23 NOTE — PROGRESS NOTES
"History  Chief Complaint   Patient presents with   • Routine Prenatal Visit     No Complaints/concerns         Tea Nelson is a 32 y.o. year old  presenting to be seen for her postpartum visit.  She had a vaginal delivery.    Since delivery she has been sexually active. She has not used condoms.  She does not have concerns about post-partum blues/depression.   She is bottle feeding.  For ongoing contraception, her plans are OCP's (estrogen/progesterone).  She has not had a menstrual cycle. She has been spotting some         Physical  /80   Ht 162.6 cm (64\")   Wt 83 kg (183 lb)   LMP 2022   Breastfeeding No   BMI 31.41 kg/m²     General:  well developed; well nourished  no acute distress   Abdomen: soft, non-tender; no masses   Pelvis: External genitalia:  normal appearance of the external genitalia including [place]th[place]'s and [place]'s glands.  Uterus:  normal size, shape and consistency. retroverted;  Adnexa:  normal bimanual exam of the adnexa.        Assessment   1. Normal 6 week postpartum exam  2. Contraceptive management     Plan   1. BC options reviewed and compared today: OCP (estrogen/progesterone) She was instructed how to start her birth control.  It should be started on  following the onset of her next cycle.  The patient was educated regarding the correct and consistent use.  Because it may take 1 month to become effective, the use of alternative contraception for one month was stressed.  The potential for breakthrough bleeding for up to 4 cycles was also emphasized.    2. Pap smear not due  3. Follow up July for annual exam    New Medications Ordered This Visit   Medications   • norethindrone-ethinyl estradiol (MICROGESTIN ) 1-20 MG-MCG per tablet     Sig: Take 1 tablet by mouth Daily for 364 days.     Dispense:  28 tablet     Refill:  6          This note was electronically signed.  Georgia Ortiz, APRN  2023  "

## 2023-10-07 ENCOUNTER — HOSPITAL ENCOUNTER (EMERGENCY)
Facility: HOSPITAL | Age: 33
Discharge: HOME | End: 2023-10-07
Attending: STUDENT IN AN ORGANIZED HEALTH CARE EDUCATION/TRAINING PROGRAM
Payer: COMMERCIAL

## 2023-10-07 VITALS
HEIGHT: 64 IN | HEART RATE: 80 BPM | TEMPERATURE: 98.1 F | SYSTOLIC BLOOD PRESSURE: 94 MMHG | BODY MASS INDEX: 35.41 KG/M2 | OXYGEN SATURATION: 98 % | DIASTOLIC BLOOD PRESSURE: 82 MMHG | WEIGHT: 207.4 LBS | RESPIRATION RATE: 17 BRPM

## 2023-10-07 DIAGNOSIS — Z32.01 POSITIVE BLOOD PREGNANCY TEST: Primary | ICD-10-CM

## 2023-10-07 LAB
B-HCG UR QL: NEGATIVE
BASOPHILS # BLD AUTO: 0.07 10*3/MM3 (ref 0–0.2)
BASOPHILS NFR BLD AUTO: 0.9 % (ref 0–1.5)
BILIRUB UR QL STRIP: NEGATIVE
CLARITY UR: CLEAR
COLOR UR: YELLOW
DEPRECATED RDW RBC AUTO: 38.6 FL (ref 37–54)
EOSINOPHIL # BLD AUTO: 1 10*3/MM3 (ref 0–0.4)
EOSINOPHIL NFR BLD AUTO: 12.4 % (ref 0.3–6.2)
ERYTHROCYTE [DISTWIDTH] IN BLOOD BY AUTOMATED COUNT: 11.8 % (ref 12.3–15.4)
GLUCOSE UR STRIP-MCNC: NEGATIVE MG/DL
HCG INTACT+B SERPL-ACNC: 10.83 MIU/ML
HCT VFR BLD AUTO: 36.4 % (ref 34–46.6)
HGB BLD-MCNC: 12.2 G/DL (ref 12–15.9)
HGB UR QL STRIP.AUTO: NEGATIVE
IMM GRANULOCYTES # BLD AUTO: 0.02 10*3/MM3 (ref 0–0.05)
IMM GRANULOCYTES NFR BLD AUTO: 0.2 % (ref 0–0.5)
KETONES UR QL STRIP: NEGATIVE
LEUKOCYTE ESTERASE UR QL STRIP.AUTO: NEGATIVE
LYMPHOCYTES # BLD AUTO: 2.7 10*3/MM3 (ref 0.7–3.1)
LYMPHOCYTES NFR BLD AUTO: 33.6 % (ref 19.6–45.3)
MCH RBC QN AUTO: 30.1 PG (ref 26.6–33)
MCHC RBC AUTO-ENTMCNC: 33.5 G/DL (ref 31.5–35.7)
MCV RBC AUTO: 89.9 FL (ref 79–97)
MONOCYTES # BLD AUTO: 0.42 10*3/MM3 (ref 0.1–0.9)
MONOCYTES NFR BLD AUTO: 5.2 % (ref 5–12)
NEUTROPHILS NFR BLD AUTO: 3.83 10*3/MM3 (ref 1.7–7)
NEUTROPHILS NFR BLD AUTO: 47.7 % (ref 42.7–76)
NITRITE UR QL STRIP: NEGATIVE
NRBC BLD AUTO-RTO: 0 /100 WBC (ref 0–0.2)
PH UR STRIP.AUTO: 6.5 [PH] (ref 5–8)
PLATELET # BLD AUTO: 316 10*3/MM3 (ref 140–450)
PMV BLD AUTO: 9.1 FL (ref 6–12)
PROT UR QL STRIP: NEGATIVE
RBC # BLD AUTO: 4.05 10*6/MM3 (ref 3.77–5.28)
SP GR UR STRIP: 1.02 (ref 1–1.03)
UROBILINOGEN UR QL STRIP: NORMAL
WBC NRBC COR # BLD: 8.04 10*3/MM3 (ref 3.4–10.8)

## 2023-10-07 PROCEDURE — 81025 URINE PREGNANCY TEST: CPT | Performed by: PHYSICIAN ASSISTANT

## 2023-10-07 PROCEDURE — 81003 URINALYSIS AUTO W/O SCOPE: CPT | Performed by: PHYSICIAN ASSISTANT

## 2023-10-07 PROCEDURE — 84702 CHORIONIC GONADOTROPIN TEST: CPT | Performed by: PHYSICIAN ASSISTANT

## 2023-10-07 PROCEDURE — 85025 COMPLETE CBC W/AUTO DIFF WBC: CPT | Performed by: PHYSICIAN ASSISTANT

## 2023-10-07 PROCEDURE — 99282 EMERGENCY DEPT VISIT SF MDM: CPT

## 2023-10-07 PROCEDURE — 36415 COLL VENOUS BLD VENIPUNCTURE: CPT

## 2023-10-07 NOTE — ED PROVIDER NOTES
Subjective  History of Present Illness:    Chief Complaint: Possible Pregnancy  History of Present Illness: Patient is a 33-year-old  female with history of ectopic pregnancy presenting to the ER for evaluation of possible pregnancy.  Patient reports that her last menstrual cycle was sometime around 2023.  She states that she has had pregnancy test at home that have been positive but some have been negative.  She states that she contacted her OB/GYN but cannot get in for the next 2 weeks and so they told her she could come to the ER and get a test to confirm.  She denies any fever, chills, abdominal pain, vaginal bleeding, dysuria, hematuria, or any other symptoms.  Onset: Few days  Duration: Persistent  Exacerbating / Alleviating factors: None  Associated symptoms: None      Nurses Notes reviewed and agree, including vitals, allergies, social history and prior medical history.     REVIEW OF SYSTEMS: All systems reviewed and not pertinent unless noted.  Review of Systems      Positive for: Patient has no symptoms    Negative for: Fever, chills, nausea, emesis, dizziness, abdominal pain, dysuria, hematuria    Past Medical History:   Diagnosis Date    Patient denies medical problems        Allergies:    Ceclor [cefaclor]      Past Surgical History:   Procedure Laterality Date    LAPAROSCOPIC EXPLORATION FOR ECTOPIC PREGNANCY N/A 2021    Procedure: LAPAROSCOPIC EXPLORATION FOR ECTOPIC PREGNANCY, RIGHT SALPINGECTOMY;  Surgeon: Deloris Leon MD;  Location: [facility];  Service: Obstetrics/Gynecology;  Laterality: N/A;    NO PAST SURGERIES      TONSILLECTOMY AND ADENOIDECTOMY           Social History     Socioeconomic History    Marital status:      Spouse name: rosangela    Number of children: 3    Highest education level: Bachelor's degree (e.g., BA, AB, BS)   Tobacco Use    Smoking status: Never    Smokeless tobacco: Never   Vaping Use    Vaping Use: Never used   Substance and Sexual Activity     "Alcohol use: No    Drug use: No    Sexual activity: Yes     Partners: Male     Birth control/protection: None         History reviewed. No pertinent family history.    Objective  Physical Exam:  BP 94/82 (BP Location: Left arm, Patient Position: Sitting)   Pulse 80   Temp 98.1 °F (36.7 °C) (Oral)   Resp 17   Ht 162.6 cm (64\")   Wt 94.1 kg (207 lb 6.4 oz)   LMP  (LMP Unknown)   SpO2 98%   BMI 35.60 kg/m²      Physical Exam    CONSTITUTIONAL: Well developed, no acute distress, nontoxic in appearance  VITAL SIGNS: per nursing, reviewed and noted  SKIN: exposed skin with no rashes, ulcerations or petechiae  EYES: Grossly EOMI, no icterus, PERRL  ENT: Normal voice. Nares patent, no facial symmetry   RESPIRATORY:  No increased work of breathing. No retractions.   CARDIOVASCULAR:  regular rate and rhythm  GI: Abdomen soft, nontender to palpation   MUSCULOSKELETAL:  No tenderness. Full ROM. Strength and tone grossly normal.  no spasms.   NEUROLOGIC: Alert, oriented x 3. No gross deficits. GCS 15.   PSYCH: appropriate affect.      Procedures    ED Course:        ED Course as of 10/07/23 1309   Sat Oct 07, 2023   1138 WBC: 8.04 [LR]   1138 Hemoglobin: 12.2 [LR]   1223 HCG, Urine QL: Negative [LR]   1223 Color, UA: Yellow [LR]   1223 Appearance, UA: Clear [LR]   1223 Specific Gravity, UA: 1.021 [LR]   1223 Glucose: Negative [LR]   1223 Ketones, UA: Negative [LR]   1223 Bilirubin, UA: Negative [LR]   1223 Blood, UA: Negative [LR]   1223 Protein, UA: Negative [LR]   1223 Leukocytes, UA: Negative [LR]   1223 Nitrite, UA: Negative [LR]   1223 Urobilinogen, UA: 1.0 E.U./dL [LR]   1244 HCG Quantitative: 10.83 [LR]   1249 Discussed with patient, will follow up with OBGYN [LR]      ED Course User Index  [LR] Shwetha Roberts PA-C       Lab Results (last 24 hours)       Procedure Component Value Units Date/Time    CBC & Differential [[id number]]  (Abnormal) Collected: 10/07/23 1131    Specimen: Blood Updated: 10/07/23 1135    " Narrative:      The following orders were created for panel order CBC & Differential.  Procedure                               Abnormality         Status                     ---------                               -----------         ------                     CBC Auto Differential[[id number]]        Abnormal            Final result                 Please view results for these tests on the individual orders.    hCG, Quantitative, Pregnancy [[id number]] Collected: 10/07/23 1131    Specimen: Blood Updated: 10/07/23 1240     HCG Quantitative 10.83 mIU/mL     Narrative:      HCG Ranges by Gestational Age    Females - non-pregnant premenopausal   </= 1mIU/mL HCG  Females - postmenopausal               </= 7mIU/mL HCG    3 Weeks         5.8 -    71.2 mIU/mL  4 Weeks         9.5 -     750 mIU/mL  5 Weeks         217 -   7,138 mIU/mL  6 Weeks         158 -  31,795 mIU/mL  7 Weeks       3,697 - 163,563 mIU/mL  8 Weeks      32,065 - 149,571 mIU/mL  9 Weeks      63,803 - 151,410 mIU/mL  10 Weeks     46,509 - 186,977 mIU/mL  12 Weeks     27,832 - 210,612 mIU/mL  14 Weeks     13,950 -  62,530 mIU/mL  15 Weeks     12,039 -  70,971 mIU/mL  16 Weeks      9,040 -  56,451 mIU/mL  17 Weeks      8,175 -  55,868 mIU/mL  18 Weeks      8,099 -  58,176 mIU/mL    CBC Auto Differential [[id number]]  (Abnormal) Collected: 10/07/23 1131    Specimen: Blood Updated: 10/07/23 1135     WBC 8.04 10*3/mm3      RBC 4.05 10*6/mm3      Hemoglobin 12.2 g/dL      Hematocrit 36.4 %      MCV 89.9 fL      MCH 30.1 pg      MCHC 33.5 g/dL      RDW 11.8 %      RDW-SD 38.6 fl      MPV 9.1 fL      Platelets 316 10*3/mm3      Neutrophil % 47.7 %      Lymphocyte % 33.6 %      Monocyte % 5.2 %      Eosinophil % 12.4 %      Basophil % 0.9 %      Immature Grans % 0.2 %      Neutrophils, Absolute 3.83 10*3/mm3      Lymphocytes, Absolute 2.70 10*3/mm3      Monocytes, Absolute 0.42 10*3/mm3      Eosinophils, Absolute 1.00 10*3/mm3      Basophils, Absolute 0.07 10*3/mm3       Immature Grans, Absolute 0.02 10*3/mm3      nRBC 0.0 /100 WBC     Pregnancy, Urine - Urine, Clean Catch [[id number]]  (Normal) Collected: 10/07/23 1213    Specimen: Urine, Clean Catch Updated: 10/07/23 1221     HCG, Urine QL Negative    Urinalysis With Microscopic If Indicated (No Culture) - Urine, Clean Catch [[id number]]  (Normal) Collected: 10/07/23 1213    Specimen: Urine, Clean Catch Updated: 10/07/23 1219     Color, UA Yellow     Appearance, UA Clear     pH, UA 6.5     Specific Gravity, UA 1.021     Glucose, UA Negative     Ketones, UA Negative     Bilirubin, UA Negative     Blood, UA Negative     Protein, UA Negative     Leuk Esterase, UA Negative     Nitrite, UA Negative     Urobilinogen, UA 1.0 E.U./dL    Narrative:      Urine microscopic not indicated.             No radiology results from the last 24 hrs       MDM     Amount and/or Complexity of Data Reviewed  Clinical lab tests: reviewed        Initial impression of presenting illness: Patient is a 33-year-old  female presenting to the ER for evaluation of possible pregnancy    DDX: includes but is not limited to: Early pregnancy, intrauterine pregnancy, ectopic pregnancy, and other concerns.  Based on last menstrual period, patient will be very early in pregnancy.     Patient arrives in overall stable condition with vitals interpreted by myself.     Pertinent features from physical exam: Nontender abdomen, stable vital signs.    Initial diagnostic plan: Will obtain hCG level of the blood as well as CBC, urinalysis, and UPT.  I have low concern for ectopic pregnancy given there is been no bleeding, pain or tenderness on exam.    Results from initial plan were reviewed and interpreted by me revealing stable work-up.  UPT was negative, urine had no signs of infection.  CBC was stable.  hCG level was positive at 10.82.    Diagnostic information from other sources: Chart review    Interventions / Re-evaluation: Patient remained stable throughout  visit    Results/clinical rationale were discussed with patient.  Discussed that the urine pregnancy test was negative but there was a faint positive on the hCG blood level.  Discussed that she is likely very early in pregnancy and will need very close follow-up with OB/GYN with repeat testing.  Given the hCG level is very low and there is no abdominal pain or bleeding, do not believe emergent ultrasound is warranted today.    Consultations/Discussion of results with other physicians: None    Disposition plan: Discharge  -----    Final diagnoses:   Positive blood pregnancy test          Shwetha Roberts PA-C  10/07/23 6921

## 2023-10-07 NOTE — DISCHARGE INSTRUCTIONS
You HCG level was 10.83 today which is likely indicative of very early pregnancy.  You will need to continue follow-up with her OB/GYN, will likely need repeat blood tests.  Return to the ER for any change, worsening symptoms, or any additional concerns including limited to severe abdominal pain, heavy vaginal bleeding, dizziness or syncope.

## 2023-11-04 ENCOUNTER — HOSPITAL ENCOUNTER (EMERGENCY)
Facility: HOSPITAL | Age: 33
Discharge: HOME | End: 2023-11-04
Attending: EMERGENCY MEDICINE
Payer: COMMERCIAL

## 2023-11-04 ENCOUNTER — APPOINTMENT (OUTPATIENT)
Dept: ULTRASOUND IMAGING | Facility: HOSPITAL | Age: 33
End: 2023-11-04
Payer: COMMERCIAL

## 2023-11-04 VITALS
BODY MASS INDEX: 34.66 KG/M2 | OXYGEN SATURATION: 98 % | WEIGHT: 208 LBS | RESPIRATION RATE: 16 BRPM | SYSTOLIC BLOOD PRESSURE: 132 MMHG | HEIGHT: 65 IN | DIASTOLIC BLOOD PRESSURE: 86 MMHG | TEMPERATURE: 98 F | HEART RATE: 88 BPM

## 2023-11-04 DIAGNOSIS — O20.9 VAGINAL BLEEDING AFFECTING EARLY PREGNANCY: Primary | ICD-10-CM

## 2023-11-04 LAB — HCG INTACT+B SERPL-ACNC: NORMAL MIU/ML

## 2023-11-04 PROCEDURE — 76817 TRANSVAGINAL US OBSTETRIC: CPT

## 2023-11-04 PROCEDURE — 84702 CHORIONIC GONADOTROPIN TEST: CPT | Performed by: EMERGENCY MEDICINE

## 2023-11-04 PROCEDURE — 36415 COLL VENOUS BLD VENIPUNCTURE: CPT

## 2023-11-04 PROCEDURE — 99284 EMERGENCY DEPT VISIT MOD MDM: CPT

## 2023-11-04 NOTE — DISCHARGE INSTRUCTIONS
I understand he had to leave before the results returned.  We will contact you with results regarding your ultrasound

## 2023-11-04 NOTE — ED PROVIDER NOTES
Some [facility] you got it thank you this is  Family is her limit did Leslie remains in the critically high this is subjective  History of Present Illness:    Chief Complaint: Vaginal spotting    History of Present Illness: 33-year-old female reports she is pregnant, had outpatient ultrasound suggesting IUP 2 weeks prior, reports spotting last night, had lab work a month ago when her hCG was 10.  Patient concerned because she has had miscarriages and ectopic pregnancies, status post oophorectomy and only has 1 fallopian tube    Nurses Notes reviewed and agree, including vitals, allergies, social history and prior medical history.     REVIEW OF SYSTEMS: All systems reviewed and not pertinent unless noted.  Review of Systems      Positive for: Vaginal spotting, reported pregnant, reported 8 weeks by dates,    Negative for: Flank pain urinary symptoms    Past Medical History:   Diagnosis Date    Patient denies medical problems        Allergies:    Ceclor [cefaclor]      Past Surgical History:   Procedure Laterality Date    LAPAROSCOPIC EXPLORATION FOR ECTOPIC PREGNANCY N/A 12/4/2021    Procedure: LAPAROSCOPIC EXPLORATION FOR ECTOPIC PREGNANCY, RIGHT SALPINGECTOMY;  Surgeon: Deloris Leon MD;  Location: [facility];  Service: Obstetrics/Gynecology;  Laterality: N/A;    NO PAST SURGERIES      TONSILLECTOMY AND ADENOIDECTOMY           Social History     Socioeconomic History    Marital status:      Spouse name: rosangela    Number of children: 3    Highest education level: Bachelor's degree (e.g., BA, AB, BS)   Tobacco Use    Smoking status: Never    Smokeless tobacco: Never   Vaping Use    Vaping Use: Never used   Substance and Sexual Activity    Alcohol use: No    Drug use: No    Sexual activity: Yes     Partners: Male     Birth control/protection: None         History reviewed. No pertinent family history.    Objective  Physical Exam:  /86 (BP Location: Left arm, Patient Position: Sitting)   Pulse 88    "Temp 98 °F (36.7 °C) (Oral)   Resp 16   Ht 165.1 cm (65\")   Wt 94.3 kg (208 lb)   LMP 09/12/2023   SpO2 98%   BMI 34.61 kg/m²      Physical Exam    CONSTITUTIONAL: Well developed, nontoxic 33-year-old female,  in no acute distress.  VITAL SIGNS: per nursing, reviewed and noted  SKIN: exposed skin with no rashes, ulcerations or petechiae  EYES: Grossly EOMI, no icterus  ENT: Normal voice.  Moist mucous membranes   RESPIRATORY:  No increased work of breathing. No retractions.   CARDIOVASCULAR:   Extremities pink and warm.  Good cap refill to extremities.   GI: Abdomen without distention soft nontender  MUSCULOSKELETAL: Age-appropriate bulk and tone, moves all 4 extremities  NEUROLOGIC: Alert, oriented x 3. No gross deficits. GCS 15.   PSYCH: appropriate affect.  : no bladder tenderness or distention, no CVA tenderness    Procedures    ED Course:    Lab Results (last 24 hours)       Procedure Component Value Units Date/Time    hCG, Quantitative, Pregnancy [[id number]] Collected: 11/04/23 1320    Specimen: Blood Updated: 11/04/23 1508     HCG Quantitative 12,698.00 mIU/mL     Narrative:      HCG Ranges by Gestational Age    Females - non-pregnant premenopausal   </= 1mIU/mL HCG  Females - postmenopausal               </= 7mIU/mL HCG    3 Weeks         5.8 -    71.2 mIU/mL  4 Weeks         9.5 -     750 mIU/mL  5 Weeks         217 -   7,138 mIU/mL  6 Weeks         158 -  31,795 mIU/mL  7 Weeks       3,697 - 163,563 mIU/mL  8 Weeks      32,065 - 149,571 mIU/mL  9 Weeks      63,803 - 151,410 mIU/mL  10 Weeks     46,509 - 186,977 mIU/mL  12 Weeks     27,832 - 210,612 mIU/mL  14 Weeks     13,950 -  62,530 mIU/mL  15 Weeks     12,039 -  70,971 mIU/mL  16 Weeks      9,040 -  56,451 mIU/mL  17 Weeks      8,175 -  55,868 mIU/mL  18 Weeks      8,099 -  58,176 mIU/mL             US Ob Transvaginal    Result Date: 11/4/2023  PROCEDURE: US OB TRANSVAGINAL-  History: Vaginal spotting.  COMPARISON: None.  FINDINGS uterus an " intrauterine gestation sac with mean sac diameter 1.28 cm, corresponding to gestation age 6 weeks and 0-day. A fetal pole is identified with fetal crown-rump length measuring 3 mm, corresponding to gestation age 6 weeks and 0-day. Fetal heart rate is recorded at 90 bpm. Bilateral ovaries with arterial flow. No definite ovarian torsion. No free fluid in the pelvis. 6 mm uterine myometrial hypoechoic nodule may reflect a small fibroid.      Impression: Single live intrauterine pregnancy. No acute process..     This report was signed and finalized on 11/4/2023 3:24 PM by Shiva Vale MD.        MDM     Amount and/or Complexity of Data Reviewed  Clinical lab tests: reviewed  Tests in the radiology section of CPT®: reviewed        ED Course as of 11/05/23 1155   Sat Nov 04, 2023   1500 Patient advised she needed to leave, will follow results. [PF]      ED Course User Index  [PF] Jamaal Hooks, DO       Medical Decision Making:    Initial impression of presenting illness:  33-year-old female reports she is pregnant, had outpatient ultrasound suggesting IUP 2 weeks prior, reports spotting last night, had lab work a month ago when her hCG was 10.  Patient concerned because she has had miscarriages and ectopic pregnancies, status post oophorectomy and only has 1 fallopian tube    DDX: includes but is not limited to: Threatened miscarriage, less likely ectopic if the patient had reported IUP ultrasound, images not available for comparison.  No reports available          Patient arrives normotensive afebrile no tachycardia sats 98% with vitals interpreted by myself.     Pertinent features from physical exam: Benign exam.    Initial diagnostic plan: hCG quant, transvaginal ultrasound    Results from initial plan were reviewed and interpreted by me revealing quant 12,698, none, blood type is O+ on file, ultrasound reveals single living intrauterine pregnancy at 6 weeks gestation    Diagnostic information from other  sources: Old record review    Interventions / Re-evaluation: Patient left before results returned, I called patient with results.  Patient had no concerns.  Results/clinical rationale were discussed with patient    Consultations/Discussion of results with other physicians: None    Disposition plan: Discharge.  Outpatient follow-up with her obstetrician, return precautions were discussed.  -----      Final diagnoses:   Vaginal bleeding affecting early pregnancy            Jamaal Hooks, DO  11/05/23 1155

## 2023-11-08 ENCOUNTER — HOSPITAL ENCOUNTER (EMERGENCY)
Facility: HOSPITAL | Age: 33
Discharge: HOME | End: 2023-11-08
Attending: STUDENT IN AN ORGANIZED HEALTH CARE EDUCATION/TRAINING PROGRAM | Admitting: STUDENT IN AN ORGANIZED HEALTH CARE EDUCATION/TRAINING PROGRAM
Payer: COMMERCIAL

## 2023-11-08 ENCOUNTER — APPOINTMENT (OUTPATIENT)
Dept: ULTRASOUND IMAGING | Facility: HOSPITAL | Age: 33
End: 2023-11-08
Payer: COMMERCIAL

## 2023-11-08 VITALS
SYSTOLIC BLOOD PRESSURE: 128 MMHG | RESPIRATION RATE: 18 BRPM | WEIGHT: 209.2 LBS | HEIGHT: 65 IN | HEART RATE: 98 BPM | BODY MASS INDEX: 34.85 KG/M2 | OXYGEN SATURATION: 100 % | DIASTOLIC BLOOD PRESSURE: 73 MMHG | TEMPERATURE: 97.9 F

## 2023-11-08 DIAGNOSIS — O20.9 VAGINAL BLEEDING IN PREGNANCY, FIRST TRIMESTER: Primary | ICD-10-CM

## 2023-11-08 LAB
ALBUMIN SERPL-MCNC: 4.8 G/DL (ref 3.5–5.2)
ALBUMIN/GLOB SERPL: 1.5 G/DL
ALP SERPL-CCNC: 56 U/L (ref 39–117)
ALT SERPL W P-5'-P-CCNC: 18 U/L (ref 1–33)
ANION GAP SERPL CALCULATED.3IONS-SCNC: 10.3 MMOL/L (ref 5–15)
AST SERPL-CCNC: 19 U/L (ref 1–32)
BACTERIA UR QL AUTO: ABNORMAL /HPF
BASOPHILS # BLD AUTO: 0.07 10*3/MM3 (ref 0–0.2)
BASOPHILS NFR BLD AUTO: 0.8 % (ref 0–1.5)
BILIRUB SERPL-MCNC: 0.2 MG/DL (ref 0–1.2)
BILIRUB UR QL STRIP: NEGATIVE
BUN SERPL-MCNC: 9 MG/DL (ref 6–20)
BUN/CREAT SERPL: 15.5 (ref 7–25)
CALCIUM SPEC-SCNC: 9.4 MG/DL (ref 8.6–10.5)
CHLORIDE SERPL-SCNC: 101 MMOL/L (ref 98–107)
CLARITY UR: ABNORMAL
CO2 SERPL-SCNC: 25.7 MMOL/L (ref 22–29)
COLOR UR: YELLOW
CREAT SERPL-MCNC: 0.58 MG/DL (ref 0.57–1)
DEPRECATED RDW RBC AUTO: 40 FL (ref 37–54)
EGFRCR SERPLBLD CKD-EPI 2021: 121.2 ML/MIN/1.73
EOSINOPHIL # BLD AUTO: 0.48 10*3/MM3 (ref 0–0.4)
EOSINOPHIL NFR BLD AUTO: 5.6 % (ref 0.3–6.2)
ERYTHROCYTE [DISTWIDTH] IN BLOOD BY AUTOMATED COUNT: 12.2 % (ref 12.3–15.4)
GLOBULIN UR ELPH-MCNC: 3.3 GM/DL
GLUCOSE SERPL-MCNC: 91 MG/DL (ref 65–99)
GLUCOSE UR STRIP-MCNC: NEGATIVE MG/DL
HCG INTACT+B SERPL-ACNC: NORMAL MIU/ML
HCT VFR BLD AUTO: 34.8 % (ref 34–46.6)
HGB BLD-MCNC: 11.7 G/DL (ref 12–15.9)
HGB UR QL STRIP.AUTO: ABNORMAL
HOLD SPECIMEN: NORMAL
HOLD SPECIMEN: NORMAL
HYALINE CASTS UR QL AUTO: ABNORMAL /LPF
IMM GRANULOCYTES # BLD AUTO: 0.02 10*3/MM3 (ref 0–0.05)
IMM GRANULOCYTES NFR BLD AUTO: 0.2 % (ref 0–0.5)
KETONES UR QL STRIP: ABNORMAL
LEUKOCYTE ESTERASE UR QL STRIP.AUTO: NEGATIVE
LYMPHOCYTES # BLD AUTO: 2.47 10*3/MM3 (ref 0.7–3.1)
LYMPHOCYTES NFR BLD AUTO: 29.1 % (ref 19.6–45.3)
MCH RBC QN AUTO: 30.2 PG (ref 26.6–33)
MCHC RBC AUTO-ENTMCNC: 33.6 G/DL (ref 31.5–35.7)
MCV RBC AUTO: 89.7 FL (ref 79–97)
MONOCYTES # BLD AUTO: 0.43 10*3/MM3 (ref 0.1–0.9)
MONOCYTES NFR BLD AUTO: 5.1 % (ref 5–12)
NEUTROPHILS NFR BLD AUTO: 5.03 10*3/MM3 (ref 1.7–7)
NEUTROPHILS NFR BLD AUTO: 59.2 % (ref 42.7–76)
NITRITE UR QL STRIP: NEGATIVE
NRBC BLD AUTO-RTO: 0 /100 WBC (ref 0–0.2)
PH UR STRIP.AUTO: <=5 [PH] (ref 5–8)
PLATELET # BLD AUTO: 366 10*3/MM3 (ref 140–450)
PMV BLD AUTO: 9.3 FL (ref 6–12)
POTASSIUM SERPL-SCNC: 3.9 MMOL/L (ref 3.5–5.2)
PROT SERPL-MCNC: 8.1 G/DL (ref 6–8.5)
PROT UR QL STRIP: ABNORMAL
RBC # BLD AUTO: 3.88 10*6/MM3 (ref 3.77–5.28)
RBC # UR STRIP: ABNORMAL /HPF
REF LAB TEST METHOD: ABNORMAL
SODIUM SERPL-SCNC: 137 MMOL/L (ref 136–145)
SP GR UR STRIP: 1.02 (ref 1–1.03)
SQUAMOUS #/AREA URNS HPF: ABNORMAL /HPF
UROBILINOGEN UR QL STRIP: ABNORMAL
WBC # UR STRIP: ABNORMAL /HPF
WBC NRBC COR # BLD: 8.5 10*3/MM3 (ref 3.4–10.8)
WHOLE BLOOD HOLD COAG: NORMAL
WHOLE BLOOD HOLD SPECIMEN: NORMAL

## 2023-11-08 PROCEDURE — 36415 COLL VENOUS BLD VENIPUNCTURE: CPT

## 2023-11-08 PROCEDURE — 80053 COMPREHEN METABOLIC PANEL: CPT

## 2023-11-08 PROCEDURE — 76817 TRANSVAGINAL US OBSTETRIC: CPT

## 2023-11-08 PROCEDURE — 84702 CHORIONIC GONADOTROPIN TEST: CPT | Performed by: STUDENT IN AN ORGANIZED HEALTH CARE EDUCATION/TRAINING PROGRAM

## 2023-11-08 PROCEDURE — 99284 EMERGENCY DEPT VISIT MOD MDM: CPT

## 2023-11-08 PROCEDURE — 81001 URINALYSIS AUTO W/SCOPE: CPT | Performed by: PHYSICIAN ASSISTANT

## 2023-11-08 PROCEDURE — 85025 COMPLETE CBC W/AUTO DIFF WBC: CPT

## 2023-11-08 RX ORDER — SODIUM CHLORIDE 0.9 % (FLUSH) 0.9 %
10 SYRINGE (ML) INJECTION AS NEEDED
Status: DISCONTINUED | OUTPATIENT
Start: 2023-11-08 | End: 2023-11-08 | Stop reason: HOSPADM

## 2023-11-08 NOTE — ED PROVIDER NOTES
Subjective  History of Present Illness:    Chief Complaint:   Chief Complaint   Patient presents with    Vaginal Bleeding - Pregnant      History of Present Illness: Tea Nelson is a 33 y.o. female who presents to the emergency department complaining of vaginal bleeding in pregnancy.  Here Saturday told she was about 8 weeks pregnant had some light vaginal spotting then.  IUP confirmed them.  Had no abdominal pain at that time.  Comes in here as she had some increased vaginal bleeding without clots states that the blood turned more dark brown.  She is here requesting another ultrasound to confirm fetal viability.  Has only used 1 pad today.  Onset: Change in color of bleeding and increase in bleeding began today  Duration: Ongoing  Exacerbating / Alleviating factors: None  Associated symptoms: No abdominal pain, no vaginal clots, no dysuria      Nurses Notes reviewed and agree, including vitals, allergies, social history and prior medical history.     Review of Systems   Constitutional: Negative.    HENT: Negative.     Eyes: Negative.    Respiratory: Negative.     Cardiovascular: Negative.    Gastrointestinal: Negative.    Genitourinary:  Positive for vaginal bleeding.   Musculoskeletal: Negative.    Skin: Negative.    Neurological: Negative.    Psychiatric/Behavioral: Negative.         Past Medical History:   Diagnosis Date    Patient denies medical problems        Allergies:    Ceclor [cefaclor]      Past Surgical History:   Procedure Laterality Date    LAPAROSCOPIC EXPLORATION FOR ECTOPIC PREGNANCY N/A 12/4/2021    Procedure: LAPAROSCOPIC EXPLORATION FOR ECTOPIC PREGNANCY, RIGHT SALPINGECTOMY;  Surgeon: Deloris Leon MD;  Location: [facility];  Service: Obstetrics/Gynecology;  Laterality: N/A;    NO PAST SURGERIES      TONSILLECTOMY AND ADENOIDECTOMY           Social History     Socioeconomic History    Marital status:      Spouse name: rosangela    Number of children: 3    Highest education level:  "Bachelor's degree (e.g., BA, AB, BS)   Tobacco Use    Smoking status: Never    Smokeless tobacco: Never   Vaping Use    Vaping Use: Never used   Substance and Sexual Activity    Alcohol use: No    Drug use: No    Sexual activity: Yes     Partners: Male     Birth control/protection: None         History reviewed. No pertinent family history.    Objective  Physical Exam:  /73 (BP Location: Left arm, Patient Position: Sitting)   Pulse 98   Temp 97.9 °F (36.6 °C) (Oral)   Resp 18   Ht 165.1 cm (65\")   Wt 94.9 kg (209 lb 3.2 oz)   LMP 09/12/2023   SpO2 100%   BMI 34.81 kg/m²      Physical Exam  Vitals and nursing note reviewed.   Constitutional:       General: She is not in acute distress.     Appearance: Normal appearance. She is normal weight. She is not ill-appearing, toxic-appearing or diaphoretic.   HENT:      Head: Normocephalic and atraumatic.      Nose: Nose normal.   Eyes:      Extraocular Movements: Extraocular movements intact.   Cardiovascular:      Rate and Rhythm: Normal rate and regular rhythm.   Pulmonary:      Effort: Pulmonary effort is normal.   Abdominal:      General: Abdomen is flat.      Palpations: Abdomen is soft.      Tenderness: There is no abdominal tenderness. There is no guarding or rebound.   Musculoskeletal:         General: Normal range of motion.      Cervical back: Normal range of motion.   Skin:     General: Skin is warm and dry.   Neurological:      General: No focal deficit present.      Mental Status: She is alert. Mental status is at baseline.   Psychiatric:         Mood and Affect: Mood normal.         Behavior: Behavior normal.           Procedures    ED Course:         Lab Results (last 24 hours)       Procedure Component Value Units Date/Time    CBC & Differential [[id number]]  (Abnormal) Collected: 11/08/23 1457    Specimen: Blood Updated: 11/08/23 1527    Narrative:      The following orders were created for panel order CBC & Differential.  Procedure              "                  Abnormality         Status                     ---------                               -----------         ------                     CBC Auto Differential[[id number]]        Abnormal            Final result                 Please view results for these tests on the individual orders.    hCG, Quantitative, Pregnancy [[id number]] Collected: 11/08/23 1457    Specimen: Blood Updated: 11/08/23 1651     HCG Quantitative 13,103.00 mIU/mL     Narrative:      HCG Ranges by Gestational Age    Females - non-pregnant premenopausal   </= 1mIU/mL HCG  Females - postmenopausal               </= 7mIU/mL HCG    3 Weeks         5.8 -    71.2 mIU/mL  4 Weeks         9.5 -     750 mIU/mL  5 Weeks         217 -   7,138 mIU/mL  6 Weeks         158 -  31,795 mIU/mL  7 Weeks       3,697 - 163,563 mIU/mL  8 Weeks      32,065 - 149,571 mIU/mL  9 Weeks      63,803 - 151,410 mIU/mL  10 Weeks     46,509 - 186,977 mIU/mL  12 Weeks     27,832 - 210,612 mIU/mL  14 Weeks     13,950 -  62,530 mIU/mL  15 Weeks     12,039 -  70,971 mIU/mL  16 Weeks      9,040 -  56,451 mIU/mL  17 Weeks      8,175 -  55,868 mIU/mL  18 Weeks      8,099 -  58,176 mIU/mL    Comprehensive Metabolic Panel [[id number]] Collected: 11/08/23 1457    Specimen: Blood Updated: 11/08/23 1558     Glucose 91 mg/dL      BUN 9 mg/dL      Creatinine 0.58 mg/dL      Sodium 137 mmol/L      Potassium 3.9 mmol/L      Chloride 101 mmol/L      CO2 25.7 mmol/L      Calcium 9.4 mg/dL      Total Protein 8.1 g/dL      Albumin 4.8 g/dL      ALT (SGPT) 18 U/L      AST (SGOT) 19 U/L      Alkaline Phosphatase 56 U/L      Total Bilirubin 0.2 mg/dL      Globulin 3.3 gm/dL      A/G Ratio 1.5 g/dL      BUN/Creatinine Ratio 15.5     Anion Gap 10.3 mmol/L      eGFR 121.2 mL/min/1.73     Narrative:      GFR Normal >60  Chronic Kidney Disease <60  Kidney Failure <15      CBC Auto Differential [[id number]]  (Abnormal) Collected: 11/08/23 1457    Specimen: Blood Updated: 11/08/23 1527     WBC  8.50 10*3/mm3      RBC 3.88 10*6/mm3      Hemoglobin 11.7 g/dL      Hematocrit 34.8 %      MCV 89.7 fL      MCH 30.2 pg      MCHC 33.6 g/dL      RDW 12.2 %      RDW-SD 40.0 fl      MPV 9.3 fL      Platelets 366 10*3/mm3      Neutrophil % 59.2 %      Lymphocyte % 29.1 %      Monocyte % 5.1 %      Eosinophil % 5.6 %      Basophil % 0.8 %      Immature Grans % 0.2 %      Neutrophils, Absolute 5.03 10*3/mm3      Lymphocytes, Absolute 2.47 10*3/mm3      Monocytes, Absolute 0.43 10*3/mm3      Eosinophils, Absolute 0.48 10*3/mm3      Basophils, Absolute 0.07 10*3/mm3      Immature Grans, Absolute 0.02 10*3/mm3      nRBC 0.0 /100 WBC     Urinalysis With Microscopic If Indicated (No Culture) - Urine, Clean Catch [[id number]]  (Abnormal) Collected: 11/08/23 1641    Specimen: Urine, Clean Catch Updated: 11/08/23 1649     Color, UA Yellow     Appearance, UA Cloudy     pH, UA <=5.0     Specific Gravity, UA 1.025     Glucose, UA Negative     Ketones, UA 15 mg/dL (1+)     Bilirubin, UA Negative     Blood, UA Large (3+)     Protein, UA Trace     Leuk Esterase, UA Negative     Nitrite, UA Negative     Urobilinogen, UA 0.2 E.U./dL    Urinalysis, Microscopic Only - Urine, Clean Catch [[id number]]  (Abnormal) Collected: 11/08/23 1641    Specimen: Urine, Clean Catch Updated: 11/08/23 1700     RBC, UA Too Numerous to Count /HPF      WBC, UA None Seen /HPF      Bacteria, UA None Seen /HPF      Squamous Epithelial Cells, UA 3-6 /HPF      Hyaline Casts, UA None Seen /LPF      Methodology Manual Light Microscopy             US Ob Transvaginal    Result Date: 11/8/2023  FINAL REPORT TECHNIQUE: Transvaginal sonographic images the pelvis were obtained. CLINICAL HISTORY: vaginal bleeding, 8 weeks pregnant FINDINGS: There is a gestational sac within the endometrial canal.  The gestational sac diameter was not measured.  There is a possible fetal pole seen measuring 4.27 mm with a corresponding gestational age of 6 weeks 1 day.  A yolk sac measures  0.44 cm. There are questionable fetal heart tones seen measuring 93 BPM. The left ovary contains a corpus luteal cyst, and is otherwise normal.  There is a small cystic area within the uterus.  The right ovary appears normal.     Impression: Findings are most consistent with a live intrauterine gestation with estimated gestational age of 6 weeks 1 day.  Follow-up ultrasound and beta hCG levels in 3 weeks is recommended and should be performed at a dedicated obstetric/gynecological facility. Authenticated and Electronically Signed by MD Villareal Richard on 11/08/2023 08:25:54 PM        Medical Decision Making  Amount and/or Complexity of Data Reviewed  Labs: ordered.  Radiology: ordered.    Risk  Prescription drug management.        Tea Nelson is a 33 y.o. female who presents to the emergency department for evaluation of vaginal bleeding in pregnancy    Differential diagnosis includes miscarriage, subchorionic hemorrhage among other etiologies.    Cysts CBC, CMP, hCG quant, transvaginal ultrasound, urinalysis ordered for further evaluation of the patient's presentation.    Chart review if available included outside testing, previous visits, prior labs, prior imaging, available notes from prior evaluations or visits with specialists, medication list, allergies, past medical history, past surgical history when applicable.    Patient was treated with N/A    Patient's hCG is only gone from 12,698 4 days ago to 13,103 today.  This is concerning for a possible early/threatened miscarriage.  Ultrasound shows IUP with questionable heart rate 93, given early gestation, not doubling over the past 4 days and no significant change on ultrasound concern will be for possible threatened miscarriage.    Plan for disposition is discharged home.  Patient/family comfortable with and understanding of the plan.      Final diagnoses:   Vaginal bleeding in pregnancy, first trimester          Thomas Carnes PA-C  11/08/23  2032

## 2023-11-09 ENCOUNTER — TELEPHONE (OUTPATIENT)
Dept: OBSTETRICS AND GYNECOLOGY | Facility: CLINIC | Age: 33
End: 2023-11-09
Payer: COMMERCIAL

## 2023-11-09 DIAGNOSIS — Z34.90 PREGNANCY, UNSPECIFIED GESTATIONAL AGE: Primary | ICD-10-CM

## 2023-11-09 NOTE — TELEPHONE ENCOUNTER
----- Message from Deloris Leon MD sent at 11/9/2023  7:27 AM EST -----  Patient needs follow up on Friday with repeat hcg level.  She was seen in the ER.

## 2023-11-10 ENCOUNTER — TELEPHONE (OUTPATIENT)
Dept: OBSTETRICS AND GYNECOLOGY | Facility: CLINIC | Age: 33
End: 2023-11-10

## 2023-11-10 ENCOUNTER — LAB (OUTPATIENT)
Dept: LAB | Facility: HOSPITAL | Age: 33
End: 2023-11-10
Payer: COMMERCIAL

## 2023-11-10 DIAGNOSIS — Z34.90 PREGNANCY, UNSPECIFIED GESTATIONAL AGE: ICD-10-CM

## 2023-11-10 LAB — HCG INTACT+B SERPL-ACNC: NORMAL MIU/ML

## 2023-11-10 PROCEDURE — 84702 CHORIONIC GONADOTROPIN TEST: CPT

## 2023-11-10 NOTE — TELEPHONE ENCOUNTER
Level has declined.   Pelvic rest off feetthrough weekend.  Would come in on Monday for repeat TVS in the office.  Thank you

## 2023-11-10 NOTE — TELEPHONE ENCOUNTER
Provider: DR ERAZO     Caller: ALIX GEE    Relationship to Patient: [facility]          Reason for Call: [facility]  IS CALLING TO CHECK THE LAB RESULTS FROM TODAY HE WOULD LIKE SOMEONE TO CALL HIM HE IS ON THE BH VERBAL PLEASE CALL

## 2023-11-16 ENCOUNTER — OFFICE VISIT (OUTPATIENT)
Dept: OBSTETRICS AND GYNECOLOGY | Facility: CLINIC | Age: 33
End: 2023-11-16
Payer: COMMERCIAL

## 2023-11-16 VITALS
SYSTOLIC BLOOD PRESSURE: 110 MMHG | HEIGHT: 65 IN | DIASTOLIC BLOOD PRESSURE: 72 MMHG | WEIGHT: 211.6 LBS | BODY MASS INDEX: 35.25 KG/M2

## 2023-11-16 DIAGNOSIS — O03.9 SAB (SPONTANEOUS ABORTION): Primary | ICD-10-CM

## 2023-11-16 NOTE — PROGRESS NOTES
"Subjective   Chief Complaint   Patient presents with    Miscarriage     Patient here for ER follow up. TVS done today. Complains of heavy bleeding.     Tea Nelson is a 33 y.o. year old .  Patient's last menstrual period was 2023.  She presents to be seen because of SAB- heavy bleeding tapering now hcg levels Rh +.     OTHER COMPLAINTS:  Nothing else    The following portions of the patient's history were reviewed and updated as appropriate:She  has a past medical history of Patient denies medical problems.  She does not have any pertinent problems on file.  She  has a past surgical history that includes Tonsillectomy and adenoidectomy; No past surgeries; and laparoscopic exploration for ectopic pregnancy (N/A, 2021).  Her family history is not on file.  She  reports that she has never smoked. She has never used smokeless tobacco. She reports that she does not drink alcohol and does not use drugs.  No current outpatient medications on file.     No current facility-administered medications for this visit.     No current outpatient medications on file prior to visit.     No current facility-administered medications on file prior to visit.     She is allergic to ceclor [cefaclor].    Social History    Tobacco Use      Smoking status: Never      Smokeless tobacco: Never    Review of Systems  Consitutional POS: nothing reported    NEG: anorexia or night sweats   Gastointestinal POS: nothing reported    NEG: bloating, change in bowel habits, melena, or reflux symptoms   Genitourinary POS: nothing reported    NEG: dysuria or hematuria   Integument POS: nothing reported    NEG: moles that are changing in size, shape, color or rashes   Breast POS: nothing reported    NEG: persistent breast lump, skin dimpling, or nipple discharge         Respiratory: negative  Cardiovascular: negative  GYN:   see HPI          Objective   /72   Ht 165.1 cm (65\")   Wt 96 kg (211 lb 9.6 oz)   LMP 2023   BMI " 35.21 kg/m²     General:  well developed; well nourished  no acute distress   Skin:  No suspicious lesions seen   Thyroid: not examined   Lungs:  breathing is unlabored   Heart:  Not performed.   Breasts:  Not performed.   Abdomen: soft, non-tender; no masses  no umbilical or inguinal hernias are present  no hepato-splenomegaly   Pelvis: Not performed.     Psychiatric: Alert and oriented ×3, mood and affect appropriate  HEENT: Atraumatic, normocephalic, normal scleral icterus  Extremities: 2+ pulses bilaterally, no edema      Lab Review   CBC and HCG    Imaging   Anteverted uterus.  Endometrium thin.  5 to 7 mm depending on measurement.  Normal adnexa and cervix.  No masses.  No free fluid       Assessment   Presumed spontaneous miscarriage based on ultrasound appearance today.  Weight gain     Plan   Memo findings with patient partner.  Follow serial hCGs.  Pelvic rest until resolution.  We will check a TSH and a free T4 as well given her weight gain.  If significant bleeding persists/recurs in the next 7 to 14 days let me know.      No orders of the defined types were placed in this encounter.         This note was electronically signed.      November 16, 2023

## 2023-11-17 LAB
HCG INTACT+B SERPL-ACNC: NORMAL MIU/ML
T4 FREE SERPL-MCNC: 1.21 NG/DL (ref 0.93–1.7)
TSH SERPL DL<=0.005 MIU/L-ACNC: 2.01 UIU/ML (ref 0.27–4.2)

## 2023-11-24 ENCOUNTER — LAB (OUTPATIENT)
Dept: LAB | Facility: HOSPITAL | Age: 33
End: 2023-11-24
Payer: COMMERCIAL

## 2023-11-24 DIAGNOSIS — O03.9 SAB (SPONTANEOUS ABORTION): Primary | ICD-10-CM

## 2023-11-24 LAB — HCG INTACT+B SERPL-ACNC: 83.97 MIU/ML

## 2023-11-24 PROCEDURE — 36415 COLL VENOUS BLD VENIPUNCTURE: CPT

## 2023-11-24 PROCEDURE — 84702 CHORIONIC GONADOTROPIN TEST: CPT

## 2023-11-27 DIAGNOSIS — O03.9 SAB (SPONTANEOUS ABORTION): Primary | ICD-10-CM

## 2023-11-29 ENCOUNTER — LAB (OUTPATIENT)
Dept: LAB | Facility: HOSPITAL | Age: 33
End: 2023-11-29
Payer: COMMERCIAL

## 2023-11-29 PROCEDURE — 84702 CHORIONIC GONADOTROPIN TEST: CPT | Performed by: OBSTETRICS & GYNECOLOGY

## 2023-12-05 ENCOUNTER — LAB (OUTPATIENT)
Dept: LAB | Facility: HOSPITAL | Age: 33
End: 2023-12-05
Payer: COMMERCIAL

## 2023-12-05 DIAGNOSIS — O02.1 MISSED ABORTION: Primary | ICD-10-CM

## 2023-12-05 DIAGNOSIS — O03.9 SAB (SPONTANEOUS ABORTION): ICD-10-CM

## 2023-12-05 DIAGNOSIS — O03.9 SAB (SPONTANEOUS ABORTION): Primary | ICD-10-CM

## 2023-12-05 LAB — HCG INTACT+B SERPL-ACNC: 8.66 MIU/ML

## 2023-12-05 PROCEDURE — 36415 COLL VENOUS BLD VENIPUNCTURE: CPT

## 2023-12-05 PROCEDURE — 84702 CHORIONIC GONADOTROPIN TEST: CPT

## 2023-12-13 ENCOUNTER — LAB (OUTPATIENT)
Dept: LAB | Facility: HOSPITAL | Age: 33
End: 2023-12-13
Payer: COMMERCIAL

## 2023-12-13 DIAGNOSIS — O02.1 MISSED ABORTION: Primary | ICD-10-CM

## 2023-12-13 PROCEDURE — 84702 CHORIONIC GONADOTROPIN TEST: CPT | Performed by: OBSTETRICS & GYNECOLOGY

## 2023-12-19 ENCOUNTER — LAB (OUTPATIENT)
Dept: LAB | Facility: HOSPITAL | Age: 33
End: 2023-12-19
Payer: COMMERCIAL

## 2023-12-19 DIAGNOSIS — O02.1 MISSED ABORTION: ICD-10-CM

## 2023-12-19 LAB — HCG INTACT+B SERPL-ACNC: 3.38 MIU/ML

## 2023-12-19 PROCEDURE — 84702 CHORIONIC GONADOTROPIN TEST: CPT

## 2023-12-19 PROCEDURE — 36415 COLL VENOUS BLD VENIPUNCTURE: CPT

## 2023-12-20 DIAGNOSIS — O02.1 MISSED ABORTION: Primary | ICD-10-CM

## 2023-12-26 ENCOUNTER — LAB (OUTPATIENT)
Dept: LAB | Facility: HOSPITAL | Age: 33
End: 2023-12-26
Payer: COMMERCIAL

## 2023-12-26 DIAGNOSIS — O02.1 MISSED ABORTION: ICD-10-CM

## 2023-12-26 LAB — HCG INTACT+B SERPL-ACNC: 1.69 MIU/ML

## 2023-12-26 PROCEDURE — 84702 CHORIONIC GONADOTROPIN TEST: CPT

## 2023-12-27 DIAGNOSIS — O02.1 MISSED ABORTION: Primary | ICD-10-CM

## 2024-01-02 ENCOUNTER — LAB (OUTPATIENT)
Dept: LAB | Facility: HOSPITAL | Age: 34
End: 2024-01-02
Payer: COMMERCIAL

## 2024-01-02 DIAGNOSIS — O02.1 MISSED ABORTION: Primary | ICD-10-CM

## 2024-01-02 LAB — HCG INTACT+B SERPL-ACNC: 1.03 MIU/ML

## 2024-01-02 PROCEDURE — 36415 COLL VENOUS BLD VENIPUNCTURE: CPT

## 2024-01-02 PROCEDURE — 84702 CHORIONIC GONADOTROPIN TEST: CPT

## 2024-01-03 DIAGNOSIS — O02.1 MISSED ABORTION: Primary | ICD-10-CM

## 2024-01-03 NOTE — PROGRESS NOTES
We are almost complete.  Level is now 1.03.  Anything less than 1 is negative.  Repeat 1 week.  Thank you

## 2024-01-12 ENCOUNTER — LAB (OUTPATIENT)
Dept: LAB | Facility: HOSPITAL | Age: 34
End: 2024-01-12
Payer: COMMERCIAL

## 2024-01-12 DIAGNOSIS — O02.1 MISSED ABORTION: ICD-10-CM

## 2024-01-12 LAB — HCG INTACT+B SERPL-ACNC: 0.56 MIU/ML

## 2024-01-12 PROCEDURE — 84702 CHORIONIC GONADOTROPIN TEST: CPT

## 2024-02-20 ENCOUNTER — TELEPHONE (OUTPATIENT)
Dept: OBSTETRICS AND GYNECOLOGY | Facility: CLINIC | Age: 34
End: 2024-02-20
Payer: COMMERCIAL

## 2024-02-20 DIAGNOSIS — Z34.90 PREGNANCY, UNSPECIFIED GESTATIONAL AGE: Primary | ICD-10-CM

## 2024-02-20 NOTE — TELEPHONE ENCOUNTER
----- Message from Oh Chatman sent at 2/20/2024 11:46 AM EST -----  Pt had a positive home pregnancy test this morning, she had a miscarriage in December. She had her last HCG test done on 1/12/24 and the level was .56. She has a New OB appt scheduled 4/4/24. Do you want her to repeat the hcg prior to that? Thank you

## 2024-02-21 ENCOUNTER — LAB (OUTPATIENT)
Dept: LAB | Facility: HOSPITAL | Age: 34
End: 2024-02-21
Payer: COMMERCIAL

## 2024-02-21 ENCOUNTER — LAB REQUISITION (OUTPATIENT)
Dept: LAB | Facility: HOSPITAL | Age: 34
End: 2024-02-21
Payer: COMMERCIAL

## 2024-02-21 DIAGNOSIS — O36.80X0 PREGNANCY WITH UNCERTAIN FETAL VIABILITY, SINGLE OR UNSPECIFIED FETUS: Primary | ICD-10-CM

## 2024-02-21 DIAGNOSIS — O36.80X0 PREGNANCY WITH INCONCLUSIVE FETAL VIABILITY, NOT APPLICABLE OR UNSPECIFIED: ICD-10-CM

## 2024-02-21 DIAGNOSIS — O36.80X0 PREGNANCY WITH UNCERTAIN FETAL VIABILITY, SINGLE OR UNSPECIFIED FETUS: ICD-10-CM

## 2024-02-21 LAB — HCG INTACT+B SERPL-ACNC: 3418 MIU/ML

## 2024-02-21 PROCEDURE — 84702 CHORIONIC GONADOTROPIN TEST: CPT | Performed by: OBSTETRICS & GYNECOLOGY

## 2024-02-21 PROCEDURE — 84144 ASSAY OF PROGESTERONE: CPT | Performed by: OBSTETRICS & GYNECOLOGY

## 2024-02-21 PROCEDURE — 36415 COLL VENOUS BLD VENIPUNCTURE: CPT

## 2024-02-22 LAB — PROGEST SERPL-MCNC: 13.2 NG/ML

## 2024-02-28 ENCOUNTER — TELEPHONE (OUTPATIENT)
Dept: OBSTETRICS AND GYNECOLOGY | Facility: CLINIC | Age: 34
End: 2024-02-28
Payer: COMMERCIAL

## 2024-02-28 NOTE — TELEPHONE ENCOUNTER
----- Message from Shwetha Rojo sent at 2/28/2024  2:20 PM EST -----  Pt is requesting a script for  prenatal vitamins & a sleep aid.    RX: [facility]

## 2024-02-29 RX ORDER — PNV NO.95/FERROUS FUM/FOLIC AC 28MG-0.8MG
1 TABLET ORAL DAILY
Qty: 30 TABLET | Refills: 10 | Status: SHIPPED | OUTPATIENT
Start: 2024-02-29

## 2024-02-29 RX ORDER — DOXYLAMINE SUCCINATE 25 MG/1
25 TABLET ORAL NIGHTLY PRN
Qty: 30 TABLET | Refills: 1 | Status: SHIPPED | OUTPATIENT
Start: 2024-02-29

## 2024-03-01 ENCOUNTER — NURSE TRIAGE (OUTPATIENT)
Dept: CALL CENTER | Facility: HOSPITAL | Age: 34
End: 2024-03-01
Payer: COMMERCIAL

## 2024-03-01 NOTE — TELEPHONE ENCOUNTER
"C/o tooth and gum pain. Patient is 7 weeks pregnant. Has completed antibiotics and using orajel. Left facial swelling, difficulty eating or drinking anything hot or cold. Continues to c/o severe pain. Advised patient's  to take patient to ER or find emergency dentist.    Reason for Disposition   [1] SEVERE pain (e.g., excruciating, unable to eat, unable to do any normal activities) AND [2] not improved 2 hours after pain medicine    Additional Information   Negative: Shock suspected (e.g., cold/pale/clammy skin, too weak to stand, low BP, rapid pulse)   Negative: [1] Similar pain previously AND [2] it was from \"heart attack\"   Negative: [1] Similar pain previously AND [2] it was from \"angina\" AND [3] not relieved by nitroglycerin   Negative: Sounds like a life-threatening emergency to the triager   Negative: Chest pain   Negative: Toothache followed tooth injury   Negative: Toothache or mouth pain after tooth extraction   Negative: Canker sore (i.e., aphthous ulcer)   Negative: Tongue is very swollen and tender   Negative: [1] Face is swollen AND [2] fever   Negative: Patient sounds very sick or weak to the triager    Answer Assessment - Initial Assessment Questions  1. LOCATION: \"Which tooth is hurting?\"  (e.g., right-side/left-side, upper/lower, front/back)      Multiple teeth on left side  2. ONSET: \"When did the toothache start?\"  (e.g., hours, days)       Weeks   3. SEVERITY: \"How bad is the toothache?\"  (Scale 1-10; mild, moderate or severe)    - MILD (1-3): doesn't interfere with chewing     - MODERATE (4-7): interferes with chewing, interferes with normal activities, awakens from sleep      - SEVERE (8-10): unable to eat, unable to do any normal activities, excruciating pain         Severe  4. SWELLING: \"Is there any visible swelling of your face?\"      Yes   5. OTHER SYMPTOMS: \"Do you have any other symptoms?\" (e.g., fever)      No   6. PREGNANCY: \"Is there any chance you are pregnant?\" \"When was " "your last menstrual period?\"      7 weeks pregnant    Protocols used: Toothache-ADULT-AH    "

## 2024-03-03 ENCOUNTER — HOSPITAL ENCOUNTER (EMERGENCY)
Facility: HOSPITAL | Age: 34
Discharge: HOME | End: 2024-03-03
Attending: EMERGENCY MEDICINE | Admitting: EMERGENCY MEDICINE
Payer: COMMERCIAL

## 2024-03-03 VITALS
SYSTOLIC BLOOD PRESSURE: 139 MMHG | TEMPERATURE: 98.7 F | RESPIRATION RATE: 16 BRPM | HEIGHT: 65 IN | OXYGEN SATURATION: 100 % | WEIGHT: 218.2 LBS | HEART RATE: 86 BPM | DIASTOLIC BLOOD PRESSURE: 81 MMHG | BODY MASS INDEX: 36.35 KG/M2

## 2024-03-03 DIAGNOSIS — K04.7 DENTAL ABSCESS: Primary | ICD-10-CM

## 2024-03-03 LAB — HCG INTACT+B SERPL-ACNC: NORMAL MIU/ML

## 2024-03-03 PROCEDURE — 99283 EMERGENCY DEPT VISIT LOW MDM: CPT

## 2024-03-03 PROCEDURE — 84702 CHORIONIC GONADOTROPIN TEST: CPT | Performed by: PHYSICIAN ASSISTANT

## 2024-03-03 PROCEDURE — 36415 COLL VENOUS BLD VENIPUNCTURE: CPT

## 2024-03-03 RX ORDER — LIDOCAINE HYDROCHLORIDE 20 MG/ML
10 SOLUTION OROPHARYNGEAL ONCE
Status: COMPLETED | OUTPATIENT
Start: 2024-03-03 | End: 2024-03-03

## 2024-03-03 RX ADMIN — LIDOCAINE HYDROCHLORIDE 10 ML: 20 SOLUTION ORAL at 18:22

## 2024-03-03 RX ADMIN — TOPICAL ANESTHETIC 1 SPRAY: 200 SPRAY DENTAL; PERIODONTAL at 18:22

## 2024-03-03 NOTE — DISCHARGE INSTRUCTIONS
Please keep your appointment with your dentist Tuesday, we have also given you the information for the [facility] dental clinic, they may be able to see you Monday if you either call or show up.  Please continue your Augmentin.

## 2024-03-03 NOTE — ED PROVIDER NOTES
Subjective  History of Present Illness:    Chief Complaint:   Chief Complaint   Patient presents with    Dental Problem      History of Present Illness: Tea Nelson is a 33 y.o. female who presents to the emergency department complaining of right dental pain.  Has had dental pain for 1 week.  Was on clindamycin from Tuesday to Friday but switch to Augmentin Friday has had 5 doses.  States she started have some swelling to the right jaw area.  No fevers.  She is 70 weeks pregnant requesting an hCG to be checked she has absolutely no abdominal pain, no vaginal bleeding, she does have a history miscarriages and she does not see her OB until April and given the past miscarriages was hoping to just have an hCG checked to ensure that its within acceptable limits.  Onset: Dental pain began 1 week ago  Duration: Ongoing  Exacerbating / Alleviating factors: Chewing and eating make her pain worse  Associated symptoms: None      Nurses Notes reviewed and agree, including vitals, allergies, social history and prior medical history.     Review of Systems   Constitutional:  Negative for fever.   HENT:  Positive for dental problem.    Gastrointestinal:  Negative for abdominal pain.   Genitourinary:  Negative for dysuria, pelvic pain, vaginal bleeding and vaginal discharge.       Past Medical History:   Diagnosis Date    Patient denies medical problems        Allergies:    Ceclor [cefaclor]      Past Surgical History:   Procedure Laterality Date    LAPAROSCOPIC EXPLORATION FOR ECTOPIC PREGNANCY N/A 12/4/2021    Procedure: LAPAROSCOPIC EXPLORATION FOR ECTOPIC PREGNANCY, RIGHT SALPINGECTOMY;  Surgeon: Deloris Leon MD;  Location: [facility];  Service: Obstetrics/Gynecology;  Laterality: N/A;    NO PAST SURGERIES      TONSILLECTOMY AND ADENOIDECTOMY           Social History     Socioeconomic History    Marital status:      Spouse name: rosangela    Number of children: 3    Highest education level: Bachelor's degree (e.g., BA, AB,  "BS)   Tobacco Use    Smoking status: Never    Smokeless tobacco: Never   Vaping Use    Vaping status: Never Used   Substance and Sexual Activity    Alcohol use: No    Drug use: No    Sexual activity: Yes     Partners: Male     Birth control/protection: None         History reviewed. No pertinent family history.    Objective  Physical Exam:  /81   Pulse 86   Temp 98.7 °F (37.1 °C) (Oral)   Resp 16   Ht 165.1 cm (65\")   Wt 99 kg (218 lb 3.2 oz)   LMP 01/18/2024   SpO2 100%   BMI 36.31 kg/m²      Physical Exam  Vitals and nursing note reviewed.   Constitutional:       Appearance: Normal appearance.   HENT:      Head: Normocephalic and atraumatic.      Comments: Some mild swelling to the right submandibular area.  No facial cellulitis.  Oral cavity normal no intraoral abscess no abnormality about the right lower second molar visually where patient's pain is located.  Airway is patent.     Nose: Nose normal.      Mouth/Throat:      Mouth: Mucous membranes are moist.   Eyes:      Extraocular Movements: Extraocular movements intact.   Cardiovascular:      Rate and Rhythm: Normal rate and regular rhythm.   Pulmonary:      Effort: Pulmonary effort is normal.   Abdominal:      General: Abdomen is flat.   Musculoskeletal:         General: Normal range of motion.      Cervical back: Normal range of motion.   Skin:     General: Skin is warm and dry.   Neurological:      General: No focal deficit present.      Mental Status: She is alert. Mental status is at baseline.   Psychiatric:         Mood and Affect: Mood normal.         Behavior: Behavior normal.           Procedures    ED Course:         Lab Results (last 24 hours)       ** No results found for the last 24 hours. **             No radiology results from the last 24 hrs                          Medical Decision Making  Amount and/or Complexity of Data Reviewed  Labs: ordered.    Risk  OTC drugs.  Prescription drug management.              Tea Nelson is a " 33 y.o. female who presents to the emergency department for evaluation of dental pain    Differential diagnosis includes dental abscess, dental caries among other etiologies.      Chart review if available included outside testing, previous visits, prior labs, prior imaging, available notes from prior evaluations or visits with specialists, medication list, allergies, past medical history, past surgical history when applicable.    Patient was treated with   Medications   benzocaine (HURRICAINE) 20 % liquid solution 1 spray (has no administration in time range)   Lidocaine Viscous HCl (XYLOCAINE) 2 % solution 10 mL (has no administration in time range)       Patient has no abdominal pain vaginal bleeding or any pregnancy complaints but does not have OB appointment until next month.  She did request an ECG which she will follow-up on [facility].  Did discuss with Dr. Enamorado, given the fact patient is already been on clindamycin and on Augmentin currently day 3 and has appointment with dentistry Tuesday feel as though she is stable for discharge home to follow-up with dentistry in 2 days.    Plan for disposition is discharged home.  Patient/family comfortable with and understanding of the plan.      Final diagnoses:   Dental abscess          Thomas Carnes PA-C  03/03/24 1003

## 2024-04-04 ENCOUNTER — INITIAL PRENATAL (OUTPATIENT)
Dept: OBSTETRICS AND GYNECOLOGY | Facility: CLINIC | Age: 34
End: 2024-04-04
Payer: COMMERCIAL

## 2024-04-04 VITALS — SYSTOLIC BLOOD PRESSURE: 108 MMHG | BODY MASS INDEX: 35.78 KG/M2 | DIASTOLIC BLOOD PRESSURE: 64 MMHG | WEIGHT: 215 LBS

## 2024-04-04 DIAGNOSIS — O09.521 MULTIGRAVIDA OF ADVANCED MATERNAL AGE IN FIRST TRIMESTER: ICD-10-CM

## 2024-04-04 DIAGNOSIS — O36.80X0 ENCOUNTER TO DETERMINE FETAL VIABILITY OF PREGNANCY, SINGLE OR UNSPECIFIED FETUS: ICD-10-CM

## 2024-04-04 DIAGNOSIS — Z34.91 PRENATAL CARE IN FIRST TRIMESTER: Primary | ICD-10-CM

## 2024-04-05 PROBLEM — O09.521 MULTIGRAVIDA OF ADVANCED MATERNAL AGE IN FIRST TRIMESTER: Status: ACTIVE | Noted: 2024-04-05

## 2024-04-05 NOTE — PROGRESS NOTES
New Pregnancy Visit    Subjective   Chief Complaint   Patient presents with    Initial Prenatal Visit     LMP 24, TVS done today 11w4d. No complaints       Tea Nelson is a 33 y.o. year old .  Patient's last menstrual period was 2024.  She presents to initiate prenatal care with our group today.     4 previous term vaginal births.  Most recent delivery was 2022.  Pelvis proven to 8 pounds 14 ounces. History of ectopic pregnancy requiring laparoscopic right salpingectomy in 2021.    Social History    Tobacco Use      Smoking status: Never      Smokeless tobacco: Never      Current Outpatient Medications on File Prior to Visit   Medication Sig Dispense Refill    doxylamine (Unisom SleepTabs) 25 MG tablet Take 1 tablet by mouth At Night As Needed for Sleep. 30 tablet 1    Prenatal Vit-Fe Fumarate-FA (prenatal vitamin 28-0.8) 28-0.8 MG tablet tablet Take 1 tablet by mouth Daily. 30 tablet 10     No current facility-administered medications on file prior to visit.          Objective   /64   Wt 97.5 kg (215 lb)   LMP 2024   BMI 35.78 kg/m²   Physical Exam:  Normal, gestational age-appropriate exam today        Medical Decision Making:    Lab Review:   No data reviewed    Note Review:  Delivery note for  2022    Imaging Review:  Pelvic ultrasound report   IUP measuring 11+4 weeks with appropriate fetal cardiac activity. LIBAN is set at 10/24/2024 (Sure LMP). The cervix and bilateral ovaries are normal in appearance. No free fluid in the cul-de-sac.   Assessment   IUP at 11+0 weeks  Supervision of high risk pregnancy   Short interval pregnancy  AMA  H/O ectopic pregnancy requiring R salpingectomy     Plan    The problem list for pregnancy was initiated today  Tests/Orders/Rx for today:  Orders Placed This Encounter   Procedures    Chlamydia trachomatis, Neisseria gonorrhoeae, PCR w/ confirmation - Urine, Urine, Clean Catch     Order Specific Question:   Release to patient      Answer:   Routine Release [1400000002]     Ob < 14 Weeks Single or First Gestation     Order Specific Question:   Reason for Exam:     Answer:   NOB, dates, viability     Order Specific Question:   Release to patient     Answer:   Routine Release [1400000002]    OB Panel With HIV     Order Specific Question:   Release to patient     Answer:   Routine Release [1400000002]    [id number] PLUS Core+SCA - Blood,     Order Specific Question:   [facility] Date of last menstrual period or estimated date of delivery (corresponding to calculation method):     Answer:   10/24/2024     Order Specific Question:   [facility] Gestational age calculation method:     Answer:   LIBAN,EDC     Order Specific Question:   Release to patient     Answer:   Routine Release [1400000002]       Medication Management: None    Testing for GC / Chlamydia / trichomonas was done today  Genetic testing reviewed: NIPT ([id number])  Information reviewed: exercise in pregnancy, nutrition in pregnancy, weight gain in pregnancy, work and travel restrictions during pregnancy, list of OTC medications acceptable in pregnancy and call coverage groups    Follow up: 4 week(s)    Thong Culver MD  Obstetrics and Gynecology  [facility]

## 2024-04-08 LAB
ABO GROUP BLD: ABNORMAL
BASOPHILS # BLD AUTO: 0.1 X10E3/UL (ref 0–0.2)
BASOPHILS NFR BLD AUTO: 1 %
BLD GP AB SCN SERPL QL: NEGATIVE
C TRACH RRNA SPEC QL NAA+PROBE: NEGATIVE
CFDNA.FET/CFDNA.TOTAL SFR FETUS: NORMAL %
CITATION REF LAB TEST: NORMAL
EOSINOPHIL # BLD AUTO: 0.6 X10E3/UL (ref 0–0.4)
EOSINOPHIL NFR BLD AUTO: 7 %
ERYTHROCYTE [DISTWIDTH] IN BLOOD BY AUTOMATED COUNT: 12 % (ref 11.7–15.4)
FET 13+18+21+X+Y ANEUP PLAS.CFDNA: NEGATIVE
FET CHR 21 TS PLAS.CFDNA QL: NEGATIVE
FET MS X RISK WBC.DNA+CFDNA QL: NOT DETECTED
FET SEX PLAS.CFDNA DOSAGE CFDNA: NORMAL
FET TS 13 RISK PLAS.CFDNA QL: NEGATIVE
FET TS 18 RISK WBC.DNA+CFDNA QL: NEGATIVE
FET X + Y ANEUP RISK PLAS.CFDNA SEQ-IMP: NOT DETECTED
GA EST FROM CONCEPTION DATE: NORMAL D
GESTATIONAL AGE > 9:: YES
HBV SURFACE AG SERPL QL IA: NEGATIVE
HCT VFR BLD AUTO: 35.2 % (ref 34–46.6)
HCV IGG SERPL QL IA: NON REACTIVE
HGB BLD-MCNC: 11.7 G/DL (ref 11.1–15.9)
HIV 1+2 AB+HIV1 P24 AG SERPL QL IA: NON REACTIVE
IMM GRANULOCYTES # BLD AUTO: 0 X10E3/UL (ref 0–0.1)
IMM GRANULOCYTES NFR BLD AUTO: 0 %
LAB DIRECTOR NAME PROVIDER: NORMAL
LAB DIRECTOR NAME PROVIDER: NORMAL
LABORATORY COMMENT REPORT: NORMAL
LIMITATIONS OF THE TEST: NORMAL
LYMPHOCYTES # BLD AUTO: 2.5 X10E3/UL (ref 0.7–3.1)
LYMPHOCYTES NFR BLD AUTO: 28 %
MCH RBC QN AUTO: 29.6 PG (ref 26.6–33)
MCHC RBC AUTO-ENTMCNC: 33.2 G/DL (ref 31.5–35.7)
MCV RBC AUTO: 89 FL (ref 79–97)
MONOCYTES # BLD AUTO: 0.4 X10E3/UL (ref 0.1–0.9)
MONOCYTES NFR BLD AUTO: 4 %
N GONORRHOEA RRNA SPEC QL NAA+PROBE: NEGATIVE
NEGATIVE PREDICTIVE VALUE: NORMAL
NEUTROPHILS # BLD AUTO: 5.3 X10E3/UL (ref 1.4–7)
NEUTROPHILS NFR BLD AUTO: 60 %
NOTE: NORMAL
PERFORMANCE CHARACTERISTICS: NORMAL
PLATELET # BLD AUTO: 360 X10E3/UL (ref 150–450)
POSITIVE PREDICTIVE VALUE: NORMAL
RBC # BLD AUTO: 3.95 X10E6/UL (ref 3.77–5.28)
REF LAB TEST METHOD: NORMAL
RH BLD: POSITIVE
RPR SER QL: NON REACTIVE
RUBV IGG SERPL IA-ACNC: 1.7 INDEX
TEST PERFORMANCE INFO SPEC: NORMAL
WBC # BLD AUTO: 8.8 X10E3/UL (ref 3.4–10.8)

## 2024-05-02 ENCOUNTER — ROUTINE PRENATAL (OUTPATIENT)
Dept: OBSTETRICS AND GYNECOLOGY | Facility: CLINIC | Age: 34
End: 2024-05-02
Payer: COMMERCIAL

## 2024-05-02 VITALS — WEIGHT: 221 LBS | DIASTOLIC BLOOD PRESSURE: 80 MMHG | BODY MASS INDEX: 36.78 KG/M2 | SYSTOLIC BLOOD PRESSURE: 120 MMHG

## 2024-05-02 DIAGNOSIS — Z34.91 FIRST TRIMESTER PREGNANCY: Primary | ICD-10-CM

## 2024-05-02 RX ORDER — PROMETHAZINE HYDROCHLORIDE 25 MG/1
25 TABLET ORAL EVERY 6 HOURS PRN
Qty: 30 TABLET | Refills: 2 | Status: SHIPPED | OUTPATIENT
Start: 2024-05-02

## 2024-05-02 NOTE — PROGRESS NOTES
Chief Complaint   Patient presents with    Routine Prenatal Visit     Prenatal visit with no problems or concerns        HPI:   , 15w0d gestation reports doing well    ROS:  See Prenatal Episode/Flowsheet  /80   Wt 100 kg (221 lb)   LMP 2024   BMI 36.78 kg/m²      EXAM:  EXTREMITIES:  No swelling-See Prenatal Episode/Flowsheet    ABDOMEN:  FHTs/Movement noted-See Prenatal Episode/Flowsheet    URINE GLUCOSE/PROTEIN:  See Prenatal Episode/Flowsheet    PELVIC EXAM:  See Prenatal Episode/Flowsheet  CV:  Lungs:  GYN:    MDM:    Lab Results   Component Value Date    HGB 11.7 2024    RUBELLAABIGG 1.70 2024    HEPBSAG Negative 2024    ABORH O Positive 2021    ABO O 2024    RH Positive 2024    ABSCRN Negative 2024    SCM0USA3 Non Reactive 2024    HEPCVIRUSABY Non Reactive 2024    STREPGPB Negative 2022    URINECX No growth 2021       U/S:US Ob < 14 Weeks Single or First Gestation (2024 13:11)     1. IUP 15w0d  2. Routine care   3. NVP: phenergan 25mg po q6 hours  4. Anatomy 4 weeks  5. AMA: normal genetic testing

## 2024-05-28 ENCOUNTER — ROUTINE PRENATAL (OUTPATIENT)
Dept: OBSTETRICS AND GYNECOLOGY | Facility: CLINIC | Age: 34
End: 2024-05-28
Payer: COMMERCIAL

## 2024-05-28 VITALS — WEIGHT: 229.6 LBS | SYSTOLIC BLOOD PRESSURE: 118 MMHG | DIASTOLIC BLOOD PRESSURE: 80 MMHG | BODY MASS INDEX: 38.21 KG/M2

## 2024-05-28 DIAGNOSIS — Z36.89 ENCOUNTER FOR FETAL ANATOMIC SURVEY: Primary | ICD-10-CM

## 2024-05-28 DIAGNOSIS — R10.11 RUQ PAIN: ICD-10-CM

## 2024-05-28 PROCEDURE — 99213 OFFICE O/P EST LOW 20 MIN: CPT | Performed by: OBSTETRICS & GYNECOLOGY

## 2024-05-28 NOTE — PROGRESS NOTES
Chief Complaint   Patient presents with    Routine Prenatal Visit     Prenatal visit with Anatomy scan done today. Complains of pain in right chest going into back for about 2 weeks.        HPI:   , 18w5d gestation reports right chest and RUQ pain x 2 weeks-- persistent    ROS:  See Prenatal Episode/Flowsheet  /80   Wt 104 kg (229 lb 9.6 oz)   LMP 2024   BMI 38.21 kg/m²      EXAM:  EXTREMITIES:  No swelling-See Prenatal Episode/Flowsheet    ABDOMEN:  FHTs/Movement noted-See Prenatal Episode/Flowsheet    URINE GLUCOSE/PROTEIN:  See Prenatal Episode/Flowsheet    PELVIC EXAM:  See Prenatal Episode/Flowsheet  CV:  Lungs:  GYN:    MDM:    Lab Results   Component Value Date    HGB 11.7 2024    RUBELLAABIGG 1.70 2024    HEPBSAG Negative 2024    ABORH O Positive 2021    ABO O 2024    RH Positive 2024    ABSCRN Negative 2024    KBH1EXA5 Non Reactive 2024    HEPCVIRUSABY Non Reactive 2024    STREPGPB Negative 2022    URINECX No growth 2021       U/S: Normal anatomy.  Size consistent with dates.  Imaging limited secondary to fetal positioning.  Posterior placenta.  Three-vessel cord.    1. IUP 18w5d  2. Routine care   3.  Right upper quadrant pain with associated nausea.  The nausea been persistent throughout the pregnancy.  This pain has been about 2 weeks it is persistent at times severe.  Laboratory work including right upper quadrant ultrasound ordered stat. afp

## 2024-05-29 ENCOUNTER — HOSPITAL ENCOUNTER (EMERGENCY)
Facility: HOSPITAL | Age: 34
Discharge: HOME | End: 2024-05-29
Attending: EMERGENCY MEDICINE
Payer: COMMERCIAL

## 2024-05-29 ENCOUNTER — APPOINTMENT (OUTPATIENT)
Dept: GENERAL RADIOLOGY | Facility: HOSPITAL | Age: 34
End: 2024-05-29
Payer: COMMERCIAL

## 2024-05-29 ENCOUNTER — TELEPHONE (OUTPATIENT)
Dept: OBSTETRICS AND GYNECOLOGY | Facility: CLINIC | Age: 34
End: 2024-05-29
Payer: COMMERCIAL

## 2024-05-29 ENCOUNTER — HOSPITAL ENCOUNTER (OUTPATIENT)
Dept: ULTRASOUND IMAGING | Facility: HOSPITAL | Age: 34
Discharge: HOME | End: 2024-05-29
Admitting: OBSTETRICS & GYNECOLOGY
Payer: COMMERCIAL

## 2024-05-29 VITALS
HEIGHT: 64 IN | WEIGHT: 229 LBS | RESPIRATION RATE: 18 BRPM | BODY MASS INDEX: 39.09 KG/M2 | HEART RATE: 73 BPM | SYSTOLIC BLOOD PRESSURE: 110 MMHG | DIASTOLIC BLOOD PRESSURE: 66 MMHG | TEMPERATURE: 97.9 F | OXYGEN SATURATION: 100 %

## 2024-05-29 DIAGNOSIS — R07.9 CHEST PAIN, UNSPECIFIED TYPE: ICD-10-CM

## 2024-05-29 DIAGNOSIS — R10.11 RUQ PAIN: ICD-10-CM

## 2024-05-29 DIAGNOSIS — R10.11 RUQ PAIN: Primary | ICD-10-CM

## 2024-05-29 LAB
ALBUMIN SERPL-MCNC: 3.8 G/DL (ref 3.5–5.2)
ALBUMIN/GLOB SERPL: 1.2 G/DL
ALP SERPL-CCNC: 49 U/L (ref 39–117)
ALT SERPL W P-5'-P-CCNC: 12 U/L (ref 1–33)
ANION GAP SERPL CALCULATED.3IONS-SCNC: 12 MMOL/L (ref 5–15)
AST SERPL-CCNC: 15 U/L (ref 1–32)
BASOPHILS # BLD AUTO: 0.04 10*3/MM3 (ref 0–0.2)
BASOPHILS NFR BLD AUTO: 0.4 % (ref 0–1.5)
BILIRUB SERPL-MCNC: <0.2 MG/DL (ref 0–1.2)
BILIRUB UR QL STRIP: NEGATIVE
BUN SERPL-MCNC: 7 MG/DL (ref 6–20)
BUN/CREAT SERPL: 14.6 (ref 7–25)
CALCIUM SPEC-SCNC: 8.7 MG/DL (ref 8.6–10.5)
CHLORIDE SERPL-SCNC: 100 MMOL/L (ref 98–107)
CLARITY UR: CLEAR
CO2 SERPL-SCNC: 23 MMOL/L (ref 22–29)
COLOR UR: YELLOW
CREAT SERPL-MCNC: 0.48 MG/DL (ref 0.57–1)
D DIMER PPP FEU-MCNC: 0.42 MCGFEU/ML (ref 0–0.5)
DEPRECATED RDW RBC AUTO: 41.4 FL (ref 37–54)
EGFRCR SERPLBLD CKD-EPI 2021: 126.1 ML/MIN/1.73
EOSINOPHIL # BLD AUTO: 0.41 10*3/MM3 (ref 0–0.4)
EOSINOPHIL NFR BLD AUTO: 4.4 % (ref 0.3–6.2)
ERYTHROCYTE [DISTWIDTH] IN BLOOD BY AUTOMATED COUNT: 12.7 % (ref 12.3–15.4)
GEN 5 2HR TROPONIN T REFLEX: <6 NG/L
GLOBULIN UR ELPH-MCNC: 3.2 GM/DL
GLUCOSE SERPL-MCNC: 82 MG/DL (ref 65–99)
GLUCOSE UR STRIP-MCNC: NEGATIVE MG/DL
HCT VFR BLD AUTO: 33.4 % (ref 34–46.6)
HGB BLD-MCNC: 11.4 G/DL (ref 12–15.9)
HGB UR QL STRIP.AUTO: NEGATIVE
IMM GRANULOCYTES # BLD AUTO: 0.04 10*3/MM3 (ref 0–0.05)
IMM GRANULOCYTES NFR BLD AUTO: 0.4 % (ref 0–0.5)
KETONES UR QL STRIP: NEGATIVE
LEUKOCYTE ESTERASE UR QL STRIP.AUTO: NEGATIVE
LIPASE SERPL-CCNC: 45 U/L (ref 13–60)
LYMPHOCYTES # BLD AUTO: 2.35 10*3/MM3 (ref 0.7–3.1)
LYMPHOCYTES NFR BLD AUTO: 25.3 % (ref 19.6–45.3)
MAGNESIUM SERPL-MCNC: 1.7 MG/DL (ref 1.6–2.6)
MCH RBC QN AUTO: 30.5 PG (ref 26.6–33)
MCHC RBC AUTO-ENTMCNC: 34.1 G/DL (ref 31.5–35.7)
MCV RBC AUTO: 89.3 FL (ref 79–97)
MONOCYTES # BLD AUTO: 0.46 10*3/MM3 (ref 0.1–0.9)
MONOCYTES NFR BLD AUTO: 5 % (ref 5–12)
NEUTROPHILS NFR BLD AUTO: 5.99 10*3/MM3 (ref 1.7–7)
NEUTROPHILS NFR BLD AUTO: 64.5 % (ref 42.7–76)
NITRITE UR QL STRIP: NEGATIVE
NRBC BLD AUTO-RTO: 0 /100 WBC (ref 0–0.2)
PH UR STRIP.AUTO: 7.5 [PH] (ref 5–8)
PLATELET # BLD AUTO: 313 10*3/MM3 (ref 140–450)
PMV BLD AUTO: 9.6 FL (ref 6–12)
POTASSIUM SERPL-SCNC: 4.1 MMOL/L (ref 3.5–5.2)
PROCALCITONIN SERPL-MCNC: 0.03 NG/ML (ref 0–0.25)
PROT SERPL-MCNC: 7 G/DL (ref 6–8.5)
PROT UR QL STRIP: NEGATIVE
RBC # BLD AUTO: 3.74 10*6/MM3 (ref 3.77–5.28)
SODIUM SERPL-SCNC: 135 MMOL/L (ref 136–145)
SP GR UR STRIP: 1.01 (ref 1–1.03)
TROPONIN T DELTA: NORMAL
TROPONIN T SERPL HS-MCNC: <6 NG/L
UROBILINOGEN UR QL STRIP: NORMAL
WBC NRBC COR # BLD AUTO: 9.29 10*3/MM3 (ref 3.4–10.8)

## 2024-05-29 PROCEDURE — 85379 FIBRIN DEGRADATION QUANT: CPT

## 2024-05-29 PROCEDURE — 83690 ASSAY OF LIPASE: CPT

## 2024-05-29 PROCEDURE — 84145 PROCALCITONIN (PCT): CPT

## 2024-05-29 PROCEDURE — 85025 COMPLETE CBC W/AUTO DIFF WBC: CPT

## 2024-05-29 PROCEDURE — 84484 ASSAY OF TROPONIN QUANT: CPT

## 2024-05-29 PROCEDURE — 99283 EMERGENCY DEPT VISIT LOW MDM: CPT

## 2024-05-29 PROCEDURE — 93005 ELECTROCARDIOGRAM TRACING: CPT

## 2024-05-29 PROCEDURE — 81003 URINALYSIS AUTO W/O SCOPE: CPT

## 2024-05-29 PROCEDURE — 76705 ECHO EXAM OF ABDOMEN: CPT

## 2024-05-29 PROCEDURE — 83735 ASSAY OF MAGNESIUM: CPT

## 2024-05-29 PROCEDURE — 36415 COLL VENOUS BLD VENIPUNCTURE: CPT

## 2024-05-29 PROCEDURE — 80053 COMPREHEN METABOLIC PANEL: CPT

## 2024-05-29 NOTE — TELEPHONE ENCOUNTER
Pt had an US gallbladder today & was in the ED. The US & EKG were normal. The pt said she is still having pain in the right rib/ back area.     Please advise pt.

## 2024-05-29 NOTE — PROGRESS NOTES
Patient's gallbladder ultrasound is unremarkable.  With the significance of her pain and discomfort I would recommend she goes to the ER for EKG and Cardiologic evaluation as a neck step.  Unfortunately this may take a little time but it is the quickest for evaluation as this ultrasound is negative

## 2024-05-29 NOTE — DISCHARGE INSTRUCTIONS
Please follow-up with your primary care provider and your OB/GYN within the next 2 to 3 days for further evaluation    Please return to the ER for any acute changes or worsening of your condition.

## 2024-05-29 NOTE — ED PROVIDER NOTES
" EMERGENCY DEPARTMENT ENCOUNTER    Pt Name: Tea Nelson  MRN: [id number]  Pt :   1990  Room Number:  02SF/02  Date of encounter:  2024  PCP: Provider, No Known  ED Provider: Shlomo Strauss PA-C    Historian: Patient,  at bedside, nursing notes      HPI:  Chief Complaint: Right upper quadrant abdominal pain and right-sided chest pain        Context: Tea Nelson is a 34 y.o. female who presents to the ED c/o constant daily pain for the past 2 weeks in her right-sided chest and right upper quadrant of her abdomen.  Patient states pain started suddenly and has been constant since onset.  Patient reports she is currently 19 weeks pregnant and was at her OB/GYN's office today she had a right upper quadrant ultrasound of her gallbladder which was negative and her OB/GYN wanted her to present to the ER for \"cardiac workup\".  Patient denies any shortness of breath, lightheadedness, dizziness, syncope, vision changes, numbness or tingling, or any other complaint today.      PAST MEDICAL HISTORY  Past Medical History:   Diagnosis Date    Patient denies medical problems          PAST SURGICAL HISTORY  Past Surgical History:   Procedure Laterality Date    LAPAROSCOPIC EXPLORATION FOR ECTOPIC PREGNANCY N/A 2021    Procedure: LAPAROSCOPIC EXPLORATION FOR ECTOPIC PREGNANCY, RIGHT SALPINGECTOMY;  Surgeon: Deloris Leon MD;  Location: [facility];  Service: Obstetrics/Gynecology;  Laterality: N/A;    NO PAST SURGERIES      TONSILLECTOMY AND ADENOIDECTOMY           FAMILY HISTORY  History reviewed. No pertinent family history.      SOCIAL HISTORY  Social History     Socioeconomic History    Marital status:      Spouse name: rosangela    Number of children: 3    Highest education level: Bachelor's degree (e.g., BA, AB, BS)   Tobacco Use    Smoking status: Never    Smokeless tobacco: Never   Vaping Use    Vaping status: Never Used   Substance and Sexual Activity    Alcohol use: No    Drug use: No    " Sexual activity: Yes     Partners: Male     Birth control/protection: None         ALLERGIES  Ceclor [cefaclor]        REVIEW OF SYSTEMS  Review of Systems   Constitutional:  Negative for chills and fever.   HENT:  Negative for congestion and sore throat.    Respiratory:  Negative for cough and shortness of breath.    Cardiovascular:  Positive for chest pain.   Gastrointestinal:  Positive for abdominal pain. Negative for nausea and vomiting.   Genitourinary:  Negative for dysuria.   Musculoskeletal:  Negative for back pain.   Skin:  Negative for wound.   Neurological:  Negative for dizziness and headaches.   Psychiatric/Behavioral:  Negative for confusion.    All other systems reviewed and are negative.         All systems reviewed and negative except for those discussed in HPI.       PHYSICAL EXAM    I have reviewed the triage vital signs and nursing notes.    ED Triage Vitals [05/29/24 1140]   Temp Heart Rate Resp BP SpO2   97.9 °F (36.6 °C) 73 18 137/65 100 %      Temp src Heart Rate Source Patient Position BP Location FiO2 (%)   Oral Monitor Sitting Left arm --       Physical Exam  Vitals and nursing note reviewed.   Constitutional:       General: She is not in acute distress.     Appearance: She is not ill-appearing, toxic-appearing or diaphoretic.   HENT:      Head: Normocephalic and atraumatic.      Mouth/Throat:      Mouth: Mucous membranes are moist.      Pharynx: Oropharynx is clear.   Eyes:      Extraocular Movements: Extraocular movements intact.   Cardiovascular:      Rate and Rhythm: Normal rate.      Heart sounds: Normal heart sounds.   Pulmonary:      Effort: Pulmonary effort is normal. No respiratory distress.      Breath sounds: Normal breath sounds.   Chest:       Abdominal:      Tenderness: There is abdominal tenderness in the right upper quadrant.   Skin:     General: Skin is warm and dry.      Findings: No rash.   Neurological:      Mental Status: She is alert.             LAB RESULTS  Recent  Results (from the past 24 hour(s))   Comprehensive Metabolic Panel    Collection Time: 05/29/24 12:39 PM    Specimen: Blood   Result Value Ref Range    Glucose 82 65 - 99 mg/dL    BUN 7 6 - 20 mg/dL    Creatinine 0.48 (L) 0.57 - 1.00 mg/dL    Sodium 135 (L) 136 - 145 mmol/L    Potassium 4.1 3.5 - 5.2 mmol/L    Chloride 100 98 - 107 mmol/L    CO2 23.0 22.0 - 29.0 mmol/L    Calcium 8.7 8.6 - 10.5 mg/dL    Total Protein 7.0 6.0 - 8.5 g/dL    Albumin 3.8 3.5 - 5.2 g/dL    ALT (SGPT) 12 1 - 33 U/L    AST (SGOT) 15 1 - 32 U/L    Alkaline Phosphatase 49 39 - 117 U/L    Total Bilirubin <0.2 0.0 - 1.2 mg/dL    Globulin 3.2 gm/dL    A/G Ratio 1.2 g/dL    BUN/Creatinine Ratio 14.6 7.0 - 25.0    Anion Gap 12.0 5.0 - 15.0 mmol/L    eGFR 126.1 >60.0 mL/min/1.73   Lipase    Collection Time: 05/29/24 12:39 PM    Specimen: Blood   Result Value Ref Range    Lipase 45 13 - 60 U/L   CBC Auto Differential    Collection Time: 05/29/24 12:39 PM    Specimen: Blood   Result Value Ref Range    WBC 9.29 3.40 - 10.80 10*3/mm3    RBC 3.74 (L) 3.77 - 5.28 10*6/mm3    Hemoglobin 11.4 (L) 12.0 - 15.9 g/dL    Hematocrit 33.4 (L) 34.0 - 46.6 %    MCV 89.3 79.0 - 97.0 fL    MCH 30.5 26.6 - 33.0 pg    MCHC 34.1 31.5 - 35.7 g/dL    RDW 12.7 12.3 - 15.4 %    RDW-SD 41.4 37.0 - 54.0 fl    MPV 9.6 6.0 - 12.0 fL    Platelets 313 140 - 450 10*3/mm3    Neutrophil % 64.5 42.7 - 76.0 %    Lymphocyte % 25.3 19.6 - 45.3 %    Monocyte % 5.0 5.0 - 12.0 %    Eosinophil % 4.4 0.3 - 6.2 %    Basophil % 0.4 0.0 - 1.5 %    Immature Grans % 0.4 0.0 - 0.5 %    Neutrophils, Absolute 5.99 1.70 - 7.00 10*3/mm3    Lymphocytes, Absolute 2.35 0.70 - 3.10 10*3/mm3    Monocytes, Absolute 0.46 0.10 - 0.90 10*3/mm3    Eosinophils, Absolute 0.41 (H) 0.00 - 0.40 10*3/mm3    Basophils, Absolute 0.04 0.00 - 0.20 10*3/mm3    Immature Grans, Absolute 0.04 0.00 - 0.05 10*3/mm3    nRBC 0.0 0.0 - 0.2 /100 WBC   High Sensitivity Troponin T    Collection Time: 05/29/24 12:39 PM    Specimen:  Blood   Result Value Ref Range    HS Troponin T <6 <14 ng/L   Procalcitonin    Collection Time: 05/29/24 12:39 PM    Specimen: Blood   Result Value Ref Range    Procalcitonin 0.03 0.00 - 0.25 ng/mL   Magnesium    Collection Time: 05/29/24 12:39 PM    Specimen: Blood   Result Value Ref Range    Magnesium 1.7 1.6 - 2.6 mg/dL   Urinalysis With Culture If Indicated - Urine, Clean Catch    Collection Time: 05/29/24 12:49 PM    Specimen: Urine, Clean Catch   Result Value Ref Range    Color, UA Yellow Yellow, Straw    Appearance, UA Clear Clear    pH, UA 7.5 5.0 - 8.0    Specific Gravity, UA 1.010 1.005 - 1.030    Glucose, UA Negative Negative    Ketones, UA Negative Negative    Bilirubin, UA Negative Negative    Blood, UA Negative Negative    Protein, UA Negative Negative    Leuk Esterase, UA Negative Negative    Nitrite, UA Negative Negative    Urobilinogen, UA 0.2 E.U./dL 0.2 - 1.0 E.U./dL   High Sensitivity Troponin T 2Hr    Collection Time: 05/29/24  2:58 PM    Specimen: Blood   Result Value Ref Range    HS Troponin T <6 <14 ng/L    Troponin T Delta     D-dimer, Quantitative    Collection Time: 05/29/24  2:58 PM    Specimen: Blood   Result Value Ref Range    D-Dimer, Quantitative 0.42 0.00 - 0.50 MCGFEU/mL       If labs were ordered, I independently reviewed the results and considered them in treating the patient.        RADIOLOGY  US Gallbladder    Result Date: 5/29/2024  RIGHT UPPER QUADRANT ULTRASOUND  HISTORY: RUQ pain; R10.11-Right upper quadrant pain  COMPARISON: None.  PROCEDURE: Ultrasound images of the right upper quadrant were obtained.  FINDINGS:  The liver parenchyma is of proper echogenicity.  There is no focal abnormality. The gallbladder is proper size with no wall thickening or pericholecystic fluid. There are  no gallstones. No evidence of ductal dilatation is identified. Limited images of the pancreas are  obscured by bowel gas. Right kidney measures 12.4 cm and appears normal. [  ]      Unremarkable  right upper quadrant ultrasound.    This report was signed and finalized on 5/29/2024 9:25 AM by Shwetha Keyes MD.     Chest x-ray was ordered but patient refused      PROCEDURES    Procedures    ECG 12 Lead Chest Pain   Final Result          MEDICATIONS GIVEN IN ER    Medications - No data to display      MEDICAL DECISION MAKING, PROGRESS, and CONSULTS    All labs, if obtained, have been independently reviewed by me.  All radiology studies, if obtained, have been reviewed by me and the radiologist dictating the report.  All EKG's, if obtained, have been independently viewed and interpreted by me/my attending physician.      Discussion below represents my analysis of pertinent findings related to patient's condition, differential diagnosis, treatment plan and final disposition.    Rest of medical decision making documented in the ED course.  Patient refused chest x-ray                     Differential diagnosis:    Differential diagnosis included but was not limited to costochondritis, pregnancy complication, PE, ACS, pneumonia, gastritis, GERD, constipation, among others    - Discussed/ obtained information from independent historians:      - External (non-ED) record review: OB/GYN and primary care records    - Chronic or social conditions impacting care:      Orders placed during this visit:  Orders Placed This Encounter   Procedures    Comprehensive Metabolic Panel    Lipase    Urinalysis With Culture If Indicated -    CBC Auto Differential    High Sensitivity Troponin T    Procalcitonin    Magnesium    High Sensitivity Troponin T 2Hr    D-dimer, Quantitative    ECG 12 Lead Chest Pain    CBC & Differential         Additional orders considered but not ordered: Considered a chest x-ray but patient refused      ED Course:    Consultants: ED attending Dr. Hunt    ED Course as of 05/29/24 2044   Wed May 29, 2024   3223 I have reviewed the mid-level provider(s) note and verbally discussed the case/plan of care.  I  was available for consultation as needed at all times during the patient's visit in the Emergency Department.  I agree with the clinical impression, plan, and disposition unless otherwise stated in the MDM below.    ATTENDING ATTESTATION  HPI: 34-year-old female currently 19 weeks pregnant with IUP who presents with right-sided chest pain/right upper quadrant abdominal pain x 2 weeks.    MDM: ED workup reviewed. EKG per my interpretation normal sinus rhythm, rate 80, normal axis, no ST segment elevation or depression, normal QRS and QTc intervals.  Labs reviewed.  Hemoglobin 9.4.  Otherwise, CBC, CMP, lipase, troponin, D-dimer, UA clinically unremarkable.  Patient refused chest x-ray.     [JS]   1550 Patient referred to the emergency department by OB/GYN over concern for 2-week history of right-sided chest pain and right upper quadrant abdominal pain.  Of note patient had a right upper quadrant ultrasound which showed a normal-appearing gallbladder and no other acute findings which resulted today [TG]   1550 Patient's vital signs as interpreted by me are stable on arrival.  Oxygen saturation 100% on room air heart rate of 73 temperature 97.9 blood pressure 137/65 [TG]   1550 Patient is in no acute distress on my physical exam sitting comfortably in the exam chair. [TG]   1550 CBC & Differential(!)  CBC showed no leukocytosis.  Hemoglobin 11.4, hematocrit 33.4 [TG]   1551 Comprehensive Metabolic Panel(!)  CMP nonactionable with normal renal function and liver enzymes [TG]   1551 Lipase  Lipase normal [TG]   1551 Magnesium  Magnesium normal [TG]   1551 EKG showed no concerning signs for ischemic changes per ED attending interpretation [TG]   1551 High Sensitivity Troponin T  Initial high-sensitivity troponin less than 6.  Significantly lowers any suspicion for acute coronary syndrome [TG]   1551 Procalcitonin  Pro-Calc normal [TG]   1551 High Sensitivity Troponin T 2Hr  Repeat 2 hours later high-sensitivity troponin  also less than 6 no suspicion for acute coronary syndrome at this time [TG]   1551 D-dimer, Quantitative  D-dimer is within normal limits significantly lowering any suspicion for pulmonary embolism [TG]   1552 Urinalysis With Culture If Indicated - Urine, Clean Catch  Urinalysis unremarkable [TG]   1552 Chest x-ray was ordered but patient refused I did tell patient we wanted to rule out pneumothorax, pneumonia, among other causes of her chest pain but she still refused despite being offered shielding [TG]   1552 Will discharge patient home in stable condition to follow-up on an outpatient basis with her OB/GYN very strict ED return precautions were explained and she verbalized understanding of and agreement with this plan.  I explained that we had not elucidated a clear etiology of her chest or right upper quadrant abdominal pain and that it would be very important for her to return for any continued or worsening symptoms.  Patient verbalized understanding of and agreement with plan [TG]      ED Course User Index  [JS] Petr Hunt DO  [TG] Shlomo Strauss PA-C              Shared Decision Making:  After my consideration of clinical presentation and any laboratory/radiology studies obtained, I discussed the findings with the patient/patient representative who is in agreement with the treatment plan and the final disposition.   Risks and benefits of discharge and/or observation/admission were discussed.       AS OF 20:44 EDT VITALS:    BP - 110/66  HR - 73  TEMP - 97.9 °F (36.6 °C) (Oral)  O2 SATS - 100%                  DIAGNOSIS  Final diagnoses:   RUQ pain   Chest pain, unspecified type         DISPOSITION  Discharge home      Please note that portions of this document were completed with voice recognition software.      Shlomo Strauss PA-C  05/29/24 2042

## 2024-05-30 RX ORDER — CYCLOBENZAPRINE HCL 10 MG
10 TABLET ORAL 3 TIMES DAILY PRN
Qty: 30 TABLET | Refills: 1 | Status: SHIPPED | OUTPATIENT
Start: 2024-05-30 | End: 2024-06-09

## 2024-05-30 NOTE — TELEPHONE ENCOUNTER
Cardio workup negative.  Reassuring that there is no heart issue at this time.  This may simply be musculoskeletal as the gallbladder is also been ruled out.  I have sent in Flexeril and mild and safe muscle relaxant for patient to try 3 times a day as needed

## 2024-06-03 LAB
AFP INTERP SERPL-IMP: NORMAL
AFP INTERP SERPL-IMP: NORMAL
AFP MOM SERPL: 0.96
AFP SERPL-MCNC: 36.2 NG/ML
AGE AT DELIVERY: 36.4 YR
ALBUMIN SERPL-MCNC: 3.7 G/DL (ref 3.9–4.9)
ALBUMIN/GLOB SERPL: 1.3 {RATIO} (ref 1.2–2.2)
ALP SERPL-CCNC: 47 IU/L (ref 44–121)
ALT SERPL-CCNC: 12 IU/L (ref 0–32)
AMYLASE SERPL-CCNC: 75 U/L (ref 31–110)
AST SERPL-CCNC: 15 IU/L (ref 0–40)
BASOPHILS # BLD AUTO: 0.1 X10E3/UL (ref 0–0.2)
BASOPHILS NFR BLD AUTO: 1 %
BILIRUB SERPL-MCNC: <0.2 MG/DL (ref 0–1.2)
BUN SERPL-MCNC: 6 MG/DL (ref 6–20)
BUN/CREAT SERPL: 12 (ref 9–23)
CALCIUM SERPL-MCNC: 8.8 MG/DL (ref 8.7–10.2)
CHLORIDE SERPL-SCNC: 102 MMOL/L (ref 96–106)
CO2 SERPL-SCNC: 25 MMOL/L (ref 20–29)
CREAT SERPL-MCNC: 0.52 MG/DL (ref 0.57–1)
EGFRCR SERPLBLD CKD-EPI 2021: 123 ML/MIN/1.73
EOSINOPHIL # BLD AUTO: 0.5 X10E3/UL (ref 0–0.4)
EOSINOPHIL NFR BLD AUTO: 6 %
ERYTHROCYTE [DISTWIDTH] IN BLOOD BY AUTOMATED COUNT: 12.2 % (ref 11.7–15.4)
GA METHOD: NORMAL
GA: 18.7 WEEKS
GLOBULIN SER CALC-MCNC: 2.8 G/DL (ref 1.5–4.5)
GLUCOSE SERPL-MCNC: 90 MG/DL (ref 70–99)
HCT VFR BLD AUTO: 32.7 % (ref 34–46.6)
HGB BLD-MCNC: 10.9 G/DL (ref 11.1–15.9)
IDDM PATIENT QL: NO
IMM GRANULOCYTES # BLD AUTO: 0 X10E3/UL (ref 0–0.1)
IMM GRANULOCYTES NFR BLD AUTO: 0 %
LABORATORY COMMENT REPORT: NORMAL
LIPASE SERPL-CCNC: 34 U/L (ref 14–72)
LYMPHOCYTES # BLD AUTO: 2.2 X10E3/UL (ref 0.7–3.1)
LYMPHOCYTES NFR BLD AUTO: 26 %
MCH RBC QN AUTO: 30.1 PG (ref 26.6–33)
MCHC RBC AUTO-ENTMCNC: 33.3 G/DL (ref 31.5–35.7)
MCV RBC AUTO: 90 FL (ref 79–97)
MONOCYTES # BLD AUTO: 0.4 X10E3/UL (ref 0.1–0.9)
MONOCYTES NFR BLD AUTO: 4 %
MULTIPLE PREGNANCY: NO
NEURAL TUBE DEFECT RISK FETUS: NORMAL %
NEUTROPHILS # BLD AUTO: 5.3 X10E3/UL (ref 1.4–7)
NEUTROPHILS NFR BLD AUTO: 63 %
PLATELET # BLD AUTO: 320 X10E3/UL (ref 150–450)
POTASSIUM SERPL-SCNC: 4.3 MMOL/L (ref 3.5–5.2)
PROT SERPL-MCNC: 6.5 G/DL (ref 6–8.5)
RBC # BLD AUTO: 3.62 X10E6/UL (ref 3.77–5.28)
RESULT: NORMAL
SODIUM SERPL-SCNC: 136 MMOL/L (ref 134–144)
WBC # BLD AUTO: 8.5 X10E3/UL (ref 3.4–10.8)

## 2024-06-25 ENCOUNTER — ROUTINE PRENATAL (OUTPATIENT)
Dept: OBSTETRICS AND GYNECOLOGY | Facility: CLINIC | Age: 34
End: 2024-06-25
Payer: COMMERCIAL

## 2024-06-25 VITALS — WEIGHT: 229 LBS | DIASTOLIC BLOOD PRESSURE: 78 MMHG | SYSTOLIC BLOOD PRESSURE: 118 MMHG | BODY MASS INDEX: 39.31 KG/M2

## 2024-06-25 DIAGNOSIS — N94.9 ROUND LIGAMENT PAIN: ICD-10-CM

## 2024-06-25 DIAGNOSIS — O09.522 MULTIGRAVIDA OF ADVANCED MATERNAL AGE IN SECOND TRIMESTER: Primary | ICD-10-CM

## 2024-06-25 PROCEDURE — 99214 OFFICE O/P EST MOD 30 MIN: CPT | Performed by: MIDWIFE

## 2024-06-25 RX ORDER — FERROUS SULFATE 324(65)MG
324 TABLET, DELAYED RELEASE (ENTERIC COATED) ORAL
Qty: 30 TABLET | Refills: 6 | Status: SHIPPED | OUTPATIENT
Start: 2024-06-25

## 2024-06-25 NOTE — PROGRESS NOTES
Chief Complaint   Patient presents with    Routine Prenatal Visit     Follow up anatomy scan done today       HPI: Tea is a  currently at 22w5d here for prenatal visit who reports the following: baby is active. She has noticed an increase in pelvic pressure and lower abdominal discomfort. She also c/o fatigue.                EXAM:     Vitals:    24 1124   BP: 118/78      Abdomen:   See prenatal flowsheet as noted and reviewed, soft, nontender   Pelvic:  See prenatal flowsheet as noted and reviewed   Urine:  See prenatal flowsheet as noted and reviewed    Lab Results   Component Value Date    ABORH O Positive 2021    ABO O 2024    RH Positive 2024    ABSCRN Negative 2024       MDM:  Impression: Supervision of high risk pregnancy  AMA - cfDNA normal  Anemia in pregnancy  Round ligament pain   Tests done today: U/S to complete the anatomic screening - anatomy completely seen today   Topics discussed: kick counts and fetal movement  Maternity belt  Rx: Ferrous sulfate daily  Reviewed OB labs   Tests next visit: GCT  HgB                RTO:                        4 weeks    This note was electronically signed.  Georgia Ortiz, APRN  2024

## 2024-07-23 ENCOUNTER — ROUTINE PRENATAL (OUTPATIENT)
Dept: OBSTETRICS AND GYNECOLOGY | Facility: CLINIC | Age: 34
End: 2024-07-23
Payer: COMMERCIAL

## 2024-07-23 VITALS — WEIGHT: 235.8 LBS | SYSTOLIC BLOOD PRESSURE: 120 MMHG | BODY MASS INDEX: 40.47 KG/M2 | DIASTOLIC BLOOD PRESSURE: 74 MMHG

## 2024-07-23 DIAGNOSIS — Z3A.26 26 WEEKS GESTATION OF PREGNANCY: Primary | ICD-10-CM

## 2024-07-23 LAB
BASOPHILS # BLD AUTO: 0.05 10*3/MM3 (ref 0–0.2)
BASOPHILS NFR BLD AUTO: 0.5 % (ref 0–1.5)
EOSINOPHIL # BLD AUTO: 0.61 10*3/MM3 (ref 0–0.4)
EOSINOPHIL NFR BLD AUTO: 6.1 % (ref 0.3–6.2)
ERYTHROCYTE [DISTWIDTH] IN BLOOD BY AUTOMATED COUNT: 12 % (ref 12.3–15.4)
GLUCOSE 1H P 50 G GLC PO SERPL-MCNC: 169 MG/DL (ref 65–139)
HCT VFR BLD AUTO: 32.3 % (ref 34–46.6)
HGB BLD-MCNC: 10.8 G/DL (ref 12–15.9)
IMM GRANULOCYTES # BLD AUTO: 0.05 10*3/MM3 (ref 0–0.05)
IMM GRANULOCYTES NFR BLD AUTO: 0.5 % (ref 0–0.5)
LYMPHOCYTES # BLD AUTO: 1.92 10*3/MM3 (ref 0.7–3.1)
LYMPHOCYTES NFR BLD AUTO: 19.4 % (ref 19.6–45.3)
MCH RBC QN AUTO: 30.9 PG (ref 26.6–33)
MCHC RBC AUTO-ENTMCNC: 33.4 G/DL (ref 31.5–35.7)
MCV RBC AUTO: 92.3 FL (ref 79–97)
MONOCYTES # BLD AUTO: 0.46 10*3/MM3 (ref 0.1–0.9)
MONOCYTES NFR BLD AUTO: 4.6 % (ref 5–12)
NEUTROPHILS # BLD AUTO: 6.83 10*3/MM3 (ref 1.7–7)
NEUTROPHILS NFR BLD AUTO: 68.9 % (ref 42.7–76)
NRBC BLD AUTO-RTO: 0 /100 WBC (ref 0–0.2)
PLATELET # BLD AUTO: 332 10*3/MM3 (ref 140–450)
RBC # BLD AUTO: 3.5 10*6/MM3 (ref 3.77–5.28)
WBC # BLD AUTO: 9.92 10*3/MM3 (ref 3.4–10.8)

## 2024-07-23 PROCEDURE — 99213 OFFICE O/P EST LOW 20 MIN: CPT | Performed by: OBSTETRICS & GYNECOLOGY

## 2024-07-23 NOTE — PROGRESS NOTES
Chief Complaint   Patient presents with    Routine Prenatal Visit     Prenatal visit with Glucola done today. No problems or concerns        HPI:   , 26w5d gestation reports doing well    ROS:  See Prenatal Episode/Flowsheet  /74   Wt 107 kg (235 lb 12.8 oz)   LMP 2024   BMI 40.47 kg/m²      EXAM:  EXTREMITIES:  No swelling-See Prenatal Episode/Flowsheet    ABDOMEN:  FHTs/Movement noted-See Prenatal Episode/Flowsheet    URINE GLUCOSE/PROTEIN:  See Prenatal Episode/Flowsheet    PELVIC EXAM:  See Prenatal Episode/Flowsheet  CV:  Lungs:  GYN:    MDM:    Lab Results   Component Value Date    HGB 11.4 (L) 2024    RUBELLAABIGG 1.70 2024    HEPBSAG Negative 2024    ABORH O Positive 2021    ABO O 2024    RH Positive 2024    ABSCRN Negative 2024    [id number] Non Reactive 2024    HEPCVIRUSABY Non Reactive 2024    STREPGPB Negative 2022    URINECX No growth 2021       U/S:US Ob 14 + Weeks Single or First Gestation (2024 11:29)     1. IUP 26w5d  2. Routine care   3. Glucola done

## 2024-07-24 NOTE — PROGRESS NOTES
Patient is 3-hour GTT within the next week.  She failed her sugar test.  Please make sure she is taking her iron twice a day as well.  Thank you

## 2024-07-30 DIAGNOSIS — R73.09 ABNORMAL GTT (GLUCOSE TOLERANCE TEST): Primary | ICD-10-CM

## 2024-08-01 LAB
GLUCOSE 1H P 100 G GLC PO SERPL-MCNC: 187 MG/DL (ref 70–179)
GLUCOSE 2H P 100 G GLC PO SERPL-MCNC: 147 MG/DL (ref 70–154)
GLUCOSE 3H P 100 G GLC PO SERPL-MCNC: 66 MG/DL (ref 70–139)
GLUCOSE P FAST SERPL-MCNC: 85 MG/DL (ref 70–94)
Lab: ABNORMAL

## 2024-08-02 NOTE — PROGRESS NOTES
Passed 3-hour sugar test but 1 level was elevated therefore patient really needs to work on diet and avoidance of carbohydrates and excessive sweets and calories.  Thank you

## 2024-08-13 ENCOUNTER — ROUTINE PRENATAL (OUTPATIENT)
Dept: OBSTETRICS AND GYNECOLOGY | Facility: CLINIC | Age: 34
End: 2024-08-13
Payer: COMMERCIAL

## 2024-08-13 VITALS — BODY MASS INDEX: 41.68 KG/M2 | DIASTOLIC BLOOD PRESSURE: 66 MMHG | SYSTOLIC BLOOD PRESSURE: 100 MMHG | WEIGHT: 242.8 LBS

## 2024-08-13 DIAGNOSIS — Z30.2 REQUEST FOR STERILIZATION: ICD-10-CM

## 2024-08-13 DIAGNOSIS — Z34.93 PRENATAL CARE IN THIRD TRIMESTER: Primary | ICD-10-CM

## 2024-08-13 DIAGNOSIS — O09.522 MULTIGRAVIDA OF ADVANCED MATERNAL AGE IN SECOND TRIMESTER: ICD-10-CM

## 2024-08-13 DIAGNOSIS — O09.899 SHORT INTERVAL BETWEEN PREGNANCIES AFFECTING PREGNANCY, ANTEPARTUM: ICD-10-CM

## 2024-08-13 DIAGNOSIS — E88.819 INSULIN RESISTANCE: ICD-10-CM

## 2024-08-13 DIAGNOSIS — R53.83 OTHER FATIGUE: ICD-10-CM

## 2024-08-13 RX ORDER — BLOOD-GLUCOSE METER
1 KIT MISCELLANEOUS AS NEEDED
Qty: 1 EACH | Refills: 0 | Status: SHIPPED | OUTPATIENT
Start: 2024-08-13

## 2024-08-13 RX ORDER — LANCETS
EACH MISCELLANEOUS
Qty: 100 EACH | Refills: 12 | Status: SHIPPED | OUTPATIENT
Start: 2024-08-13

## 2024-08-14 PROBLEM — Z30.2 REQUEST FOR STERILIZATION: Status: ACTIVE | Noted: 2024-08-14

## 2024-08-14 PROBLEM — O09.899 SHORT INTERVAL BETWEEN PREGNANCIES AFFECTING PREGNANCY, ANTEPARTUM: Status: ACTIVE | Noted: 2024-08-14

## 2024-08-14 PROBLEM — E88.819 INSULIN RESISTANCE: Status: ACTIVE | Noted: 2024-08-14

## 2024-08-14 LAB
BASOPHILS # BLD AUTO: 0.1 X10E3/UL (ref 0–0.2)
BASOPHILS NFR BLD AUTO: 1 %
EOSINOPHIL # BLD AUTO: 0.6 X10E3/UL (ref 0–0.4)
EOSINOPHIL NFR BLD AUTO: 5 %
ERYTHROCYTE [DISTWIDTH] IN BLOOD BY AUTOMATED COUNT: 12.1 % (ref 11.7–15.4)
HCT VFR BLD AUTO: 35.3 % (ref 34–46.6)
HGB BLD-MCNC: 11.6 G/DL (ref 11.1–15.9)
IMM GRANULOCYTES # BLD AUTO: 0 X10E3/UL (ref 0–0.1)
IMM GRANULOCYTES NFR BLD AUTO: 0 %
LYMPHOCYTES # BLD AUTO: 2.2 X10E3/UL (ref 0.7–3.1)
LYMPHOCYTES NFR BLD AUTO: 19 %
MCH RBC QN AUTO: 30.9 PG (ref 26.6–33)
MCHC RBC AUTO-ENTMCNC: 32.9 G/DL (ref 31.5–35.7)
MCV RBC AUTO: 94 FL (ref 79–97)
MONOCYTES # BLD AUTO: 0.4 X10E3/UL (ref 0.1–0.9)
MONOCYTES NFR BLD AUTO: 4 %
NEUTROPHILS # BLD AUTO: 8 X10E3/UL (ref 1.4–7)
NEUTROPHILS NFR BLD AUTO: 71 %
PLATELET # BLD AUTO: 369 X10E3/UL (ref 150–450)
RBC # BLD AUTO: 3.75 X10E6/UL (ref 3.77–5.28)
TSH SERPL DL<=0.005 MIU/L-ACNC: 0.84 UIU/ML (ref 0.45–4.5)
WBC # BLD AUTO: 11.3 X10E3/UL (ref 3.4–10.8)

## 2024-08-14 NOTE — PROGRESS NOTES
Prenatal Care Visit    Subjective   Chief Complaint   Patient presents with    Routine Prenatal Visit     Prenatal visit with no problems or concerns.       History:   Tea is a  currently at 29w5d who presents for a prenatal care visit today.    She reports worsening fatigue.  She is concerned about her glucose values.    Social History    Tobacco Use      Smoking status: Never      Smokeless tobacco: Never       Objective   /66   Wt 110 kg (242 lb 12.8 oz)   LMP 2024   BMI 41.68 kg/m²   Physical Exam:  Normal, gestational age-appropriate exam today        Plan   Medical Decision Making:    I have reviewed the prenatal labs and ultrasound(s) today. I have reviewed the most recent prenatal progress note(s).    Diagnosis: Supervision of high risk pregnancy  AMA, NIPS NEG  Short interval pregnancy  H/O ectopic pregnancy requiring R salpingectomy  Fatigue  Insulin resistance  BMI 42  Request for permanent sterilization   Tests/Orders/Rx today: Orders Placed This Encounter   Procedures    TSH     Order Specific Question:   Release to patient     Answer:   Routine Release [[id number]]    CBC & Differential     Order Specific Question:   Manual Differential     Answer:   No     Order Specific Question:   Release to patient     Answer:   Routine Release [[id number]]       Medication Management: Glucose testing supplies     Topics discussed: Prenatal care milestones  kick counts and fetal movement  PIH precautions   labor signs and symptoms  Insulin resistance/diet/testing supplies  Tubal r/b/a   Tests next visit: none   Next visit: 1 week(s)     Thong Culver MD  Obstetrics and Gynecology  [facility]

## 2024-08-20 ENCOUNTER — ROUTINE PRENATAL (OUTPATIENT)
Dept: OBSTETRICS AND GYNECOLOGY | Facility: CLINIC | Age: 34
End: 2024-08-20
Payer: COMMERCIAL

## 2024-08-20 VITALS — SYSTOLIC BLOOD PRESSURE: 120 MMHG | WEIGHT: 237.2 LBS | BODY MASS INDEX: 40.72 KG/M2 | DIASTOLIC BLOOD PRESSURE: 70 MMHG

## 2024-08-20 DIAGNOSIS — O99.810 ABNORMAL GLUCOSE TOLERANCE TEST IN PREGNANCY: ICD-10-CM

## 2024-08-20 DIAGNOSIS — O09.523 ADVANCED MATERNAL AGE IN MULTIGRAVIDA, THIRD TRIMESTER: ICD-10-CM

## 2024-08-20 DIAGNOSIS — Z34.93 PRENATAL CARE IN THIRD TRIMESTER: Primary | ICD-10-CM

## 2024-08-20 DIAGNOSIS — Z87.59 HISTORY OF ECTOPIC PREGNANCY: ICD-10-CM

## 2024-08-20 DIAGNOSIS — Z30.2 REQUEST FOR STERILIZATION: ICD-10-CM

## 2024-08-20 PROCEDURE — 99213 OFFICE O/P EST LOW 20 MIN: CPT | Performed by: STUDENT IN AN ORGANIZED HEALTH CARE EDUCATION/TRAINING PROGRAM

## 2024-08-20 RX ORDER — BLOOD-GLUCOSE METER
KIT MISCELLANEOUS
COMMUNITY
Start: 2024-08-13

## 2024-08-20 NOTE — PROGRESS NOTES
Prenatal Care Visit    Subjective   Chief Complaint   Patient presents with    Routine Prenatal Visit     No concerns.      History:   Tea is a  currently at 30w5d who presents for a prenatal care visit today.    Denies CTX, VB, LOF. Reports (+) FM. She has been monitoring her glucose due to an abnormal 1h GTT and a borderline 3h GTT with one abnormal value.     Fastin, 97, 88, 89, 93, 96  Breakfast: 131, 118, 102, 140, 134, 91  Lunch: 108, 147, 126, 137, 146, 137  Dinner: 214, 134, 135     Objective   /70   Wt 108 kg (237 lb 3.2 oz)   LMP 2024   BMI 40.72 kg/m²   Physical Exam:  Normal, gestational age-appropriate exam today      Assessment & Plan     IUP @ 30w5d  Routine care: I have reviewed the prenatal labs and ultrasound(s) today. I have reviewed the most recent prenatal progress note(s).   Abnormal 1h GTT: passed 3h, but with one abnormal value. Glucose monitoring as above. We discussed that she does not have GDM but has evidence of abnormal glucose tolerance, and therefore it would be reasonable to continue dietary changes and intermittent glucose checks. Encouraged to notify us if seeing persistently elevated levels.  AMA: cfDNA wnl  Request for sterilization: FF signed 24  H/o R salpingectomy due to ectopic     Diagnosis Plan   1. Prenatal care in third trimester        2. Abnormal glucose tolerance test in pregnancy        3. Advanced maternal age in multigravida, third trimester        4. Request for sterilization        5. History of ectopic pregnancy           Medication Management: continue PNV    Topics discussed: Prenatal care milestones  Glucose management  Kick counts and fetal movement   labor signs and symptoms   Tests next visit: U/S for EFW   Next visit: 2 week(s)     Genet Alcantara MD  Obstetrics and Gynecology  [facility]

## 2024-09-03 ENCOUNTER — ROUTINE PRENATAL (OUTPATIENT)
Dept: OBSTETRICS AND GYNECOLOGY | Facility: CLINIC | Age: 34
End: 2024-09-03
Payer: COMMERCIAL

## 2024-09-03 VITALS — WEIGHT: 242 LBS | SYSTOLIC BLOOD PRESSURE: 122 MMHG | DIASTOLIC BLOOD PRESSURE: 72 MMHG | BODY MASS INDEX: 41.54 KG/M2

## 2024-09-03 DIAGNOSIS — O09.93 ENCOUNTER FOR SUPERVISION OF HIGH RISK PREGNANCY IN THIRD TRIMESTER, ANTEPARTUM: Primary | ICD-10-CM

## 2024-09-03 DIAGNOSIS — Z87.59 HISTORY OF ECTOPIC PREGNANCY: ICD-10-CM

## 2024-09-03 DIAGNOSIS — Z30.2 REQUEST FOR STERILIZATION: ICD-10-CM

## 2024-09-03 DIAGNOSIS — K21.9 GASTROESOPHAGEAL REFLUX DISEASE WITHOUT ESOPHAGITIS: ICD-10-CM

## 2024-09-03 DIAGNOSIS — O26.893 LOW BACK PAIN DURING PREGNANCY, THIRD TRIMESTER: ICD-10-CM

## 2024-09-03 DIAGNOSIS — O09.523 ADVANCED MATERNAL AGE IN MULTIGRAVIDA, THIRD TRIMESTER: ICD-10-CM

## 2024-09-03 DIAGNOSIS — O99.810 ABNORMAL GLUCOSE IN PREGNANCY, ANTEPARTUM: ICD-10-CM

## 2024-09-03 DIAGNOSIS — M54.50 LOW BACK PAIN DURING PREGNANCY, THIRD TRIMESTER: ICD-10-CM

## 2024-09-03 PROCEDURE — 99214 OFFICE O/P EST MOD 30 MIN: CPT | Performed by: OBSTETRICS & GYNECOLOGY

## 2024-09-03 RX ORDER — PANTOPRAZOLE SODIUM 40 MG/1
40 TABLET, DELAYED RELEASE ORAL DAILY
Qty: 30 TABLET | Refills: 6 | Status: SHIPPED | OUTPATIENT
Start: 2024-09-03

## 2024-09-03 NOTE — PROGRESS NOTES
Chief Complaint  Routine Prenatal Visit (Growth scan today. )    History of Present Illness:  Tea is a  currently at 32w5d who presents today with complaints of reflux.  Pt has not been on any medications.  Reports good fetal movement.  Patient denies any significant cramping or contractions.  Patient has been monitoring her glucose levels.  She has not had any elevated levels at home.  She is taking her prenatal vitamins.  She denies any significant cramping or contractions.  She does have low back pain however.  She has been taking Tylenol.    Exam:  Vitals:  See prenatal flowsheet as noted and reviewed  General: Alert, cooperative, and does not appear in any distress  Abdomen:   See prenatal flowsheet as noted and reviewed    Uterus gravid, non-tender; no palpable masses    No guarding or rebound tenderness  Pelvic:  See prenatal flowsheet as noted and reviewed  Ext:  See prenatal flowsheet as noted and reviewed    Moves extremities well, no cyanosis and no redness  Urine:  See prenatal flowsheet as noted and reviewed    Data Review:  The following data was reviewed by: Deloris Leon MD on 2024:  Prenatal Labs:  Lab Results   Component Value Date    HGB 11.6 2024    RUBELLAABIGG 1.70 2024    HEPBSAG Negative 2024    ABORH O Positive 2021    ABO O 2024    RH Positive 2024    ABSCRN Negative 2024    OWD2FOY5 Non Reactive 2024    HEPCVIRUSABY Non Reactive 2024    PEO4AYQF 187 (H) 2024    STREPGPB Negative 2022    URINECX No growth 2021       Routine Prenatal on 2024   Component Date Value    WBC 2024 11.3 (H)     RBC 2024 3.75 (L)     Hemoglobin 2024 11.6     Hematocrit 2024 35.3     MCV 2024 94     MCH 2024 30.9     MCHC 2024 32.9     RDW 2024 12.1     Platelets 2024 369     Neutrophil Rel % 2024 71     Lymphocyte Rel % 2024 19     Monocyte Rel % 2024 4      Eosinophil Rel % 2024 5     Basophil Rel % 2024 1     Neutrophils Absolute 2024 8.0 (H)     Lymphocytes Absolute 2024 2.2     Monocytes Absolute 2024 0.4     Eosinophils Absolute 2024 0.6 (H)     Basophils Absolute 2024 0.1     Immature Granulocyte Rel* 2024 0     Immature Grans Absolute 2024 0.0     TSH 2024 0.843      Imaging:  US Ob Follow Up Transabdominal Approach  Tea Nelson  : 1990  MRN: [id number]  Date: 9/3/2024    Reason for exam/History:  Growth    Ultrasound images are reviewed.  There is noted to be a viable   intrauterine pregnancy. The pregnancy is measuring 34 weeks 1 days   gestation.  The estimated fetal weight is 2443 grams at the 88.9 % for   growth.  The HC was at the 65.4 % and the AC was at the >97.7 %.  The   amniotic fluid index was 15.51 cms.  The fetal heart rate was normal.     The infant was in the vertex presentation.  The placental location was   noted to be posterior.      The exam limitations noted:  none    See ultrasound report for measurements and structures identified.    Deloris Leon MD, [facility]  OB GYN [place]      Medical Records:  None    Assessment and Plan:  Problem List Items Addressed This Visit          Genitourinary and Reproductive     Request for sterilization  Signed previous tubal papers as noted.     Other Visit Diagnoses       Encounter for supervision of high risk pregnancy in third trimester, antepartum    -  Primary  Topics discussed:     GERD management  iron supplementation  kick counts and fetal movement  PIH precautions   labor signs and symptoms  Scan for growth as noted    Advanced maternal age in multigravida, third trimester        History of ectopic pregnancy        Gastroesophageal reflux disease without esophagitis      Prescription given for Protonix.  Instructions and precautions have been given.    Relevant Medications    pantoprazole  (PROTONIX) 40 MG EC tablet    Low back pain during pregnancy, third trimester      Check on maternity belt as discussed.  Instructions and precautions have been given.    Abnormal glucose in pregnancy, antepartum      Patient with abnormal glucose testing as noted.  Scan for growth today.  Patient informed regarding those findings.  Recommend repeat imaging in 4 weeks.          Follow Up/Instructions:  Follow up as scheduled.  Patient was given instructions and counseling regarding her condition or for health maintenance advice. Please see specific information pulled into the AVS if appropriate.     Note: Speech recognition transcription software may have been used to dictate portions of this document.  An attempt at proofreading has been made though minor errors in transcription may still be present.    This note was electronically signed.  Deloris Leon M.D.

## 2024-09-20 ENCOUNTER — ROUTINE PRENATAL (OUTPATIENT)
Dept: OBSTETRICS AND GYNECOLOGY | Facility: CLINIC | Age: 34
End: 2024-09-20
Payer: COMMERCIAL

## 2024-09-20 VITALS — BODY MASS INDEX: 42.05 KG/M2 | DIASTOLIC BLOOD PRESSURE: 68 MMHG | SYSTOLIC BLOOD PRESSURE: 124 MMHG | WEIGHT: 245 LBS

## 2024-09-20 DIAGNOSIS — O99.810 ABNORMAL GLUCOSE IN PREGNANCY, ANTEPARTUM: ICD-10-CM

## 2024-09-20 DIAGNOSIS — O26.893 LOW BACK PAIN DURING PREGNANCY, THIRD TRIMESTER: ICD-10-CM

## 2024-09-20 DIAGNOSIS — D50.9 IRON DEFICIENCY ANEMIA DURING PREGNANCY: ICD-10-CM

## 2024-09-20 DIAGNOSIS — Z30.2 REQUEST FOR STERILIZATION: ICD-10-CM

## 2024-09-20 DIAGNOSIS — O47.9 IRREGULAR UTERINE CONTRACTIONS: ICD-10-CM

## 2024-09-20 DIAGNOSIS — O09.93 ENCOUNTER FOR SUPERVISION OF HIGH RISK PREGNANCY IN THIRD TRIMESTER, ANTEPARTUM: Primary | ICD-10-CM

## 2024-09-20 DIAGNOSIS — Z87.59 HISTORY OF ECTOPIC PREGNANCY: ICD-10-CM

## 2024-09-20 DIAGNOSIS — O09.523 ADVANCED MATERNAL AGE IN MULTIGRAVIDA, THIRD TRIMESTER: ICD-10-CM

## 2024-09-20 DIAGNOSIS — O99.019 IRON DEFICIENCY ANEMIA DURING PREGNANCY: ICD-10-CM

## 2024-09-20 DIAGNOSIS — K21.9 GASTROESOPHAGEAL REFLUX DISEASE WITHOUT ESOPHAGITIS: ICD-10-CM

## 2024-09-20 DIAGNOSIS — M54.50 LOW BACK PAIN DURING PREGNANCY, THIRD TRIMESTER: ICD-10-CM

## 2024-09-20 PROCEDURE — 99214 OFFICE O/P EST MOD 30 MIN: CPT | Performed by: OBSTETRICS & GYNECOLOGY

## 2024-09-27 ENCOUNTER — ROUTINE PRENATAL (OUTPATIENT)
Dept: OBSTETRICS AND GYNECOLOGY | Facility: CLINIC | Age: 34
End: 2024-09-27
Payer: COMMERCIAL

## 2024-09-27 VITALS — BODY MASS INDEX: 42.23 KG/M2 | WEIGHT: 246 LBS | DIASTOLIC BLOOD PRESSURE: 82 MMHG | SYSTOLIC BLOOD PRESSURE: 124 MMHG

## 2024-09-27 DIAGNOSIS — Z36.85 ENCOUNTER FOR ANTENATAL SCREENING FOR STREPTOCOCCUS B: Primary | ICD-10-CM

## 2024-09-29 LAB — GP B STREP DNA SPEC QL NAA+PROBE: NEGATIVE

## 2024-09-30 ENCOUNTER — HOSPITAL ENCOUNTER (OUTPATIENT)
Facility: HOSPITAL | Age: 34
Discharge: HOME | End: 2024-09-30
Attending: STUDENT IN AN ORGANIZED HEALTH CARE EDUCATION/TRAINING PROGRAM | Admitting: STUDENT IN AN ORGANIZED HEALTH CARE EDUCATION/TRAINING PROGRAM
Payer: COMMERCIAL

## 2024-09-30 VITALS
TEMPERATURE: 98.9 F | SYSTOLIC BLOOD PRESSURE: 118 MMHG | HEART RATE: 91 BPM | OXYGEN SATURATION: 100 % | WEIGHT: 248 LBS | BODY MASS INDEX: 42.57 KG/M2 | RESPIRATION RATE: 18 BRPM | DIASTOLIC BLOOD PRESSURE: 57 MMHG

## 2024-09-30 PROCEDURE — G0463 HOSPITAL OUTPT CLINIC VISIT: HCPCS

## 2024-09-30 PROCEDURE — 59025 FETAL NON-STRESS TEST: CPT | Performed by: STUDENT IN AN ORGANIZED HEALTH CARE EDUCATION/TRAINING PROGRAM

## 2024-09-30 PROCEDURE — 59025 FETAL NON-STRESS TEST: CPT

## 2024-09-30 RX ORDER — SODIUM CHLORIDE 9 MG/ML
40 INJECTION, SOLUTION INTRAVENOUS AS NEEDED
Status: DISCONTINUED | OUTPATIENT
Start: 2024-09-30 | End: 2024-09-30 | Stop reason: HOSPADM

## 2024-09-30 RX ORDER — LIDOCAINE HYDROCHLORIDE 10 MG/ML
0.5 INJECTION, SOLUTION INFILTRATION; PERINEURAL ONCE AS NEEDED
Status: DISCONTINUED | OUTPATIENT
Start: 2024-09-30 | End: 2024-09-30 | Stop reason: HOSPADM

## 2024-09-30 RX ORDER — SODIUM CHLORIDE 0.9 % (FLUSH) 0.9 %
10 SYRINGE (ML) INJECTION EVERY 12 HOURS SCHEDULED
Status: DISCONTINUED | OUTPATIENT
Start: 2024-09-30 | End: 2024-09-30 | Stop reason: HOSPADM

## 2024-09-30 RX ORDER — SODIUM CHLORIDE 0.9 % (FLUSH) 0.9 %
10 SYRINGE (ML) INJECTION AS NEEDED
Status: DISCONTINUED | OUTPATIENT
Start: 2024-09-30 | End: 2024-09-30 | Stop reason: HOSPADM

## 2024-10-01 NOTE — NURSING NOTE
Dr. Alcantara at bedside to check patient 1945. Pt cervix remains unchanged at 1/ thick / high. Verbal orders given to d/c pt home.

## 2024-10-01 NOTE — NON STRESS TEST
Triage Note - Nursing Documentation  Labor and Delivery Admission Log    Tea Nelson  : 1990  MRN: [id number]  CSN: [id number]    Date in / Time in:  2024  Time in:     Date out / Time out:    Time out:     Nurse: Lis Logan RN    Patient Info: She is a 34 y.o. year old  at 36w4d with an LIBAN of 10/24/2024, by Last Menstrual Period who was seen on the [facility].    Chief Complaint:   Chief Complaint   Patient presents with    Elevated Blood Pressure     Swelling in feet, SOA and hot flashes       Provider Instructions / Disposition: Pt given d/c instructions. Will follow up in office at next appt in office.    Patient Active Problem List   Diagnosis    Multigravida of advanced maternal age in second trimester    Request for sterilization    Insulin resistance    Short interval between pregnancies affecting pregnancy, antepartum       NST Documentation (Only applicable > 32 weeks): Interpretation A  Nonstress Test Interpretation A: Reactive (24 : Lis Logan, RN)

## 2024-10-04 ENCOUNTER — ROUTINE PRENATAL (OUTPATIENT)
Dept: OBSTETRICS AND GYNECOLOGY | Facility: CLINIC | Age: 34
End: 2024-10-04
Payer: COMMERCIAL

## 2024-10-04 VITALS — DIASTOLIC BLOOD PRESSURE: 80 MMHG | BODY MASS INDEX: 42.81 KG/M2 | WEIGHT: 249.4 LBS | SYSTOLIC BLOOD PRESSURE: 110 MMHG

## 2024-10-04 DIAGNOSIS — O09.522 MULTIGRAVIDA OF ADVANCED MATERNAL AGE IN SECOND TRIMESTER: Primary | ICD-10-CM

## 2024-10-04 NOTE — PROGRESS NOTES
Chief Complaint   Patient presents with    Routine Prenatal Visit     Prenatal visit with BPP done today. No problems or concerns.        HPI:   , 37w1d gestation reports doing well    ROS:  See Prenatal Episode/Flowsheet  /80   Wt 113 kg (249 lb 6.4 oz)   LMP 2024   BMI 42.81 kg/m²      EXAM:  EXTREMITIES:  No swelling-See Prenatal Episode/Flowsheet    ABDOMEN:  FHTs/Movement noted-See Prenatal Episode/Flowsheet    URINE GLUCOSE/PROTEIN:  See Prenatal Episode/Flowsheet    PELVIC EXAM:  See Prenatal Episode/Flowsheet  CV:  Lungs:  GYN:    MDM:    Lab Results   Component Value Date    HGB 11.6 2024    RUBELLAABIGG 1.70 2024    HEPBSAG Negative 2024    ABORH O Positive 2021    ABO O 2024    RH Positive 2024    ABSCRN Negative 2024    HZY5KJW0 Non Reactive 2024    HEPCVIRUSABY Non Reactive 2024    [id number] 187 (H) 2024    STREPGPB Negative 2024    URINECX No growth 2021       U/S:US Fetal Biophysical Profile;Without Non-Stress Testing (2024 13:41)   BPP 8/8, MINO 13.5, Vertex    1. IUP 37w1d  2. Routine care   3. AMA  4.Anemia: taking Fe and PNV

## 2024-10-11 ENCOUNTER — ROUTINE PRENATAL (OUTPATIENT)
Dept: OBSTETRICS AND GYNECOLOGY | Facility: CLINIC | Age: 34
End: 2024-10-11
Payer: COMMERCIAL

## 2024-10-11 VITALS — WEIGHT: 249.8 LBS | BODY MASS INDEX: 42.88 KG/M2 | SYSTOLIC BLOOD PRESSURE: 122 MMHG | DIASTOLIC BLOOD PRESSURE: 80 MMHG

## 2024-10-11 DIAGNOSIS — Z34.93 THIRD TRIMESTER PREGNANCY: Primary | ICD-10-CM

## 2024-10-11 DIAGNOSIS — R39.9 URINARY TRACT INFECTION SYMPTOMS: ICD-10-CM

## 2024-10-11 NOTE — PROGRESS NOTES
Chief Complaint   Patient presents with    Routine Prenatal Visit     Prenatal visit with BPP done today. No problems or concerns        HPI:   , 38w1d gestation reports doing well    ROS:  See Prenatal Episode/Flowsheet  /80   Wt 113 kg (249 lb 12.8 oz)   LMP 2024   BMI 42.88 kg/m²      EXAM:  EXTREMITIES:  No swelling-See Prenatal Episode/Flowsheet    ABDOMEN:  FHTs/Movement noted-See Prenatal Episode/Flowsheet    URINE GLUCOSE/PROTEIN:  See Prenatal Episode/Flowsheet    PELVIC EXAM:  See Prenatal Episode/Flowsheet  CV:  Lungs:  GYN:    MDM:    Lab Results   Component Value Date    HGB 11.6 2024    RUBELLAABIGG 1.70 2024    HEPBSAG Negative 2024    ABORH O Positive 2021    ABO O 2024    RH Positive 2024    ABSCRN Negative 2024    NSO1IUP0 Non Reactive 2024    HEPCVIRUSABY Non Reactive 2024    OBV4PVEM 187 (H) 2024    STREPGPB Negative 2024    URINECX No growth 2021       U/S: BPP 8 out of 8.  MINO 14.3.  Vertex.    1. IUP 38w1d  2. Routine care   3. Cervix: 2 outer, 1 inner, induce Friday next due to wrok

## 2024-10-13 LAB
APPEARANCE UR: ABNORMAL
BACTERIA #/AREA URNS HPF: ABNORMAL /HPF
BACTERIA UR CULT: NORMAL
BACTERIA UR CULT: NORMAL
BILIRUB UR QL STRIP: NEGATIVE
CASTS URNS MICRO: ABNORMAL
COLOR UR: ABNORMAL
EPI CELLS #/AREA URNS HPF: ABNORMAL /HPF
GLUCOSE UR QL STRIP: NEGATIVE
HGB UR QL STRIP: ABNORMAL
KETONES UR QL STRIP: ABNORMAL
LEUKOCYTE ESTERASE UR QL STRIP: ABNORMAL
NITRITE UR QL STRIP: NEGATIVE
PH UR STRIP: 5.5 [PH] (ref 5–8)
PROT UR QL STRIP: ABNORMAL
RBC #/AREA URNS HPF: ABNORMAL /HPF
SP GR UR STRIP: 1.03 (ref 1–1.03)
UROBILINOGEN UR STRIP-MCNC: ABNORMAL MG/DL
WBC #/AREA URNS HPF: ABNORMAL /HPF

## 2024-10-17 ENCOUNTER — HOSPITAL ENCOUNTER (OUTPATIENT)
Dept: LABOR AND DELIVERY | Facility: HOSPITAL | Age: 34
Discharge: HOME | End: 2024-10-17
Payer: COMMERCIAL

## 2024-10-17 ENCOUNTER — HOSPITAL ENCOUNTER (INPATIENT)
Facility: HOSPITAL | Age: 34
LOS: 2 days | Discharge: HOME | End: 2024-10-19
Attending: MIDWIFE | Admitting: MIDWIFE
Payer: COMMERCIAL

## 2024-10-17 ENCOUNTER — ROUTINE PRENATAL (OUTPATIENT)
Dept: OBSTETRICS AND GYNECOLOGY | Facility: CLINIC | Age: 34
End: 2024-10-17
Payer: COMMERCIAL

## 2024-10-17 ENCOUNTER — ANESTHESIA EVENT (OUTPATIENT)
Dept: LABOR AND DELIVERY | Facility: HOSPITAL | Age: 34
End: 2024-10-17
Payer: COMMERCIAL

## 2024-10-17 ENCOUNTER — ANESTHESIA (OUTPATIENT)
Dept: LABOR AND DELIVERY | Facility: HOSPITAL | Age: 34
End: 2024-10-17
Payer: COMMERCIAL

## 2024-10-17 VITALS — SYSTOLIC BLOOD PRESSURE: 130 MMHG | WEIGHT: 251.4 LBS | BODY MASS INDEX: 43.15 KG/M2 | DIASTOLIC BLOOD PRESSURE: 82 MMHG

## 2024-10-17 DIAGNOSIS — O09.899 SHORT INTERVAL BETWEEN PREGNANCIES AFFECTING PREGNANCY, ANTEPARTUM: Primary | ICD-10-CM

## 2024-10-17 PROBLEM — Z34.90 PREGNANCY: Status: ACTIVE | Noted: 2024-10-17

## 2024-10-17 LAB
ABO GROUP BLD: NORMAL
AMPHET+METHAMPHET UR QL: NEGATIVE
AMPHETAMINES UR QL: NEGATIVE
BARBITURATES UR QL SCN: NEGATIVE
BASOPHILS # BLD AUTO: 0.04 10*3/MM3 (ref 0–0.2)
BASOPHILS NFR BLD AUTO: 0.4 % (ref 0–1.5)
BENZODIAZ UR QL SCN: NEGATIVE
BILIRUB BLD-MCNC: NEGATIVE MG/DL
BLD GP AB SCN SERPL QL: NEGATIVE
BUPRENORPHINE SERPL-MCNC: NEGATIVE NG/ML
CANNABINOIDS SERPL QL: NEGATIVE
CLARITY, POC: ABNORMAL
COCAINE UR QL: NEGATIVE
COLOR UR: ABNORMAL
DEPRECATED RDW RBC AUTO: 40.8 FL (ref 37–54)
EOSINOPHIL # BLD AUTO: 0.29 10*3/MM3 (ref 0–0.4)
EOSINOPHIL NFR BLD AUTO: 2.6 % (ref 0.3–6.2)
ERYTHROCYTE [DISTWIDTH] IN BLOOD BY AUTOMATED COUNT: 12.6 % (ref 12.3–15.4)
FENTANYL UR-MCNC: NEGATIVE NG/ML
GLUCOSE UR STRIP-MCNC: NEGATIVE MG/DL
HCT VFR BLD AUTO: 31.1 % (ref 34–46.6)
HGB BLD-MCNC: 10.8 G/DL (ref 12–15.9)
IMM GRANULOCYTES # BLD AUTO: 0.07 10*3/MM3 (ref 0–0.05)
IMM GRANULOCYTES NFR BLD AUTO: 0.6 % (ref 0–0.5)
KETONES UR QL: ABNORMAL
LEUKOCYTE EST, POC: NEGATIVE
LYMPHOCYTES # BLD AUTO: 1.72 10*3/MM3 (ref 0.7–3.1)
LYMPHOCYTES NFR BLD AUTO: 15.6 % (ref 19.6–45.3)
MCH RBC QN AUTO: 31.1 PG (ref 26.6–33)
MCHC RBC AUTO-ENTMCNC: 34.7 G/DL (ref 31.5–35.7)
MCV RBC AUTO: 89.6 FL (ref 79–97)
METHADONE UR QL SCN: NEGATIVE
MONOCYTES # BLD AUTO: 0.5 10*3/MM3 (ref 0.1–0.9)
MONOCYTES NFR BLD AUTO: 4.5 % (ref 5–12)
NEUTROPHILS NFR BLD AUTO: 76.3 % (ref 42.7–76)
NEUTROPHILS NFR BLD AUTO: 8.43 10*3/MM3 (ref 1.7–7)
NITRITE UR-MCNC: NEGATIVE MG/ML
NRBC BLD AUTO-RTO: 0 /100 WBC (ref 0–0.2)
OPIATES UR QL: NEGATIVE
OXYCODONE UR QL SCN: NEGATIVE
PCP UR QL SCN: NEGATIVE
PH UR: 7 [PH] (ref 5–8)
PLATELET # BLD AUTO: 288 10*3/MM3 (ref 140–450)
PMV BLD AUTO: 10.7 FL (ref 6–12)
PROT UR STRIP-MCNC: NEGATIVE MG/DL
RBC # BLD AUTO: 3.47 10*6/MM3 (ref 3.77–5.28)
RBC # UR STRIP: NEGATIVE /UL
RH BLD: POSITIVE
SP GR UR: 1.01 (ref 1–1.03)
T&S EXPIRATION DATE: NORMAL
TRICYCLICS UR QL SCN: NEGATIVE
UROBILINOGEN UR QL: NORMAL
WBC NRBC COR # BLD AUTO: 11.05 10*3/MM3 (ref 3.4–10.8)

## 2024-10-17 PROCEDURE — 59025 FETAL NON-STRESS TEST: CPT

## 2024-10-17 PROCEDURE — 81002 URINALYSIS NONAUTO W/O SCOPE: CPT | Performed by: MIDWIFE

## 2024-10-17 PROCEDURE — 85025 COMPLETE CBC W/AUTO DIFF WBC: CPT | Performed by: MIDWIFE

## 2024-10-17 PROCEDURE — 80307 DRUG TEST PRSMV CHEM ANLYZR: CPT | Performed by: MIDWIFE

## 2024-10-17 PROCEDURE — 86780 TREPONEMA PALLIDUM: CPT | Performed by: MIDWIFE

## 2024-10-17 PROCEDURE — 86850 RBC ANTIBODY SCREEN: CPT | Performed by: MIDWIFE

## 2024-10-17 PROCEDURE — 25810000003 LACTATED RINGERS SOLUTION: Performed by: MIDWIFE

## 2024-10-17 PROCEDURE — G0463 HOSPITAL OUTPT CLINIC VISIT: HCPCS

## 2024-10-17 PROCEDURE — 25810000003 LACTATED RINGERS PER 1000 ML: Performed by: MIDWIFE

## 2024-10-17 PROCEDURE — 3E033VJ INTRODUCTION OF OTHER HORMONE INTO PERIPHERAL VEIN, PERCUTANEOUS APPROACH: ICD-10-PCS | Performed by: MIDWIFE

## 2024-10-17 PROCEDURE — 99222 1ST HOSP IP/OBS MODERATE 55: CPT | Performed by: MIDWIFE

## 2024-10-17 PROCEDURE — 86901 BLOOD TYPING SEROLOGIC RH(D): CPT | Performed by: MIDWIFE

## 2024-10-17 PROCEDURE — 86900 BLOOD TYPING SEROLOGIC ABO: CPT | Performed by: MIDWIFE

## 2024-10-17 RX ORDER — OXYTOCIN/0.9 % SODIUM CHLORIDE 30/500 ML
1-30 PLASTIC BAG, INJECTION (ML) INTRAVENOUS
Status: DISCONTINUED | OUTPATIENT
Start: 2024-10-17 | End: 2024-10-18 | Stop reason: HOSPADM

## 2024-10-17 RX ORDER — SODIUM CHLORIDE 0.9 % (FLUSH) 0.9 %
10 SYRINGE (ML) INJECTION AS NEEDED
Status: DISCONTINUED | OUTPATIENT
Start: 2024-10-17 | End: 2024-10-18 | Stop reason: HOSPADM

## 2024-10-17 RX ORDER — SODIUM CHLORIDE, SODIUM LACTATE, POTASSIUM CHLORIDE, CALCIUM CHLORIDE 600; 310; 30; 20 MG/100ML; MG/100ML; MG/100ML; MG/100ML
30 INJECTION, SOLUTION INTRAVENOUS CONTINUOUS
Status: DISCONTINUED | OUTPATIENT
Start: 2024-10-17 | End: 2024-10-18

## 2024-10-17 RX ORDER — SODIUM CHLORIDE 9 MG/ML
40 INJECTION, SOLUTION INTRAVENOUS AS NEEDED
Status: DISCONTINUED | OUTPATIENT
Start: 2024-10-17 | End: 2024-10-18 | Stop reason: HOSPADM

## 2024-10-17 RX ORDER — PROMETHAZINE HYDROCHLORIDE 12.5 MG/1
12.5 TABLET ORAL EVERY 6 HOURS PRN
Status: DISCONTINUED | OUTPATIENT
Start: 2024-10-17 | End: 2024-10-18 | Stop reason: HOSPADM

## 2024-10-17 RX ORDER — ACETAMINOPHEN 325 MG/1
650 TABLET ORAL EVERY 4 HOURS PRN
Status: DISCONTINUED | OUTPATIENT
Start: 2024-10-17 | End: 2024-10-18 | Stop reason: HOSPADM

## 2024-10-17 RX ORDER — SODIUM CHLORIDE 0.9 % (FLUSH) 0.9 %
10 SYRINGE (ML) INJECTION EVERY 12 HOURS SCHEDULED
Status: DISCONTINUED | OUTPATIENT
Start: 2024-10-17 | End: 2024-10-18 | Stop reason: HOSPADM

## 2024-10-17 RX ORDER — MAGNESIUM CARB/ALUMINUM HYDROX 105-160MG
30 TABLET,CHEWABLE ORAL ONCE
Status: COMPLETED | OUTPATIENT
Start: 2024-10-17 | End: 2024-10-18

## 2024-10-17 RX ORDER — PROMETHAZINE HYDROCHLORIDE 12.5 MG/1
12.5 SUPPOSITORY RECTAL EVERY 6 HOURS PRN
Status: DISCONTINUED | OUTPATIENT
Start: 2024-10-17 | End: 2024-10-18 | Stop reason: HOSPADM

## 2024-10-17 RX ORDER — HYDROXYZINE HYDROCHLORIDE 25 MG/1
50 TABLET, FILM COATED ORAL NIGHTLY PRN
Status: DISCONTINUED | OUTPATIENT
Start: 2024-10-17 | End: 2024-10-18 | Stop reason: HOSPADM

## 2024-10-17 RX ORDER — LIDOCAINE HYDROCHLORIDE 10 MG/ML
0.5 INJECTION, SOLUTION INFILTRATION; PERINEURAL ONCE AS NEEDED
Status: DISCONTINUED | OUTPATIENT
Start: 2024-10-17 | End: 2024-10-18 | Stop reason: HOSPADM

## 2024-10-17 RX ORDER — ONDANSETRON 2 MG/ML
4 INJECTION INTRAMUSCULAR; INTRAVENOUS EVERY 6 HOURS PRN
Status: DISCONTINUED | OUTPATIENT
Start: 2024-10-17 | End: 2024-10-18 | Stop reason: HOSPADM

## 2024-10-17 RX ORDER — ONDANSETRON 4 MG/1
4 TABLET, ORALLY DISINTEGRATING ORAL EVERY 6 HOURS PRN
Status: DISCONTINUED | OUTPATIENT
Start: 2024-10-17 | End: 2024-10-18 | Stop reason: HOSPADM

## 2024-10-17 RX ADMIN — Medication 1 MILLI-UNITS/MIN: at 21:15

## 2024-10-17 RX ADMIN — SODIUM CHLORIDE, POTASSIUM CHLORIDE, SODIUM LACTATE AND CALCIUM CHLORIDE 30 ML/HR: 600; 310; 30; 20 INJECTION, SOLUTION INTRAVENOUS at 21:17

## 2024-10-17 RX ADMIN — HYDROXYZINE HYDROCHLORIDE 50 MG: 25 TABLET ORAL at 21:28

## 2024-10-17 RX ADMIN — SODIUM CHLORIDE, POTASSIUM CHLORIDE, SODIUM LACTATE AND CALCIUM CHLORIDE 1000 ML: 600; 310; 30; 20 INJECTION, SOLUTION INTRAVENOUS at 17:00

## 2024-10-17 NOTE — H&P
ANNELISE Vo  Tea Nelson  : 1990  MRN: [id number]  CSN: [id number]    History and Physical    Chief Complaint   Patient presents with    Scheduled Induction     Scheduled elective induction. Denies any leaking of fluid or bleeding. States she is feeling baby move good.      Subjective   Tea Nelson is a 34 y.o. year old  with an Estimated Date of Delivery: 10/24/24 currently 39w0d presenting for induction of labor for elective at term per patient request.     Risks of labor induction including prolongation of labor, increased risks for both  section and operative vaginal birth have been discussed at length.     Prenatal care has been with E THERESA Vo.  It has been complicated by failed 1 hr glucola passed 3 hr GTT, AMA (genetic screening was normal), and anemia. First PNV on 24 @ 11 weeks with 13 visits. Weight gain = 36 lbs.     OB History    Para Term  AB Living   8 4 4 0 3 4   SAB IAB Ectopic Molar Multiple Live Births   2 0 1 0 0 4      # Outcome Date GA Lbr Jung/2nd Weight Sex Type Anes PTL Lv   8 Current            7 Term 12/10/22 40w0d 01:56 / 00:05 4024 g (8 lb 13.9 oz) F Vag-Spont None N PANCHO      Name: NY NELSON      Apgar1: 9  Apgar5: 9   6 Term 21 38w2d / 00:20 3799 g (8 lb 6 oz) M Vag-Spont EPI N PANCHO      Name: CHILO SALDANASENAITSUNITHA      Apgar1: 8  Apgar5: 8   5 Term 09 38w0d  3827 g (8 lb 7 oz) M Vag-Spont  N PANCHO   4 Term 07 40w0d  3685 g (8 lb 2 oz) M Vag-Spont  N PANCHO   3 SAB            2 SAB            1 Ectopic              Past Medical History:   Diagnosis Date    Patient denies medical problems      Past Surgical History:   Procedure Laterality Date    LAPAROSCOPIC EXPLORATION FOR ECTOPIC PREGNANCY N/A 2021    Procedure: LAPAROSCOPIC EXPLORATION FOR ECTOPIC PREGNANCY, RIGHT SALPINGECTOMY;  Surgeon: Deloris Leon MD;  Location: [facility];  Service: Obstetrics/Gynecology;  Laterality: N/A;    NO PAST SURGERIES       TONSILLECTOMY AND ADENOIDECTOMY         Current Facility-Administered Medications:     acetaminophen (TYLENOL) tablet 650 mg, 650 mg, Oral, Q4H PRN, Georgia Ortiz A, CNM    hydrOXYzine (ATARAX) tablet 50 mg, 50 mg, Oral, Nightly PRN, Stephanie Ortizorah A, CNM    lactated ringers bolus 1,000 mL, 1,000 mL, Intravenous, Once, Janell Ortizh A, CNM    lactated ringers infusion, 30 mL/hr, Intravenous, Continuous, Janell Ortizh A, CNM    lidocaine (XYLOCAINE) 1 % injection 0.5 mL, 0.5 mL, Intradermal, Once PRN, Georgia Ortiz A, CNM    mineral oil liquid 30 mL, 30 mL, Topical, Once, Georgia Otriz A, SAULOM    morphine injection 4 mg, 4 mg, Intravenous, Q4H PRN, Georgia Ortiz A, CNM    morphine injection 6 mg, 6 mg, Intravenous, Q4H PRN, Janell Ortizh A, CNM    ondansetron ODT (ZOFRAN-ODT) disintegrating tablet 4 mg, 4 mg, Oral, Q6H PRN **OR** ondansetron (ZOFRAN) injection 4 mg, 4 mg, Intravenous, Q6H PRN, Georgia Ortiz A, CNM    oxytocin (PITOCIN) 30 units in 0.9% sodium chloride 500 mL (premix), 1-30 iram-units/min, Intravenous, Titrated, Janell Ortizh A, CNM    promethazine (PHENERGAN) suppository 12.5 mg, 12.5 mg, Rectal, Q6H PRN **OR** promethazine (PHENERGAN) tablet 12.5 mg, 12.5 mg, Oral, Q6H PRN, Janell Ortizh A, CNM    sodium chloride 0.9 % flush 10 mL, 10 mL, Intravenous, Q12H, Stephanie Ortizorah A, CNM    sodium chloride 0.9 % flush 10 mL, 10 mL, Intravenous, PRN, Stephanie Ortizorah A, CNM    sodium chloride 0.9 % infusion 40 mL, 40 mL, Intravenous, PRN, Stephanie Ortizorah A, CNM    Allergies   Allergen Reactions    Ceclor [Cefaclor] Rash     Social History    Tobacco Use      Smoking status: Never      Smokeless tobacco: Never    Review of Systems   Constitutional: Negative.    Respiratory: Negative.     Cardiovascular: Negative.    Gastrointestinal: Negative.    Genitourinary: Negative.    Musculoskeletal: Negative.    Skin: Negative.    Neurological: Negative.    Hematological: Negative.     Psychiatric/Behavioral: Negative.     All other systems reviewed and are negative.        Objective   LMP 01/18/2024                                           General:  well developed; well nourished  no acute distress  mentation appropriate   Neck:    Heart:   supple and no masses  normal apical impulse  regular rate and rhythm   Lungs: breathing is unlabored   Abdomen: Gravid and non-tender  EFW: 8.5#, growth scan @ 36 weeks=72%, AC 72%, posterior placenta   FHT's: reassuring, reactive, tachycardia, and category 1   Cervix: was checked (by me): 2-3 cm / 50 % / -3, cervix to left side   Presentation: cephalic   Contractions: none - external monitors used   Extremities:   normal appearance with no cyanosis or edema and no calf tenderness   Skin:  Skin color, texture, turgor normal. No rashes or lesions or Skin warm and dry   Psych:  Normal. and Alert and oriented, appropriate affect.       Prenatal Labs  Lab Results   Component Value Date    HGB 10.8 (L) 10/17/2024    HEPBSAG Negative 04/04/2024    ABORH O Positive 11/24/2021    ABSCRN Negative 04/04/2024    [id number] Non Reactive 04/04/2024    HEPCVIRUSABY Non Reactive 04/04/2024    STREPGPB Negative 09/27/2024    URINECX Final report 10/11/2024       Current Labs Reviewed   Lab Results (last 24 hours)       Procedure Component Value Units Date/Time    CBC & Differential [[id number]]  (Abnormal) Collected: 10/17/24 1650    Specimen: Blood Updated: 10/17/24 1703    Narrative:      The following orders were created for panel order CBC & Differential.  Procedure                               Abnormality         Status                     ---------                               -----------         ------                     CBC Auto Differential[[id number]]        Abnormal            Final result                 Please view results for these tests on the individual orders.    CBC Auto Differential [[id number]]  (Abnormal) Collected: 10/17/24 1650    Specimen: Blood  Updated: 10/17/24 1703     WBC 11.05 10*3/mm3      RBC 3.47 10*6/mm3      Hemoglobin 10.8 g/dL      Hematocrit 31.1 %      MCV 89.6 fL      MCH 31.1 pg      MCHC 34.7 g/dL      RDW 12.6 %      RDW-SD 40.8 fl      MPV 10.7 fL      Platelets 288 10*3/mm3      Neutrophil % 76.3 %      Lymphocyte % 15.6 %      Monocyte % 4.5 %      Eosinophil % 2.6 %      Basophil % 0.4 %      Immature Grans % 0.6 %      Neutrophils, Absolute 8.43 10*3/mm3      Lymphocytes, Absolute 1.72 10*3/mm3      Monocytes, Absolute 0.50 10*3/mm3      Eosinophils, Absolute 0.29 10*3/mm3      Basophils, Absolute 0.04 10*3/mm3      Immature Grans, Absolute 0.07 10*3/mm3      nRBC 0.0 /100 WBC     Treponema pallidum AB w/Reflex RPR [[id number]] Collected: 10/17/24 1650    Specimen: Blood Updated: 10/17/24 1659               ASSESSMENT  IUP at 39w0d  Induction of labor because of elective at term per patient request  Group B strep status: negative  Reactive NST    PLAN  Admit to   Hannah bulb attempted, difficult to visualize cervix due to vaginal walls. Will do Low dose Pitocin induction tonight and increase per protocol @ 0500  All procedures explained to patient. Questions answered and verbalized understanding.  Epidural when needed  Anticipate     Georgia Ortiz, YO  10/17/2024  17:32 EDT

## 2024-10-17 NOTE — ANESTHESIA PREPROCEDURE EVALUATION
Anesthesia Evaluation     Patient summary reviewed, Nursing notes reviewed and pregnancy test completed   no history of anesthetic complications:   NPO Solid Status: > 8 hours  NPO Liquid Status: > 8 hours           Airway   Mallampati: II  TM distance: >3 FB  Neck ROM: full  no difficulty expected and Possible difficult intubation  Dental - normal exam     Pulmonary - normal exam   Cardiovascular - normal exam    Rhythm: regular  Rate: normal        Neuro/Psych  GI/Hepatic/Renal/Endo    (+) morbid obesity, GERD, diabetes mellitus gestational    Musculoskeletal     Abdominal  - normal exam    Abdomen: soft.  Bowel sounds: normal.   Substance History      OB/GYN    (+) Pregnant        Other                    Anesthesia Plan    ASA 3     epidural     (Risks discussed , including but not limited to:  Headache, itching, n&v, infection, failure, decreased blood pressure, permanent chronic/back pain, nerve damage, paralysis, etc. All questions answered and informed consent obtained. )    Anesthetic plan, risks, benefits, and alternatives have been provided, discussed and informed consent has been obtained with: patient.  Pre-procedure education provided    CODE STATUS:

## 2024-10-17 NOTE — PROGRESS NOTES
Chief Complaint   Patient presents with    Routine Prenatal Visit     Prenatal visit with no problems or concerns.        HPI:   , 39w0d gestation reports doing well    ROS:  See Prenatal Episode/Flowsheet  /82   Wt 114 kg (251 lb 6.4 oz)   LMP 2024   BMI 43.15 kg/m²      EXAM:  EXTREMITIES:  No swelling-See Prenatal Episode/Flowsheet    ABDOMEN:  FHTs/Movement noted-See Prenatal Episode/Flowsheet    URINE GLUCOSE/PROTEIN:  See Prenatal Episode/Flowsheet    PELVIC EXAM:  See Prenatal Episode/Flowsheet  CV:  Lungs:  GYN:    MDM:    Lab Results   Component Value Date    HGB 11.6 2024    RUBELLAABIGG 1.70 2024    HEPBSAG Negative 2024    ABORH O Positive 2021    ABO O 2024    RH Positive 2024    ABSCRN Negative 2024    [id number] Non Reactive 2024    HEPCVIRUSABY Non Reactive 2024    [id number] 187 (H) 2024    STREPGPB Negative 2024    URINECX Final report 10/11/2024       U/S:    1. IUP 39w0d  2. Routine care   3. Cervix outer 2cm, inner 1, 50%

## 2024-10-18 LAB — TREPONEMA PALLIDUM IGG+IGM AB [PRESENCE] IN SERUM OR PLASMA BY IMMUNOASSAY: NORMAL

## 2024-10-18 PROCEDURE — 63710000001 DIPHENHYDRAMINE PER 50 MG: Performed by: MIDWIFE

## 2024-10-18 PROCEDURE — C1755 CATHETER, INTRASPINAL: HCPCS | Performed by: NURSE ANESTHETIST, CERTIFIED REGISTERED

## 2024-10-18 PROCEDURE — 10907ZC DRAINAGE OF AMNIOTIC FLUID, THERAPEUTIC FROM PRODUCTS OF CONCEPTION, VIA NATURAL OR ARTIFICIAL OPENING: ICD-10-PCS | Performed by: MIDWIFE

## 2024-10-18 PROCEDURE — 25810000003 LACTATED RINGERS PER 1000 ML: Performed by: MIDWIFE

## 2024-10-18 PROCEDURE — 51703 INSERT BLADDER CATH COMPLEX: CPT

## 2024-10-18 PROCEDURE — 59410 OBSTETRICAL CARE: CPT | Performed by: MIDWIFE

## 2024-10-18 RX ORDER — CARBOPROST TROMETHAMINE 250 UG/ML
250 INJECTION, SOLUTION INTRAMUSCULAR AS NEEDED
Status: DISCONTINUED | OUTPATIENT
Start: 2024-10-18 | End: 2024-10-18 | Stop reason: HOSPADM

## 2024-10-18 RX ORDER — DOCUSATE SODIUM 100 MG/1
100 CAPSULE, LIQUID FILLED ORAL 2 TIMES DAILY PRN
Status: DISCONTINUED | OUTPATIENT
Start: 2024-10-18 | End: 2024-10-19 | Stop reason: HOSPADM

## 2024-10-18 RX ORDER — SODIUM CHLORIDE 0.9 % (FLUSH) 0.9 %
1-10 SYRINGE (ML) INJECTION AS NEEDED
Status: DISCONTINUED | OUTPATIENT
Start: 2024-10-18 | End: 2024-10-19 | Stop reason: HOSPADM

## 2024-10-18 RX ORDER — EPHEDRINE SULFATE 5 MG/ML
5 INJECTION INTRAVENOUS
Status: DISCONTINUED | OUTPATIENT
Start: 2024-10-18 | End: 2024-10-18 | Stop reason: HOSPADM

## 2024-10-18 RX ORDER — ONDANSETRON 4 MG/1
4 TABLET, ORALLY DISINTEGRATING ORAL EVERY 8 HOURS PRN
Status: DISCONTINUED | OUTPATIENT
Start: 2024-10-18 | End: 2024-10-19 | Stop reason: HOSPADM

## 2024-10-18 RX ORDER — ONDANSETRON 2 MG/ML
4 INJECTION INTRAMUSCULAR; INTRAVENOUS ONCE AS NEEDED
Status: DISCONTINUED | OUTPATIENT
Start: 2024-10-18 | End: 2024-10-18 | Stop reason: HOSPADM

## 2024-10-18 RX ORDER — MORPHINE SULFATE 2 MG/ML
2 INJECTION, SOLUTION INTRAMUSCULAR; INTRAVENOUS ONCE AS NEEDED
Status: DISCONTINUED | OUTPATIENT
Start: 2024-10-18 | End: 2024-10-18 | Stop reason: HOSPADM

## 2024-10-18 RX ORDER — FENTANYL/ROPIVACAINE/NS/PF 2MCG/ML-.2
PLASTIC BAG, INJECTION (ML) INJECTION
Status: COMPLETED
Start: 2024-10-18 | End: 2024-10-18

## 2024-10-18 RX ORDER — PROMETHAZINE HYDROCHLORIDE 25 MG/1
25 TABLET ORAL EVERY 6 HOURS PRN
Status: DISCONTINUED | OUTPATIENT
Start: 2024-10-18 | End: 2024-10-19 | Stop reason: HOSPADM

## 2024-10-18 RX ORDER — MISOPROSTOL 200 UG/1
800 TABLET ORAL AS NEEDED
Status: DISCONTINUED | OUTPATIENT
Start: 2024-10-18 | End: 2024-10-18 | Stop reason: HOSPADM

## 2024-10-18 RX ORDER — BISACODYL 10 MG
10 SUPPOSITORY, RECTAL RECTAL DAILY PRN
Status: DISCONTINUED | OUTPATIENT
Start: 2024-10-19 | End: 2024-10-19 | Stop reason: HOSPADM

## 2024-10-18 RX ORDER — OXYTOCIN/0.9 % SODIUM CHLORIDE 30/500 ML
999 PLASTIC BAG, INJECTION (ML) INTRAVENOUS ONCE
Status: COMPLETED | OUTPATIENT
Start: 2024-10-18 | End: 2024-10-18

## 2024-10-18 RX ORDER — METHYLERGONOVINE MALEATE 0.2 MG/ML
200 INJECTION INTRAVENOUS ONCE AS NEEDED
Status: DISCONTINUED | OUTPATIENT
Start: 2024-10-18 | End: 2024-10-18 | Stop reason: HOSPADM

## 2024-10-18 RX ORDER — HYDROCODONE BITARTRATE AND ACETAMINOPHEN 10; 325 MG/1; MG/1
1 TABLET ORAL EVERY 4 HOURS PRN
Status: DISCONTINUED | OUTPATIENT
Start: 2024-10-18 | End: 2024-10-19 | Stop reason: HOSPADM

## 2024-10-18 RX ORDER — DIPHENHYDRAMINE HCL 25 MG
25 CAPSULE ORAL NIGHTLY PRN
Status: DISCONTINUED | OUTPATIENT
Start: 2024-10-18 | End: 2024-10-19 | Stop reason: HOSPADM

## 2024-10-18 RX ORDER — HYDROCORTISONE 25 MG/G
1 CREAM TOPICAL 2 TIMES DAILY PRN
Status: DISCONTINUED | OUTPATIENT
Start: 2024-10-18 | End: 2024-10-19 | Stop reason: HOSPADM

## 2024-10-18 RX ORDER — CITRIC ACID/SODIUM CITRATE 334-500MG
30 SOLUTION, ORAL ORAL ONCE
Status: DISCONTINUED | OUTPATIENT
Start: 2024-10-18 | End: 2024-10-18 | Stop reason: HOSPADM

## 2024-10-18 RX ORDER — ACETAMINOPHEN 325 MG/1
650 TABLET ORAL ONCE AS NEEDED
Status: DISCONTINUED | OUTPATIENT
Start: 2024-10-18 | End: 2024-10-18 | Stop reason: HOSPADM

## 2024-10-18 RX ORDER — ACETAMINOPHEN 325 MG/1
650 TABLET ORAL EVERY 6 HOURS PRN
Status: DISCONTINUED | OUTPATIENT
Start: 2024-10-18 | End: 2024-10-19 | Stop reason: HOSPADM

## 2024-10-18 RX ORDER — PROMETHAZINE HYDROCHLORIDE 12.5 MG/1
12.5 TABLET ORAL EVERY 6 HOURS PRN
Status: DISCONTINUED | OUTPATIENT
Start: 2024-10-18 | End: 2024-10-18 | Stop reason: HOSPADM

## 2024-10-18 RX ORDER — ONDANSETRON 4 MG/1
4 TABLET, ORALLY DISINTEGRATING ORAL ONCE AS NEEDED
Status: DISCONTINUED | OUTPATIENT
Start: 2024-10-18 | End: 2024-10-18 | Stop reason: HOSPADM

## 2024-10-18 RX ORDER — PROMETHAZINE HYDROCHLORIDE 12.5 MG/1
12.5 SUPPOSITORY RECTAL EVERY 6 HOURS PRN
Status: DISCONTINUED | OUTPATIENT
Start: 2024-10-18 | End: 2024-10-19 | Stop reason: HOSPADM

## 2024-10-18 RX ORDER — HYDROCODONE BITARTRATE AND ACETAMINOPHEN 5; 325 MG/1; MG/1
1 TABLET ORAL EVERY 4 HOURS PRN
Status: DISCONTINUED | OUTPATIENT
Start: 2024-10-18 | End: 2024-10-18 | Stop reason: HOSPADM

## 2024-10-18 RX ORDER — OXYTOCIN/0.9 % SODIUM CHLORIDE 30/500 ML
250 PLASTIC BAG, INJECTION (ML) INTRAVENOUS CONTINUOUS
Status: ACTIVE | OUTPATIENT
Start: 2024-10-18 | End: 2024-10-18

## 2024-10-18 RX ORDER — OXYTOCIN/0.9 % SODIUM CHLORIDE 30/500 ML
125 PLASTIC BAG, INJECTION (ML) INTRAVENOUS CONTINUOUS PRN
Status: DISCONTINUED | OUTPATIENT
Start: 2024-10-18 | End: 2024-10-19 | Stop reason: HOSPADM

## 2024-10-18 RX ORDER — ONDANSETRON 2 MG/ML
4 INJECTION INTRAMUSCULAR; INTRAVENOUS EVERY 6 HOURS PRN
Status: DISCONTINUED | OUTPATIENT
Start: 2024-10-18 | End: 2024-10-19 | Stop reason: HOSPADM

## 2024-10-18 RX ORDER — PRENATAL VIT/IRON FUM/FOLIC AC 27MG-0.8MG
1 TABLET ORAL DAILY
Status: DISCONTINUED | OUTPATIENT
Start: 2024-10-19 | End: 2024-10-19 | Stop reason: HOSPADM

## 2024-10-18 RX ORDER — HYDROCODONE BITARTRATE AND ACETAMINOPHEN 5; 325 MG/1; MG/1
1 TABLET ORAL EVERY 4 HOURS PRN
Status: DISCONTINUED | OUTPATIENT
Start: 2024-10-18 | End: 2024-10-19 | Stop reason: HOSPADM

## 2024-10-18 RX ORDER — IBUPROFEN 600 MG/1
600 TABLET, FILM COATED ORAL EVERY 6 HOURS PRN
Status: DISCONTINUED | OUTPATIENT
Start: 2024-10-18 | End: 2024-10-19 | Stop reason: HOSPADM

## 2024-10-18 RX ADMIN — Medication 999 ML/HR: at 13:07

## 2024-10-18 RX ADMIN — Medication 12 ML/HR: at 09:12

## 2024-10-18 RX ADMIN — DOCUSATE SODIUM 100 MG: 100 CAPSULE, LIQUID FILLED ORAL at 15:59

## 2024-10-18 RX ADMIN — DIPHENHYDRAMINE HYDROCHLORIDE 25 MG: 25 CAPSULE ORAL at 20:45

## 2024-10-18 RX ADMIN — MINERAL OIL 30 ML: 1000 SOLUTION ORAL at 12:52

## 2024-10-18 RX ADMIN — Medication 250 ML/HR: at 13:23

## 2024-10-18 RX ADMIN — ACETAMINOPHEN 650 MG: 325 TABLET, FILM COATED ORAL at 20:45

## 2024-10-18 RX ADMIN — HYDROCODONE BITARTRATE AND ACETAMINOPHEN 1 TABLET: 10; 325 TABLET ORAL at 22:05

## 2024-10-18 RX ADMIN — SODIUM CHLORIDE, POTASSIUM CHLORIDE, SODIUM LACTATE AND CALCIUM CHLORIDE 30 ML/HR: 600; 310; 30; 20 INJECTION, SOLUTION INTRAVENOUS at 09:14

## 2024-10-18 NOTE — NON STRESS TEST
Tea Nelson, a  at 39w1d with an LIBAN of 10/24/2024, by Last Menstrual Period, was seen at [facility] for a nonstress test.    Chief Complaint   Patient presents with    Scheduled Induction     Scheduled elective induction. Denies any leaking of fluid or bleeding. States she is feeling baby move good.       Patient Active Problem List   Diagnosis    Multigravida of advanced maternal age in second trimester    Request for sterilization    Insulin resistance    Short interval between pregnancies affecting pregnancy, antepartum    Pregnancy       Start Time:   Stop Time:     Interpretation A  Nonstress Test Interpretation A: Reactive

## 2024-10-18 NOTE — PROGRESS NOTES
Tavo Nelson  : 1990  MRN: [id number]  CSN: [id number]    Labor progress note    Subjective   She reports ctxs are getting uncomfortable. She requests an epidural.     Objective   Min/max vitals past 24 hours:  Temp  Min: 98.2 °F (36.8 °C)  Max: 99.4 °F (37.4 °C)   BP  Min: 83/60  Max: 155/141   Pulse  Min: 76  Max: 130   Resp  Min: 14  Max: 18        FHT's: reassuring, reactive, and category 1   Cervix: was checked (by me): 2-3 cm / 50 % / -3 posterior and to the left side, AROM clear fluid   Contractions: every 2-4 minutes - external monitors used Pitocin @ 12 mu      Assessment   IUP at 39w1d  Fetal status reassuring     Plan   Continue with induction  AROM - clear fluid seen   OK for epidural    Georgia Ortiz, APRN  10/18/2024  07:49 EDT

## 2024-10-18 NOTE — PLAN OF CARE
Problem: Adult Inpatient Plan of Care  Goal: Plan of Care Review  Outcome: Progressing  Flowsheets (Taken 10/18/2024 1633)  Progress: improving  Outcome Evaluation:   VSS, pt transfered to PP. unable to void on own just yet. encouraged frequent attempts. bleeding minimal   fundus firm. pain controlled and pt declining further pain medication at this time. Pt did request colace and would like to stay on top of that.  Plan of Care Reviewed With:   patient   significant other  Goal: Patient-Specific Goal (Individualized)  Outcome: Progressing  Goal: Absence of Hospital-Acquired Illness or Injury  Outcome: Progressing  Intervention: Identify and Manage Fall Risk  Recent Flowsheet Documentation  Taken 10/18/2024 1600 by Kayla Shane RN  Safety Promotion/Fall Prevention:   assistive device/personal items within reach   clutter free environment maintained   nonskid shoes/slippers when out of bed   safety round/check completed   toileting scheduled  Intervention: Prevent Skin Injury  Recent Flowsheet Documentation  Taken 10/18/2024 1600 by Kayla Shane RN  Body Position: position changed independently  Intervention: Prevent Infection  Recent Flowsheet Documentation  Taken 10/18/2024 1600 by Kayla Shane RN  Infection Prevention:   hand hygiene promoted   rest/sleep promoted   single patient room provided  Goal: Optimal Comfort and Wellbeing  Outcome: Progressing  Intervention: Provide Person-Centered Care  Recent Flowsheet Documentation  Taken 10/18/2024 1600 by Kayla Shane RN  Trust Relationship/Rapport:   care explained   choices provided   emotional support provided   questions answered   questions encouraged   reassurance provided   thoughts/feelings acknowledged   empathic listening provided  Goal: Readiness for Transition of Care  Outcome: Progressing     Problem: Labor  Goal: Hemostasis  Outcome: Progressing  Goal: Stable Fetal Wellbeing  Outcome: Progressing  Intervention: Promote and  Monitor Fetal Wellbeing  Recent Flowsheet Documentation  Taken 10/18/2024 1600 by Kayla Shane RN  Body Position: position changed independently  Goal: Effective Progression to Delivery  Outcome: Progressing  Goal: Absence of Infection Signs and Symptoms  Outcome: Progressing  Intervention: Prevent or Manage Infection  Recent Flowsheet Documentation  Taken 10/18/2024 1600 by Kayla Shane RN  Infection Prevention:   hand hygiene promoted   rest/sleep promoted   single patient room provided  Goal: Acceptable Pain Control  Outcome: Progressing  Goal: Normal Uterine Contraction Pattern  Outcome: Progressing  Intervention: Monitor and Manage Uterine Activity  Recent Flowsheet Documentation  Taken 10/18/2024 1600 by Kayla Shane RN  Fluid/Electrolyte Management: fluids provided   Goal Outcome Evaluation:  Plan of Care Reviewed With: patient, significant other        Progress: improving  Outcome Evaluation: VSS, pt transfered to . unable to void on own just yet. encouraged frequent attempts. bleeding minimal; fundus firm. pain controlled and pt declining further pain medication at this time. Pt did request colace and would like to stay on top of that.

## 2024-10-18 NOTE — ANESTHESIA PROCEDURE NOTES
Labor Epidural    Pre-sedation assessment completed: 10/18/2024 8:57 AM    Patient reassessed immediately prior to procedure    Patient location during procedure: OB  Start Time: 10/18/2024 8:56 AM  Stop Time: 10/18/2024 9:10 AM  Performed By  CRNA/CAA: Ananda García CRNA  Preanesthetic Checklist  Completed: patient identified, IV checked, site marked, risks and benefits discussed, surgical consent, monitors and equipment checked, pre-op evaluation and timeout performed  Additional Notes  Risks discussed , including but not limited to:  Headache, itching, n&v, infection, failure, decreased blood pressure, permanent chronic/back pain, nerve damage, paralysis, etc. All questions answered and informed consent obtained. Skin localized with lidocaine skin wheel. Test dose 3 ml of 1.5% lidocaine with 1:200,000 epi.  Prep:  Pt Position:sitting  Sterile Tech:cap, gloves, mask and sterile barrier  Prep:chlorhexidine gluconate and isopropyl alcohol  Monitoring:blood pressure monitoring and continuous pulse oximetry  Epidural Block Procedure:  Approach:midline  Guidance:landmark technique and palpation technique  Location:L4-L5  Needle Type:Tuohy  Needle Gauge:18 G  Loss of Resistance Medium: saline  Loss of Resistance: 10cm  Cath Depth at skin:12 cm  Paresthesia: none  Aspiration:negative  Test Dose:negative  Number of Attempts: 1  Post Assessment:  Dressing:occlusive dressing applied and secured with tape  Pt Tolerance:patient tolerated the procedure well with no apparent complications  Complications:no

## 2024-10-18 NOTE — L&D DELIVERY NOTE
" [facility]   Vaginal Delivery Note    Patient Name: Tea Nelson  : 1990  MRN: [id number]    Date of Delivery: 10/18/2024    Diagnosis     Pre & Post-Delivery:  Intrauterine pregnancy at 39w1d  Labor status: Induced Onset of Labor    Pregnancy     (spontaneous vaginal delivery)             Problem List    Transfer to Postpartum     Review the Delivery Report for details.     Delivery     Delivery: Vaginal, Spontaneous    YOB: 2026   Time of Birth:  Gestational Age 1:02 PM  39w1d     Anesthesia: Epidural    Delivering clinician: Georgia QUIGLEY Foster   Forceps?   No   Vacuum? No    Shoulder dystocia present: No        Delivery narrative:  Sheba progressed to C+P and pushed effectively. Mouth and nose bulb suctioned on perineum.  Anterior shoulder was delivered easily with gentle traction and maternal effort followed by posterior shoulder.  viable female. Infant stimulated, dried, and placed on mom's abdomen. Infant with lusty cry and spontaneous respirations. Delayed cord double clamped and cut. Cord blood obtained. Placenta delivered Schultze intact with 3V cord. Pitocin 20 units IV given. Vagina, perineum, and labia inspected and no lacerations noted. FF @ U, light rubra lochia.       Infant     Findings: female infant     Infant observations: Weight: 4108 g (9 lb 0.9 oz)  Length: 21 in  Observations/Comments:        Apgars: 8  @ 1 minute /    9  @ 5 minutes   Infant Name: Layton     Placenta & Cord         Placenta delivered  Spontaneous at   10/18/2024  1:07 PM    Cord: 3 vessels present.   Nuchal Cord?  no   Cord blood obtained: Yes   Cord gases obtained:  No   Cord gas results: Venous:  No results found for: \"PHCVEN\", \"BECVEN\"    Arterial:  No results found for: \"PHCART\", \"BECART\"     Repair     Episiotomy: None    No    Lacerations: No   Estimated Blood Loss: 200 ml     Quantitative Blood Loss:    QBL from VAG DEL: 50 (10/18/24 1347)     Complications "     none    Disposition     Mother to Mother Baby/Postpartum  in stable condition currently.  Baby to remains with mom  in stable condition currently.    Georgia Ortiz CNM  10/18/24  14:53 EDT

## 2024-10-18 NOTE — PAYOR COMM NOTE
"DELIVERY NOTICE    To:  [facility]  From: Antonino Alvarado RN  Phone: [phone]  Fax: [phone]  NPI: [id number]  TIN: [id number]    _P: 5   EDC 10/24/24 @ 39w1d  DELIVERED: 10/18/24@ 1302.  Vaginal Female  9#0.9OZ.; 4108GMS; APGAR 8/9      Tea Gee \"SANDER\" (34 y.o. Female)       Date of Birth   1990    Social Security Number       Address   [address] [place] [address]    Home Phone   [phone]    MRN   [id number]       Holiness   Faith    Marital Status                               Admission Date   10/17/24    Admission Type   Elective    Admitting Provider   Georgia Ortiz CNM    Attending Provider   Georgia Ortiz CNM    Department, Room/Bed   [facility],        Discharge Date       Discharge Disposition       Discharge Destination                                 Attending Provider: Georgia Ortiz CNM    Allergies: Ceclor [Cefaclor]    Isolation: None   Infection: None   Code Status: CPR    Ht: 163.8 cm (64.5\")   Wt: 114 kg (251 lb 4.8 oz)    Admission Cmt: None   Principal Problem: None                  Active Insurance as of 10/17/2024       Primary Coverage       Payor Plan Insurance Group Employer/Plan Group    [employer] [place] [employer] [id number]       Payor Plan Address Payor Plan Phone Number Payor Plan Fax Number Effective Dates    [address] [address] [phone]  10/1/2015 - None Entered    [facility] [id number]         Subscriber Name Subscriber Birth Date Member ID       TEA GEE 1990 [id number]                     Emergency Contacts        (Rel.) Home Phone Work Phone Mobile Phone    ALIX GEE (Spouse) [phone] -- --              Maternal Vitals (last day)       Date/Time Temp Pulse Resp BP SpO2 Weight    10/18/24 1415 -- 120 -- 128/72 -- --    10/18/24 1400 -- 97 -- 111/62 -- --    10/18/24 1332 -- 96 -- 97/67 -- --    10/18/24 1249 -- 159 -- 80/57 -- --    10/18/24 " 1215 -- 82 -- 109/63 100 --    10/18/24 1156 -- 86 -- 103/83 -- --    10/18/24 1150 -- 88 -- -- 100 --    10/18/24 1116 -- 72 -- 88/44 -- --    10/18/24 1115 -- 75 -- -- 100 --    10/18/24 1101 98.1 (36.7) -- -- -- -- --    10/18/24 1050 -- 74 -- -- 100 --    10/18/24 1046 -- 78 -- 95/55 -- --    10/18/24 1031 -- 101 -- 98/67 -- --    10/18/24 1030 -- 123 -- -- 100 --    10/18/24 1000 -- 89 -- 105/52 -- --    10/18/24 0931 -- 88 -- 115/55 -- --    10/18/24 0930 -- 97 -- -- 100 --    10/18/24 0929 -- 90 -- 89/65 -- --    10/18/24 0925 -- 107 -- 117/54 99 --    10/18/24 0922 -- 100 -- 127/78 -- --    10/18/24 0920 -- 97 -- -- 99 --    10/18/24 0919 -- 92 -- 115/59 -- --    10/18/24 0916 -- 90 -- 121/55 -- --    10/18/24 0915 -- 106 -- -- 100 --    10/18/24 0913 -- 88 -- 115/70 -- --    10/18/24 0910 -- 108 -- 120/78 99 --    10/18/24 0908 -- 87 -- 116/51 -- --    10/18/24 0905 -- 99 -- -- 100 --    10/18/24 0901 -- 107 -- 122/74 -- --    10/18/24 0900 -- 108 -- -- 99 --    10/18/24 0850 -- 103 -- -- 98 --    10/18/24 0846 -- 92 -- 114/71 -- --    10/18/24 0845 -- 95 -- -- 100 --    10/18/24 0835 -- 93 -- -- 98 --    10/18/24 0831 -- 100 -- 113/57 -- --    10/18/24 0825 -- 98 -- -- 97 --    10/18/24 0824 -- 93 -- 114/86 -- --    10/18/24 0801 -- 91 -- -- -- --    10/18/24 0750 -- 91 -- -- 100 --    10/18/24 0746 -- 88 -- 122/76 -- --    10/18/24 0745 -- 114 -- -- 99 --    10/18/24 0740 98.4 (36.9) 97 18 -- -- --    10/18/24 0735 -- 87 -- -- 99 --    10/18/24 0731 -- 90 -- 117/58 -- --    10/18/24 0720 -- 104 -- -- 100 --    10/18/24 0716 -- 90 -- 110/71 -- --    10/18/24 0705 -- 92 -- 88/68 98 --    10/18/24 0655 -- 92 -- -- 99 --    10/18/24 0645 -- 92 -- 83/60 99 --    10/18/24 0630 -- 108 -- 110/41 97 --    10/18/24 0625 -- 96 -- -- 98 --    10/18/24 0620 -- 93 -- -- 97 --    10/18/24 0600 -- 93 -- 111/76 96 --    10/18/24 0555 -- 103 -- -- 97 --    10/18/24 0530 -- 85 -- 108/62 97 --    10/18/24 0525 -- 81 -- -- 97  --    10/18/24 0500 -- 98 -- 95/46 98 --    10/18/24 0430 -- 79 -- 94/41 96 --    10/18/24 0400 98.4 (36.9) 76 16 107/44 97 --    10/18/24 0330 -- 78 -- 96/33 98 --    10/18/24 0300 -- 81 -- 113/65 96 --    10/18/24 0230 -- 81 -- 111/65 94 --    10/18/24 0200 -- 82 -- 116/62 96 --    10/18/24 0130 -- 88 -- 120/66 96 --    10/18/24 0115 -- 79 -- -- 94 --    10/18/24 0100 -- 82 -- 115/60 95 --    10/18/24 0030 -- 88 -- 115/62 97 --    10/18/24 0015 -- 87 -- 118/68 97 --    10/18/24 0000 99.4 (37.4) 84 14 114/64 95 --    10/17/24 2345 -- 84 -- 114/82 94 --    10/17/24 2330 -- 81 -- 109/63 95 --    10/17/24 2315 -- 87 -- 119/73 97 --    10/17/24 2300 -- 91 -- 105/70 96 --    10/17/24 2245 -- 89 -- 121/55 -- --    10/17/24 2230 -- 89 -- 115/62 95 --    10/17/24 2215 -- 102 -- 107/95 98 --    10/17/24 2200 -- 97 -- 112/85 100 --    10/17/24 2146 -- 111 -- 145/94 98 --    10/17/24 2145 -- 96 -- -- -- --    10/17/24 2130 -- 100 -- 137/65 99 --    10/17/24 2121 -- 98 -- 132/75 -- --    10/17/24 2115 -- 100 -- -- 99 --    10/17/24 2105 -- 96 -- 117/51 98 --    10/17/24 2100 -- 99 -- 134/112 -- --    10/17/24 2047 -- 88 -- 155/141 -- --    10/17/24 2030 -- 89 -- 102/43 98 --    10/17/24 2015 -- 107 -- 106/57 98 --    10/17/24 2000 98.2 (36.8) 95 -- 118/78 98 --    10/17/24 1945 -- 98 -- 107/72 98 --    10/17/24 1930 -- 95 -- 126/59 99 --    10/17/24 1924 -- 85 18 126/77 -- --    10/17/24 1915 -- 94 -- -- 98 --    10/17/24 1845 -- 97 -- -- 97 --    10/17/24 1830 -- 105 -- -- 97 --    10/17/24 1825 -- 99 -- -- 96 --    10/17/24 1820 -- 105 -- -- 98 --    10/17/24 1815 -- 107 -- -- 96 --    10/17/24 1810 -- 103 -- -- 97 --    10/17/24 1800 -- 101 -- -- 97 --    10/17/24 1740 -- 101 -- -- 97 --    10/17/24 1620 98.7 (37.1) 130 18 121/70 -- 114 (251.3)          FHR (last day)        Date/Time Fetal HR Assessment Method Fetal HR (beats/min) Fetal HR Baseline Fetal HR Variability Fetal HR Accelerations Fetal HR Decelerations Fetal HR  Tracing Category    10/18/24 1300 external  140  normal range  moderate (amplitude range 6 to 25 bpm)  greater than/equal to 15 bpm;lasting at least 15 seconds  early;recurrent  --     10/18/24 1230 external  145  normal range  moderate (amplitude range 6 to 25 bpm)  greater than/equal to 15 bpm;lasting at least 15 seconds  variable  --     10/18/24 1200 external  145  normal range  moderate (amplitude range 6 to 25 bpm)  greater than/equal to 15 bpm;lasting at least 15 seconds  variable  --     10/18/24 1130 external  145  normal range  moderate (amplitude range 6 to 25 bpm)  absent  variable  --     10/18/24 1100 external  145  normal range  moderate (amplitude range 6 to 25 bpm)  absent  --  --     10/18/24 1030 external  150  normal range  minimal (detectable, amplitude less than or equal to 5 bpm)  absent  episodic;early  --     10/18/24 1000 external  155  normal range  moderate (amplitude range 6 to 25 bpm)  absent  episodic;late  --     10/18/24 0930 external  160  normal range  moderate (amplitude range 6 to 25 bpm)  lasting at least 15 seconds;greater than/equal to 15 bpm  absent  --     10/18/24 0900 external  160  normal range  moderate (amplitude range 6 to 25 bpm)  lasting at least 15 seconds;greater than/equal to 15 bpm  absent  --     10/18/24 0830 external  155  normal range  minimal (detectable, amplitude less than or equal to 5 bpm)  lasting at least 15 seconds;greater than/equal to 15 bpm  absent  --     10/18/24 0800 external  145  normal range  moderate (amplitude range 6 to 25 bpm)  lasting at least 15 seconds;greater than/equal to 15 bpm  absent  --     10/18/24 0730 external  140  normal range  moderate (amplitude range 6 to 25 bpm)  lasting at least 15 seconds;greater than/equal to 15 bpm  absent  --     10/18/24 0700 external  140  normal range  moderate (amplitude range 6 to 25 bpm)  lasting at least 15 seconds;greater than/equal to 15 bpm  absent  --     10/18/24 0630 external  135   normal range  moderate (amplitude range 6 to 25 bpm)  lasting at least 15 seconds;greater than/equal to 15 bpm  absent  --     10/18/24 0600 external  130  normal range  moderate (amplitude range 6 to 25 bpm)  lasting at least 15 seconds;greater than/equal to 15 bpm  absent  --     10/18/24 0530 external  135  normal range  moderate (amplitude range 6 to 25 bpm)  lasting at least 15 seconds;greater than/equal to 15 bpm  absent  --     10/18/24 0500 external  130  normal range  moderate (amplitude range 6 to 25 bpm)  lasting at least 15 seconds;greater than/equal to 15 bpm  absent  --     10/18/24 0430 external  125  normal range  moderate (amplitude range 6 to 25 bpm)  lasting at least 15 seconds;greater than/equal to 15 bpm  absent  --     10/18/24 0400 external  130  normal range  moderate (amplitude range 6 to 25 bpm)  lasting at least 15 seconds;greater than/equal to 15 bpm  absent  --     10/18/24 0330 external  130  normal range  moderate (amplitude range 6 to 25 bpm)  lasting at least 15 seconds;greater than/equal to 15 bpm  absent  --     10/18/24 0300 external  125  normal range  moderate (amplitude range 6 to 25 bpm)  lasting at least 15 seconds;greater than/equal to 15 bpm  absent  --     10/18/24 0230 external  130  normal range  moderate (amplitude range 6 to 25 bpm)  lasting at least 15 seconds;greater than/equal to 15 bpm  absent  --     10/18/24 0200 external  130  normal range  moderate (amplitude range 6 to 25 bpm)  lasting at least 15 seconds;greater than/equal to 15 bpm  absent  --     10/18/24 0130 external  130  normal range  moderate (amplitude range 6 to 25 bpm)  lasting at least 15 seconds;greater than/equal to 15 bpm  absent  --     10/18/24 0100 external  130  normal range  moderate (amplitude range 6 to 25 bpm)  lasting at least 15 seconds;greater than/equal to 15 bpm  absent  --     10/18/24 0030 external  120  normal range  moderate (amplitude range 6 to 25 bpm)  lasting at least 15  seconds;greater than/equal to 15 bpm  absent  --     10/18/24 0000 external  125  normal range  moderate (amplitude range 6 to 25 bpm)  lasting at least 15 seconds;greater than/equal to 15 bpm  absent  --     10/17/24 2330 external  130  normal range  moderate (amplitude range 6 to 25 bpm)  absent  absent  --     10/17/24 2300 external  135  normal range  moderate (amplitude range 6 to 25 bpm)  greater than/equal to 15 bpm;lasting at least 15 seconds  absent  --     10/17/24 2230 external  130  normal range  moderate (amplitude range 6 to 25 bpm)  greater than/equal to 15 bpm;lasting at least 15 seconds  absent  --     10/17/24 2200 external  135  normal range  moderate (amplitude range 6 to 25 bpm)  greater than/equal to 15 bpm;lasting at least 15 seconds  absent  --     10/17/24 2130 external  130  normal range  moderate (amplitude range 6 to 25 bpm)  greater than/equal to 15 bpm;lasting at least 15 seconds  absent  --     10/17/24 2100 external  130  normal range  moderate (amplitude range 6 to 25 bpm)  greater than/equal to 15 bpm;lasting at least 15 seconds  absent  --     10/17/24 2030 external  130  normal range  moderate (amplitude range 6 to 25 bpm)  greater than/equal to 15 bpm;lasting at least 15 seconds  absent  --     10/17/24 2000 external  135  normal range  moderate (amplitude range 6 to 25 bpm)  greater than/equal to 15 bpm;lasting at least 15 seconds  absent  --     10/17/24 1930 external  135  normal range  moderate (amplitude range 6 to 25 bpm)  greater than/equal to 15 bpm;lasting at least 15 seconds  absent  --     10/17/24 1900 external  155  tachycardia  moderate (amplitude range 6 to 25 bpm)  greater than/equal to 15 bpm;lasting at least 15 seconds  absent  --     10/17/24 1830 external  160  tachycardia  moderate (amplitude range 6 to 25 bpm)  greater than/equal to 15 bpm;lasting at least 15 seconds  absent  --     10/17/24 1800 external  165  very broken charting noted  tachycardia   moderate (amplitude range 6 to 25 bpm)  greater than/equal to 15 bpm;lasting at least 15 seconds  absent  --     10/17/24 1730 external  170  tachycardia  moderate (amplitude range 6 to 25 bpm)  greater than/equal to 15 bpm;lasting at least 15 seconds  absent  --     10/17/24 1700 external  175  tachycardia  moderate (amplitude range 6 to 25 bpm)  greater than/equal to 15 bpm;lasting at least 15 seconds  absent  --     10/17/24 1630 external  150  broken tracing noted  normal range  moderate (amplitude range 6 to 25 bpm)  greater than/equal to 15 bpm;lasting at least 15 seconds  --  --                   Uterine Activity (last day)        Date/Time Method Contraction Frequency (Minutes) Contraction Duration (sec) Contraction Intensity Uterine Resting Tone Contraction Pattern    10/18/24 1300 external tocotransducer;per patient report;palpation  2  50-80  moderate by palpation  soft by palpation  --     10/18/24 1230 external tocotransducer;per patient report;palpation  2-3  40-70  moderate by palpation  soft by palpation  --     10/18/24 1200 external tocotransducer;per patient report;palpation  2-3  40-50  moderate by palpation  soft by palpation  --     10/18/24 1130 external tocotransducer;per patient report;palpation  3-4  40-50  moderate by palpation  soft by palpation  --     10/18/24 1100 external tocotransducer;per patient report;palpation  2-5  50-60  mild by palpation  soft by palpation  --     10/18/24 1030 external tocotransducer;per patient report;palpation  2-5  50-60  mild by palpation  soft by palpation  --     10/18/24 1000 external tocotransducer;per patient report;palpation  2-4  50-60  mild by palpation  soft by palpation  --     10/18/24 0930 external tocotransducer;per patient report;palpation  2  --  mild by palpation  soft by palpation  --     10/18/24 0900 external tocotransducer;per patient report;palpation  2-4  50-60  mild by palpation  soft by palpation  --     10/18/24 0830 external  tocotransducer;per patient report;palpation  4-5  50-60  mild by palpation  soft by palpation  --     10/18/24 0800 external tocotransducer;per patient report;palpation  2-4  50-60  mild by palpation  soft by palpation  --     10/18/24 0730 external tocotransducer;per patient report;palpation  2-5  50-70  mild by palpation  soft by palpation  --     10/18/24 0700 external tocotransducer;per patient report;palpation  --  --  mild by palpation  soft by palpation  --     10/18/24 0630 external tocotransducer;per patient report;palpation  --  --  mild by palpation  soft by palpation  --     10/18/24 0600 external tocotransducer;per patient report;palpation  --  --  mild by palpation  soft by palpation  --     10/18/24 0530 external tocotransducer;per patient report;palpation  --  --  mild by palpation  soft by palpation  --     10/18/24 0500 external tocotransducer;per patient report;palpation  --  --  mild by palpation  soft by palpation  --     10/18/24 0430 external tocotransducer;per patient report;palpation  --  --  mild by palpation  soft by palpation  --     10/18/24 0400 external tocotransducer;per patient report;palpation  --  --  no contractions  soft by palpation  --     10/18/24 0330 external tocotransducer;per patient report;palpation  --  --  no contractions  soft by palpation  --     10/18/24 0300 external tocotransducer  --  --  no contractions  soft by palpation  --     10/18/24 0230 external tocotransducer  --  --  mild by palpation  soft by palpation  --     10/18/24 0200 external tocotransducer  --  --  mild by palpation  soft by palpation  --     10/18/24 0130 external tocotransducer  --  --  mild by palpation  soft by palpation  --     10/18/24 0100 external tocotransducer  --  --  no contractions  soft by palpation  --     10/18/24 0030 external tocotransducer  --  --  no contractions  soft by palpation  --     10/18/24 0000 external tocotransducer  --  --  mild by palpation  soft by palpation  --      10/17/24 2330 external tocotransducer  --  --  mild by palpation  soft by palpation  --     10/17/24 2300 external tocotransducer  --  --  mild by palpation  soft by palpation  --     10/17/24 2230 external tocotransducer  --  --  mild by palpation  soft by palpation  --     10/17/24 2200 external tocotransducer  --  --  mild by palpation  soft by palpation  --     10/17/24 2130 external tocotransducer  --  --  mild by palpation  soft by palpation  --     10/17/24 2100 external tocotransducer  --  --  no contractions  --  --     10/17/24 2030 external tocotransducer  --  --  no contractions  --  --     10/17/24 2000 external tocotransducer  --  --  no contractions  --  --     10/17/24 1930 external tocotransducer  X 1  40  --  --  --     10/17/24 1900 external tocotransducer;palpation  --  --  no contractions  soft by palpation  --     10/17/24 1830 external tocotransducer;palpation  --  --  no contractions  soft by palpation  --     10/17/24 1800 external tocotransducer;palpation  --  --  no contractions  soft by palpation  --     10/17/24 1730 external tocotransducer;palpation  --  80  mild by palpation  soft by palpation  --     10/17/24 1700 external tocotransducer;palpation  --  --  --  soft by palpation  --     10/17/24 1630 external tocotransducer;palpation  --  --  no contractions  soft by palpation  --                   Labor Pain (last day)        Date/Time (0-10) Pain Rating: Rest (0-10) Pain Rating: Activity Pain Management Interventions    10/18/24 1000 0  0  --     10/18/24 0929 0  --  --     10/18/24 0400 2  2  position adjusted;no interventions per patient request     10/18/24 0000 0  0  --     10/17/24 2000 0  0  --     10/17/24 1645 0  0  --

## 2024-10-19 ENCOUNTER — PREP FOR SURGERY (OUTPATIENT)
Dept: OTHER | Facility: HOSPITAL | Age: 34
End: 2024-10-19
Payer: COMMERCIAL

## 2024-10-19 VITALS
OXYGEN SATURATION: 98 % | HEIGHT: 65 IN | WEIGHT: 251.3 LBS | DIASTOLIC BLOOD PRESSURE: 67 MMHG | TEMPERATURE: 98.1 F | BODY MASS INDEX: 41.87 KG/M2 | HEART RATE: 82 BPM | SYSTOLIC BLOOD PRESSURE: 120 MMHG | RESPIRATION RATE: 16 BRPM

## 2024-10-19 DIAGNOSIS — Z30.2 REQUEST FOR STERILIZATION: Primary | ICD-10-CM

## 2024-10-19 PROBLEM — Z3A.39 39 WEEKS GESTATION OF PREGNANCY: Status: ACTIVE | Noted: 2024-10-17

## 2024-10-19 LAB
BASOPHILS # BLD AUTO: 0.07 10*3/MM3 (ref 0–0.2)
BASOPHILS NFR BLD AUTO: 0.6 % (ref 0–1.5)
DEPRECATED RDW RBC AUTO: 41.5 FL (ref 37–54)
EOSINOPHIL # BLD AUTO: 0.52 10*3/MM3 (ref 0–0.4)
EOSINOPHIL NFR BLD AUTO: 4.5 % (ref 0.3–6.2)
ERYTHROCYTE [DISTWIDTH] IN BLOOD BY AUTOMATED COUNT: 12.6 % (ref 12.3–15.4)
HCT VFR BLD AUTO: 34.8 % (ref 34–46.6)
HGB BLD-MCNC: 11.4 G/DL (ref 12–15.9)
IMM GRANULOCYTES # BLD AUTO: 0.06 10*3/MM3 (ref 0–0.05)
IMM GRANULOCYTES NFR BLD AUTO: 0.5 % (ref 0–0.5)
LYMPHOCYTES # BLD AUTO: 2.77 10*3/MM3 (ref 0.7–3.1)
LYMPHOCYTES NFR BLD AUTO: 23.7 % (ref 19.6–45.3)
MCH RBC QN AUTO: 29.9 PG (ref 26.6–33)
MCHC RBC AUTO-ENTMCNC: 32.8 G/DL (ref 31.5–35.7)
MCV RBC AUTO: 91.3 FL (ref 79–97)
MONOCYTES # BLD AUTO: 0.65 10*3/MM3 (ref 0.1–0.9)
MONOCYTES NFR BLD AUTO: 5.6 % (ref 5–12)
NEUTROPHILS NFR BLD AUTO: 65.1 % (ref 42.7–76)
NEUTROPHILS NFR BLD AUTO: 7.61 10*3/MM3 (ref 1.7–7)
NRBC BLD AUTO-RTO: 0 /100 WBC (ref 0–0.2)
PLATELET # BLD AUTO: 260 10*3/MM3 (ref 140–450)
PMV BLD AUTO: 10.8 FL (ref 6–12)
RBC # BLD AUTO: 3.81 10*6/MM3 (ref 3.77–5.28)
WBC NRBC COR # BLD AUTO: 11.68 10*3/MM3 (ref 3.4–10.8)

## 2024-10-19 PROCEDURE — 85025 COMPLETE CBC W/AUTO DIFF WBC: CPT | Performed by: MIDWIFE

## 2024-10-19 PROCEDURE — 0503F POSTPARTUM CARE VISIT: CPT | Performed by: STUDENT IN AN ORGANIZED HEALTH CARE EDUCATION/TRAINING PROGRAM

## 2024-10-19 RX ORDER — IBUPROFEN 800 MG/1
800 TABLET, FILM COATED ORAL EVERY 6 HOURS PRN
Qty: 30 TABLET | Refills: 0 | Status: SHIPPED | OUTPATIENT
Start: 2024-10-19

## 2024-10-19 RX ORDER — SODIUM CHLORIDE 9 MG/ML
40 INJECTION, SOLUTION INTRAVENOUS AS NEEDED
OUTPATIENT
Start: 2024-10-19 | End: 2024-10-19

## 2024-10-19 RX ORDER — SODIUM CHLORIDE 0.9 % (FLUSH) 0.9 %
10 SYRINGE (ML) INJECTION AS NEEDED
OUTPATIENT
Start: 2024-10-19

## 2024-10-19 RX ORDER — SODIUM CHLORIDE 0.9 % (FLUSH) 0.9 %
3 SYRINGE (ML) INJECTION EVERY 12 HOURS SCHEDULED
OUTPATIENT
Start: 2024-10-19

## 2024-10-19 RX ORDER — ACETAMINOPHEN 500 MG
1000 TABLET ORAL EVERY 6 HOURS PRN
Qty: 60 TABLET | Refills: 0 | Status: SHIPPED | OUTPATIENT
Start: 2024-10-19

## 2024-10-19 RX ADMIN — PRENATAL VITAMINS-IRON FUMARATE 27 MG IRON-FOLIC ACID 0.8 MG TABLET 1 TABLET: at 12:25

## 2024-10-19 RX ADMIN — IBUPROFEN 600 MG: 600 TABLET ORAL at 05:02

## 2024-10-19 RX ADMIN — HYDROCODONE BITARTRATE AND ACETAMINOPHEN 1 TABLET: 10; 325 TABLET ORAL at 05:02

## 2024-10-19 NOTE — DISCHARGE SUMMARY
Discharge Summary    [facility]  Tea Nelson  : 1990  MRN: [id number]  [facility]: [id number]    Date of Admission: 10/17/2024   Date of Discharge:  10/19/2024   Delivering Physician: Georgia QUIGLEY Foster       Admission Diagnosis: Intrauterine pregnancy at 39w0d  Advanced maternal age   Discharge Diagnosis: Same as above plus  Pregnancy at 39w1d - delivered       Procedures: 10/18/2024 - Vaginal, Spontaneous      Hospital Course  Patient is a 34 y.o.  who had a vaginal birth at 39w1d. Her postpartum course was without complications. On PPD # 1, she was ready for discharge. She was hemodynamically stable, had normal lochia, and her pain was well controlled with oral medications. She was bottle feeding without concerns. She plans an interval tubal ligation for contraception.    Infant  female fetus weighing 4108 g (9 lb 0.9 oz)  Apgars -  8 @ 1 minute /  9 @ 5 minutes.    Discharge Labs  Lab Results   Component Value Date    WBC 11.68 (H) 10/19/2024    HGB 11.4 (L) 10/19/2024    HCT 34.8 10/19/2024     10/19/2024     Discharge Medications     Discharge Medications        New Medications        Instructions Start Date   acetaminophen 500 MG tablet  Commonly known as: TYLENOL   1,000 mg, Oral, Every 6 Hours PRN      ibuprofen 800 MG tablet  Commonly known as: ADVIL,MOTRIN   800 mg, Oral, Every 6 Hours PRN             Continue These Medications        Instructions Start Date   glucose monitor monitoring kit   1 each, Does not apply, As Needed      Lancets Thin misc   Fasting and 1 hour after meals (4 times daily)      OneTouch Verio Reflect w/Device kit       prenatal vitamin 28-0.8 28-0.8 MG tablet tablet   1 tablet, Oral, Daily             Stop These Medications      ferrous sulfate 324 (65 Fe) MG tablet delayed-release EC tablet     glucose blood test strip     pantoprazole 40 MG EC tablet  Commonly known as: PROTONIX     promethazine 25 MG tablet  Commonly known as: PHENERGAN     Unisom  SleepTabs 25 MG tablet  Generic drug: doxylamine            Discharge Disposition: Home or Self Care   Condition on Discharge: Good   Follow-up: 6 weeks with TIGIST Alcantara MD  10/19/2024

## 2024-10-19 NOTE — PROGRESS NOTES
Tavo Nelson  : 1990  MRN: [id number]  CSN: [id number]    Postpartum Day #1  Subjective   Her pain is well controlled. Vaginal bleeding is appropriate. She is tolerating oral intake. She is ambulatory. She is voiding spontaneously. She is bottle feeding without concern.     Objective     Min/max vitals past 24 hours:   Temp  Min: 97.8 °F (36.6 °C)  Max: 98.6 °F (37 °C)  BP  Min: 80/57  Max: 132/84  Pulse  Min: 69  Max: 159  Resp  Min: 16  Max: 16        General: Well developed, well nourished and in no acute distress   Abdomen: Soft, non-tender, no masses and fundus firm and non-tender   Pelvic: Not performed   Ext: Non-tender, not edematous     Lab Results   Component Value Date    WBC 11.68 (H) 10/19/2024    HGB 11.4 (L) 10/19/2024    HCT 34.8 10/19/2024    MCV 91.3 10/19/2024     10/19/2024    ABORH O Positive 2021    RUBELLAABIGG 1.70 2024    HEPBSAG Negative 2024        Assessment & Plan    Postpartum Day #1 S/P spontaneous vaginal delivery  Continue routine postpartum care  Encourage ambulation  Plans BTL for contraception - will place case request and have  call  Pre-e precautions reviewed    D/c with 6 wk PP visit or sooner as needed.    Genet Alcantara MD  10/19/2024  10:21 EDT

## 2024-10-19 NOTE — PLAN OF CARE
Goal Outcome Evaluation:              Outcome Evaluation: VSS, adequate I/O, pain managed with ordered medication, lochia WDL, walking without difficulty, positive bonding with infant observed

## 2024-11-01 ENCOUNTER — TELEPHONE (OUTPATIENT)
Dept: OBSTETRICS AND GYNECOLOGY | Facility: CLINIC | Age: 34
End: 2024-11-01

## 2024-11-01 NOTE — TELEPHONE ENCOUNTER
"  Caller: Tea Nelson \"SANEDR\"    Relationship to patient: Self    Best call back number: [phone]  Chief complaint: NEEDS FRIDAY APPT     Type of visit: 6 WEEK POSTPARTUM     Requested date: 11/22/24       Additional notes:OFFERED NEXT AVAILABLE APPT WITH ZEUS 11/25/24 BUT PT STATES SHE NEEDS FRIDAY          "

## 2024-12-06 ENCOUNTER — POSTPARTUM VISIT (OUTPATIENT)
Dept: OBSTETRICS AND GYNECOLOGY | Facility: CLINIC | Age: 34
End: 2024-12-06
Payer: COMMERCIAL

## 2024-12-06 VITALS
SYSTOLIC BLOOD PRESSURE: 124 MMHG | BODY MASS INDEX: 39.03 KG/M2 | WEIGHT: 228.6 LBS | DIASTOLIC BLOOD PRESSURE: 78 MMHG | HEIGHT: 64 IN

## 2024-12-06 DIAGNOSIS — Z30.017 ENCOUNTER FOR INITIAL PRESCRIPTION OF IMPLANTABLE SUBDERMAL CONTRACEPTIVE: Primary | ICD-10-CM

## 2024-12-06 PROBLEM — Z30.2 REQUEST FOR STERILIZATION: Status: RESOLVED | Noted: 2024-08-14 | Resolved: 2024-12-06

## 2024-12-06 PROBLEM — Z3A.39 39 WEEKS GESTATION OF PREGNANCY: Status: RESOLVED | Noted: 2024-10-17 | Resolved: 2024-12-06

## 2024-12-06 PROBLEM — O09.522 MULTIGRAVIDA OF ADVANCED MATERNAL AGE IN SECOND TRIMESTER: Status: RESOLVED | Noted: 2024-04-05 | Resolved: 2024-12-06

## 2024-12-06 PROBLEM — O09.899 SHORT INTERVAL BETWEEN PREGNANCIES AFFECTING PREGNANCY, ANTEPARTUM: Status: RESOLVED | Noted: 2024-08-14 | Resolved: 2024-12-06

## 2024-12-06 NOTE — PROGRESS NOTES
"History  Chief Complaint   Patient presents with    Postpartum Care     6 week PP.         Tea Nelson is a 34 y.o. year old  presenting to be seen for her postpartum visit.  She had a vaginal delivery.    Since delivery she has been sexually active. She has used condoms.  She does not have concerns about post-partum blues/depression.   She is bottle feeding.  For ongoing contraception, her plans are Nexplanon. She decided against a tubal  She has had a menstrual cycle. She just finished.         Physical  /78   Ht 162.6 cm (64\")   Wt 104 kg (228 lb 9.6 oz)   LMP 2024   Breastfeeding No   BMI 39.24 kg/m²     General:  well developed; well nourished  no acute distress  mentation appropriate   Abdomen: soft, non-tender; no masses   Pelvis: Not performed.        Assessment   Normal 6 week postpartum exam  Contraceptive management     Plan   BC options reviewed and compared today: Nexplanon  Pap smear , bleeding today  Follow up for   Nexplanon insertion with menses    No orders of the defined types were placed in this encounter.         This note was electronically signed.  Georgia Ortiz, YO  2024  "

## 2025-01-07 ENCOUNTER — TELEPHONE (OUTPATIENT)
Dept: SURGERY | Facility: OTHER | Age: 35
End: 2025-01-07

## 2025-01-07 NOTE — TELEPHONE ENCOUNTER
"  Caller: Tea Nelson \"SANDER\"    Relationship to patient: Self    Best call back number: [phone]    Chief complaint: NEXPLANON INSERTION    Type of visit: NEXPLANON INSERTION    Requested date: ASAP     If rescheduling, when is the original appointment: N/A     Additional notes:PT IS CURRENTLY ON HER CYCLE - PLEASE CALL TO SCHEDULE     "

## 2025-01-08 ENCOUNTER — TELEPHONE (OUTPATIENT)
Dept: OBSTETRICS AND GYNECOLOGY | Facility: CLINIC | Age: 35
End: 2025-01-08
Payer: COMMERCIAL

## 2025-01-08 NOTE — TELEPHONE ENCOUNTER
Patient stated her periods are irregular and wanted to know If you could call her in some BC patches?

## 2025-01-09 RX ORDER — NORELGESTROMIN AND ETHINYL ESTRADIOL 35; 150 UG/MG; UG/MG
1 PATCH TRANSDERMAL WEEKLY
Qty: 3 PATCH | Refills: 11 | Status: SHIPPED | OUTPATIENT
Start: 2025-01-09

## 2025-02-05 ENCOUNTER — TELEPHONE (OUTPATIENT)
Dept: OBSTETRICS AND GYNECOLOGY | Facility: CLINIC | Age: 35
End: 2025-02-05

## 2025-02-05 NOTE — TELEPHONE ENCOUNTER
Patient had an appointment for nexplanon today, has tried for a while to have this inserted for a while now. She will be off her period by tomorrow and would like to have HCG the day of ?      Thank You

## 2025-02-07 DIAGNOSIS — Z30.017 ENCOUNTER FOR INITIAL PRESCRIPTION OF IMPLANTABLE SUBDERMAL CONTRACEPTIVE: Primary | ICD-10-CM

## 2025-02-11 ENCOUNTER — OFFICE VISIT (OUTPATIENT)
Dept: OBSTETRICS AND GYNECOLOGY | Facility: CLINIC | Age: 35
End: 2025-02-11
Payer: COMMERCIAL

## 2025-02-11 ENCOUNTER — LAB (OUTPATIENT)
Dept: LAB | Facility: HOSPITAL | Age: 35
End: 2025-02-11
Payer: COMMERCIAL

## 2025-02-11 VITALS — SYSTOLIC BLOOD PRESSURE: 120 MMHG | DIASTOLIC BLOOD PRESSURE: 62 MMHG

## 2025-02-11 DIAGNOSIS — Z30.017 NEXPLANON INSERTION: Primary | ICD-10-CM

## 2025-02-11 PROCEDURE — 84702 CHORIONIC GONADOTROPIN TEST: CPT | Performed by: STUDENT IN AN ORGANIZED HEALTH CARE EDUCATION/TRAINING PROGRAM

## 2025-02-11 RX ORDER — DOXYLAMINE SUCCINATE 25 MG/1
25 TABLET ORAL NIGHTLY PRN
COMMUNITY
Start: 2025-01-28

## 2025-02-11 RX ORDER — ETONOGESTREL 68 MG/1
IMPLANT SUBCUTANEOUS
COMMUNITY
Start: 2024-12-10

## 2025-02-11 RX ORDER — TRAZODONE HYDROCHLORIDE 50 MG/1
50 TABLET, FILM COATED ORAL NIGHTLY
Qty: 90 TABLET | Refills: 3 | Status: SHIPPED | OUTPATIENT
Start: 2025-02-11

## 2025-02-11 NOTE — PROGRESS NOTES
Nexplanon Insertion    No LMP recorded.    Date of procedure:  2/11/2025    Risks and benefits discussed? Yes  All questions answered? Yes  Consents given by The patient  Written consent obtained? Yes    Local anesthesia used:  Yes - 3 cc's of 1% lidocaine with epinephrine    Procedure documentation:    The upper left arm (non-dominant) was marked at the intended site of insertion. Betadine was used to cleanse the skin. Local anesthesia was injected. The Nexplanon was placed subdermally without difficulty. The device was able to be palpated in the arm. Steri-strips were then placed across the site of insertion and the arm was wrapped.    She tolerated the procedure well. There were no complications. EBL was minimal.    Post procedure instructions: Remove the wrapping in 24 hours and the steri-strips in 3-4 days. The patient has been advised to contact the office if she develops fever, redness, swelling, pain, or discharge occurs at the procedure site. She has been counseled on the effectiveness of her contraception as well.    Follow up needed: RTC for annual with Leilani Alcantara MD   Obstetrics and Gynecology  [facility]

## 2025-04-21 RX ORDER — DOXYLAMINE SUCCINATE 25 MG/1
25 TABLET ORAL NIGHTLY PRN
Qty: 30 TABLET | OUTPATIENT
Start: 2025-04-21

## 2025-05-12 ENCOUNTER — OFFICE VISIT (OUTPATIENT)
Dept: OBSTETRICS AND GYNECOLOGY | Facility: CLINIC | Age: 35
End: 2025-05-12
Payer: COMMERCIAL

## 2025-05-12 VITALS
WEIGHT: 232 LBS | SYSTOLIC BLOOD PRESSURE: 124 MMHG | BODY MASS INDEX: 39.61 KG/M2 | HEIGHT: 64 IN | DIASTOLIC BLOOD PRESSURE: 84 MMHG

## 2025-05-12 DIAGNOSIS — N93.9 ABNORMAL UTERINE BLEEDING (AUB): Primary | ICD-10-CM

## 2025-05-12 LAB
BASOPHILS # BLD AUTO: 0.07 10*3/MM3 (ref 0–0.2)
BASOPHILS NFR BLD AUTO: 0.9 % (ref 0–1.5)
EOSINOPHIL # BLD AUTO: 0.99 10*3/MM3 (ref 0–0.4)
EOSINOPHIL NFR BLD AUTO: 12.6 % (ref 0.3–6.2)
ERYTHROCYTE [DISTWIDTH] IN BLOOD BY AUTOMATED COUNT: 11.9 % (ref 12.3–15.4)
HCT VFR BLD AUTO: 37.3 % (ref 34–46.6)
HGB BLD-MCNC: 11.9 G/DL (ref 12–15.9)
IMM GRANULOCYTES # BLD AUTO: 0.01 10*3/MM3 (ref 0–0.05)
IMM GRANULOCYTES NFR BLD AUTO: 0.1 % (ref 0–0.5)
LYMPHOCYTES # BLD AUTO: 2.23 10*3/MM3 (ref 0.7–3.1)
LYMPHOCYTES NFR BLD AUTO: 28.4 % (ref 19.6–45.3)
MCH RBC QN AUTO: 29.6 PG (ref 26.6–33)
MCHC RBC AUTO-ENTMCNC: 31.9 G/DL (ref 31.5–35.7)
MCV RBC AUTO: 92.8 FL (ref 79–97)
MONOCYTES # BLD AUTO: 0.6 10*3/MM3 (ref 0.1–0.9)
MONOCYTES NFR BLD AUTO: 7.6 % (ref 5–12)
NEUTROPHILS # BLD AUTO: 3.95 10*3/MM3 (ref 1.7–7)
NEUTROPHILS NFR BLD AUTO: 50.4 % (ref 42.7–76)
NRBC BLD AUTO-RTO: 0 /100 WBC (ref 0–0.2)
PLATELET # BLD AUTO: 364 10*3/MM3 (ref 140–450)
RBC # BLD AUTO: 4.02 10*6/MM3 (ref 3.77–5.28)
WBC # BLD AUTO: 7.85 10*3/MM3 (ref 3.4–10.8)

## 2025-05-12 PROCEDURE — 1160F RVW MEDS BY RX/DR IN RCRD: CPT | Performed by: MIDWIFE

## 2025-05-12 PROCEDURE — 1159F MED LIST DOCD IN RCRD: CPT | Performed by: MIDWIFE

## 2025-05-12 PROCEDURE — 99213 OFFICE O/P EST LOW 20 MIN: CPT | Performed by: MIDWIFE

## 2025-05-12 RX ORDER — ALBUTEROL SULFATE 90 UG/1
2 AEROSOL, METERED RESPIRATORY (INHALATION) EVERY 4 HOURS PRN
COMMUNITY
Start: 2025-03-06

## 2025-05-12 RX ORDER — CHOLECALCIFEROL (VITAMIN D3) 1250 MCG
CAPSULE ORAL
COMMUNITY
Start: 2025-05-11

## 2025-05-12 NOTE — PROGRESS NOTES
"Chief Complaint   Patient presents with    Vaginal Bleeding     Heavy bleeding with Nexplanon      Tea Nelson is a 35 y.o. year old  presenting to be seen for abnormal bleeding with Nexplanon. She also c/o decreased libido.  Nexplanon was inserted 25. She states she spots all of the time but also has heavy periods that last 3-4 days. She has to change tampons hourly when it is heavy.   She is also trying to lose weight but didn't qualify for medication. Her PCP checked her labs also but not her hormone levels. She wants to have hormones checked.    History  Past Medical History:   Diagnosis Date    Patient denies medical problems    , Allergies:  Ceclor [cefaclor]    Social History    Tobacco Use      Smoking status: Never      Smokeless tobacco: Never      Review of Systems  Pertinent items are noted in HPI, all other systems reviewed and negative       Objective   /84   Ht 162.6 cm (64\")   Wt 105 kg (232 lb)   LMP 2025   BMI 39.82 kg/m²     Physical Exam:  General Appearance: alert, appears stated age, and cooperative  Lungs: respirations regular, respirations even, and respirations unlabored  Extremities: moves extremities well, no edema, no cyanosis, and no redness  Skin: no bleeding, bruising or rash and no lesions noted  Neurologic: Mental Status orientated to person, place, time and situation, Speech normal content and execusion    Lab Review   No data reviewed    Imaging   No data reviewed         Assessment /Plan    Diagnoses and all orders for this visit:    1. Abnormal uterine bleeding (AUB) (Primary)  -     CBC Auto Differential  Discussed options for Nexplanon: add OCs x 2-3 months, observe longer, or have it removed. She doesn't want OCs because she is afraid of further weight gain. She wants to keep it longer and observe. Advised that if bleeding continued, RTO in August for TVS.  Discussed Hormone levels not accurate with Nexplanon in.        Discussed Clotilde or Addyii " for decreased libido      No orders of the defined types were placed in this encounter.    Follow up PRN           This note was electronically signed.  Georgia Ortiz CNM  5/12/2025

## 2025-06-24 ENCOUNTER — APPOINTMENT (OUTPATIENT)
Dept: GENERAL RADIOLOGY | Facility: HOSPITAL | Age: 35
End: 2025-06-24
Payer: COMMERCIAL

## 2025-06-24 ENCOUNTER — HOSPITAL ENCOUNTER (EMERGENCY)
Facility: HOSPITAL | Age: 35
Discharge: HOME | End: 2025-06-24
Attending: STUDENT IN AN ORGANIZED HEALTH CARE EDUCATION/TRAINING PROGRAM | Admitting: STUDENT IN AN ORGANIZED HEALTH CARE EDUCATION/TRAINING PROGRAM
Payer: COMMERCIAL

## 2025-06-24 VITALS
HEART RATE: 90 BPM | TEMPERATURE: 97.9 F | SYSTOLIC BLOOD PRESSURE: 141 MMHG | RESPIRATION RATE: 16 BRPM | OXYGEN SATURATION: 98 % | WEIGHT: 227 LBS | DIASTOLIC BLOOD PRESSURE: 86 MMHG | HEIGHT: 64 IN | BODY MASS INDEX: 38.76 KG/M2

## 2025-06-24 DIAGNOSIS — R07.9 CHEST PAIN, UNSPECIFIED TYPE: Primary | ICD-10-CM

## 2025-06-24 LAB
ALBUMIN SERPL-MCNC: 4.7 G/DL (ref 3.5–5.2)
ALBUMIN/GLOB SERPL: 1.4 G/DL
ALP SERPL-CCNC: 64 U/L (ref 39–117)
ALT SERPL W P-5'-P-CCNC: 17 U/L (ref 1–33)
ANION GAP SERPL CALCULATED.3IONS-SCNC: 10.5 MMOL/L (ref 5–15)
AST SERPL-CCNC: 18 U/L (ref 1–32)
B-HCG UR QL: POSITIVE
BASOPHILS # BLD AUTO: 0.1 10*3/MM3 (ref 0–0.2)
BASOPHILS NFR BLD AUTO: 1 % (ref 0–1.5)
BILIRUB SERPL-MCNC: <0.2 MG/DL (ref 0–1.2)
BILIRUB UR QL STRIP: NEGATIVE
BUN SERPL-MCNC: 10 MG/DL (ref 6–20)
BUN/CREAT SERPL: 13.5 (ref 7–25)
CALCIUM SPEC-SCNC: 9.3 MG/DL (ref 8.6–10.5)
CHLORIDE SERPL-SCNC: 103 MMOL/L (ref 98–107)
CLARITY UR: CLEAR
CO2 SERPL-SCNC: 27.5 MMOL/L (ref 22–29)
COLOR UR: YELLOW
CREAT SERPL-MCNC: 0.74 MG/DL (ref 0.57–1)
D DIMER PPP FEU-MCNC: <0.27 MCGFEU/ML (ref 0–0.5)
DEPRECATED RDW RBC AUTO: 38.6 FL (ref 37–54)
EGFRCR SERPLBLD CKD-EPI 2021: 107 ML/MIN/1.73
EOSINOPHIL # BLD AUTO: 1.05 10*3/MM3 (ref 0–0.4)
EOSINOPHIL NFR BLD AUTO: 10 % (ref 0.3–6.2)
ERYTHROCYTE [DISTWIDTH] IN BLOOD BY AUTOMATED COUNT: 12 % (ref 12.3–15.4)
GEN 5 1HR TROPONIN T REFLEX: <6 NG/L
GLOBULIN UR ELPH-MCNC: 3.4 GM/DL
GLUCOSE SERPL-MCNC: 89 MG/DL (ref 65–99)
GLUCOSE UR STRIP-MCNC: NEGATIVE MG/DL
HCG INTACT+B SERPL-ACNC: <0.1 MIU/ML
HCT VFR BLD AUTO: 36.9 % (ref 34–46.6)
HGB BLD-MCNC: 12.6 G/DL (ref 12–15.9)
HGB UR QL STRIP.AUTO: NEGATIVE
HOLD SPECIMEN: NORMAL
HOLD SPECIMEN: NORMAL
IMM GRANULOCYTES # BLD AUTO: 0.02 10*3/MM3 (ref 0–0.05)
IMM GRANULOCYTES NFR BLD AUTO: 0.2 % (ref 0–0.5)
KETONES UR QL STRIP: NEGATIVE
LEUKOCYTE ESTERASE UR QL STRIP.AUTO: NEGATIVE
LYMPHOCYTES # BLD AUTO: 3.31 10*3/MM3 (ref 0.7–3.1)
LYMPHOCYTES NFR BLD AUTO: 31.7 % (ref 19.6–45.3)
MCH RBC QN AUTO: 29.9 PG (ref 26.6–33)
MCHC RBC AUTO-ENTMCNC: 34.1 G/DL (ref 31.5–35.7)
MCV RBC AUTO: 87.4 FL (ref 79–97)
MONOCYTES # BLD AUTO: 0.54 10*3/MM3 (ref 0.1–0.9)
MONOCYTES NFR BLD AUTO: 5.2 % (ref 5–12)
NEUTROPHILS NFR BLD AUTO: 5.43 10*3/MM3 (ref 1.7–7)
NEUTROPHILS NFR BLD AUTO: 51.9 % (ref 42.7–76)
NITRITE UR QL STRIP: NEGATIVE
NRBC BLD AUTO-RTO: 0 /100 WBC (ref 0–0.2)
PH UR STRIP.AUTO: 7.5 [PH] (ref 5–8)
PLATELET # BLD AUTO: 380 10*3/MM3 (ref 140–450)
PMV BLD AUTO: 9.5 FL (ref 6–12)
POTASSIUM SERPL-SCNC: 3.9 MMOL/L (ref 3.5–5.2)
PROT SERPL-MCNC: 8.1 G/DL (ref 6–8.5)
PROT UR QL STRIP: NEGATIVE
RBC # BLD AUTO: 4.22 10*6/MM3 (ref 3.77–5.28)
SODIUM SERPL-SCNC: 141 MMOL/L (ref 136–145)
SP GR UR STRIP: <=1.005 (ref 1–1.03)
TROPONIN T NUMERIC DELTA: NORMAL
TROPONIN T SERPL HS-MCNC: <6 NG/L
UROBILINOGEN UR QL STRIP: NORMAL
WBC NRBC COR # BLD AUTO: 10.45 10*3/MM3 (ref 3.4–10.8)
WHOLE BLOOD HOLD COAG: NORMAL
WHOLE BLOOD HOLD SPECIMEN: NORMAL

## 2025-06-24 PROCEDURE — 71045 X-RAY EXAM CHEST 1 VIEW: CPT

## 2025-06-24 PROCEDURE — 85025 COMPLETE CBC W/AUTO DIFF WBC: CPT | Performed by: STUDENT IN AN ORGANIZED HEALTH CARE EDUCATION/TRAINING PROGRAM

## 2025-06-24 PROCEDURE — 99284 EMERGENCY DEPT VISIT MOD MDM: CPT | Performed by: STUDENT IN AN ORGANIZED HEALTH CARE EDUCATION/TRAINING PROGRAM

## 2025-06-24 PROCEDURE — 84702 CHORIONIC GONADOTROPIN TEST: CPT

## 2025-06-24 PROCEDURE — 80053 COMPREHEN METABOLIC PANEL: CPT | Performed by: STUDENT IN AN ORGANIZED HEALTH CARE EDUCATION/TRAINING PROGRAM

## 2025-06-24 PROCEDURE — 93005 ELECTROCARDIOGRAM TRACING: CPT

## 2025-06-24 PROCEDURE — 93005 ELECTROCARDIOGRAM TRACING: CPT | Performed by: STUDENT IN AN ORGANIZED HEALTH CARE EDUCATION/TRAINING PROGRAM

## 2025-06-24 PROCEDURE — 84484 ASSAY OF TROPONIN QUANT: CPT | Performed by: STUDENT IN AN ORGANIZED HEALTH CARE EDUCATION/TRAINING PROGRAM

## 2025-06-24 PROCEDURE — 36415 COLL VENOUS BLD VENIPUNCTURE: CPT

## 2025-06-24 PROCEDURE — 81025 URINE PREGNANCY TEST: CPT | Performed by: STUDENT IN AN ORGANIZED HEALTH CARE EDUCATION/TRAINING PROGRAM

## 2025-06-24 PROCEDURE — 85379 FIBRIN DEGRADATION QUANT: CPT

## 2025-06-24 PROCEDURE — 81003 URINALYSIS AUTO W/O SCOPE: CPT | Performed by: STUDENT IN AN ORGANIZED HEALTH CARE EDUCATION/TRAINING PROGRAM

## 2025-06-24 RX ORDER — SODIUM CHLORIDE 0.9 % (FLUSH) 0.9 %
10 SYRINGE (ML) INJECTION AS NEEDED
Status: DISCONTINUED | OUTPATIENT
Start: 2025-06-24 | End: 2025-06-24 | Stop reason: HOSPADM

## 2025-06-24 RX ORDER — ASPIRIN 325 MG
325 TABLET ORAL ONCE
Status: COMPLETED | OUTPATIENT
Start: 2025-06-24 | End: 2025-06-24

## 2025-06-24 RX ORDER — SUCRALFATE 1 G/1
1 TABLET ORAL 4 TIMES DAILY
Qty: 20 TABLET | Refills: 0 | Status: SHIPPED | OUTPATIENT
Start: 2025-06-24

## 2025-06-24 RX ADMIN — ASPIRIN 325 MG: 325 TABLET ORAL at 17:21

## 2025-06-24 NOTE — ED PROVIDER NOTES
EMERGENCY DEPARTMENT ENCOUNTER    Pt Name: Tea Nelson  MRN: [id number]  Pt :   1990  Room Number:  14  Date of encounter:  2025  PCP: Gabriella Willard APRN  ED Provider: Shlomo Strauss PA-C    Historian: Patient,  at bedside, nursing notes      HPI:  Chief Complaint: Chest pain        Context: Tea Nelson is a 35 y.o. female who presents to the ED c/o right-sided chest pain since Ronni morning that has become persistent.  Patient reports pain in the right side of her chest that occasionally radiates up into her esophagus.  Patient denies any shortness of breath, lightheadedness or dizziness, vision changes, numbness, arm pain or shoulder pain, back pain, abdominal pain or any other complaint at this time.  Patient states she has a Nexplanon implant.  Patient denies any history of PE or DVT.      PAST MEDICAL HISTORY  Past Medical History:   Diagnosis Date    Patient denies medical problems          PAST SURGICAL HISTORY  Past Surgical History:   Procedure Laterality Date    LAPAROSCOPIC EXPLORATION FOR ECTOPIC PREGNANCY N/A 2021    Procedure: LAPAROSCOPIC EXPLORATION FOR ECTOPIC PREGNANCY, RIGHT SALPINGECTOMY;  Surgeon: Deloris Leon MD;  Location: [facility];  Service: Obstetrics/Gynecology;  Laterality: N/A;    NO PAST SURGERIES      TONSILLECTOMY AND ADENOIDECTOMY           FAMILY HISTORY  History reviewed. No pertinent family history.      SOCIAL HISTORY  Social History     Socioeconomic History    Marital status:      Spouse name: rosangela    Number of children: 3    Highest education level: Bachelor's degree (e.g., BA, AB, BS)   Tobacco Use    Smoking status: Never    Smokeless tobacco: Never   Vaping Use    Vaping status: Never Used   Substance and Sexual Activity    Alcohol use: No    Drug use: No    Sexual activity: Yes     Partners: Male     Birth control/protection: None         ALLERGIES  Ceclor [cefaclor]        REVIEW OF SYSTEMS  Review of Systems    Constitutional:  Negative for chills and fever.   HENT:  Negative for congestion and sore throat.    Respiratory:  Negative for cough and shortness of breath.    Cardiovascular:  Positive for chest pain. Negative for palpitations and leg swelling.   Gastrointestinal:  Negative for abdominal pain, nausea and vomiting.   Genitourinary:  Negative for dysuria.   Musculoskeletal:  Negative for back pain.   Skin:  Negative for wound.   Neurological:  Negative for dizziness and headaches.   Psychiatric/Behavioral:  Negative for confusion.    All other systems reviewed and are negative.         All systems reviewed and negative except for those discussed in HPI.       PHYSICAL EXAM    I have reviewed the triage vital signs and nursing notes.    ED Triage Vitals [06/24/25 1644]   Temp Heart Rate Resp BP SpO2   97.9 °F (36.6 °C) 83 16 155/96 100 %      Temp src Heart Rate Source Patient Position BP Location FiO2 (%)   Oral Monitor Sitting Left arm --       Physical Exam  Vitals and nursing note reviewed.   Constitutional:       General: She is not in acute distress.     Appearance: She is not ill-appearing, toxic-appearing or diaphoretic.   HENT:      Head: Normocephalic and atraumatic.      Mouth/Throat:      Mouth: Mucous membranes are moist.      Pharynx: Oropharynx is clear.   Eyes:      Extraocular Movements: Extraocular movements intact.   Cardiovascular:      Rate and Rhythm: Normal rate.      Heart sounds: Normal heart sounds.   Pulmonary:      Effort: Pulmonary effort is normal. No respiratory distress.      Breath sounds: Normal breath sounds. No decreased breath sounds, wheezing, rhonchi or rales.   Abdominal:      Tenderness: There is no abdominal tenderness.   Skin:     General: Skin is warm and dry.      Findings: No rash.   Neurological:      Mental Status: She is alert.             LAB RESULTS  Recent Results (from the past 24 hours)   Comprehensive Metabolic Panel    Collection Time: 06/24/25  5:20 PM     Specimen: Blood   Result Value Ref Range    Glucose 89 65 - 99 mg/dL    BUN 10.0 6.0 - 20.0 mg/dL    Creatinine 0.74 0.57 - 1.00 mg/dL    Sodium 141 136 - 145 mmol/L    Potassium 3.9 3.5 - 5.2 mmol/L    Chloride 103 98 - 107 mmol/L    CO2 27.5 22.0 - 29.0 mmol/L    Calcium 9.3 8.6 - 10.5 mg/dL    Total Protein 8.1 6.0 - 8.5 g/dL    Albumin 4.7 3.5 - 5.2 g/dL    ALT (SGPT) 17 1 - 33 U/L    AST (SGOT) 18 1 - 32 U/L    Alkaline Phosphatase 64 39 - 117 U/L    Total Bilirubin <0.2 0.0 - 1.2 mg/dL    Globulin 3.4 gm/dL    A/G Ratio 1.4 g/dL    BUN/Creatinine Ratio 13.5 7.0 - 25.0    Anion Gap 10.5 5.0 - 15.0 mmol/L    eGFR 107.0 >60.0 mL/min/1.73   High Sensitivity Troponin T    Collection Time: 06/24/25  5:20 PM    Specimen: Blood   Result Value Ref Range    HS Troponin T <6 <14 ng/L   Green Top (Gel)    Collection Time: 06/24/25  5:20 PM   Result Value Ref Range    Extra Tube Hold for add-ons.    Lavender Top    Collection Time: 06/24/25  5:20 PM   Result Value Ref Range    Extra Tube hold for add-on    Gold Top - SST    Collection Time: 06/24/25  5:20 PM   Result Value Ref Range    Extra Tube Hold for add-ons.    Light Blue Top    Collection Time: 06/24/25  5:20 PM   Result Value Ref Range    Extra Tube Hold for add-ons.    CBC Auto Differential    Collection Time: 06/24/25  5:20 PM    Specimen: Blood   Result Value Ref Range    WBC 10.45 3.40 - 10.80 10*3/mm3    RBC 4.22 3.77 - 5.28 10*6/mm3    Hemoglobin 12.6 12.0 - 15.9 g/dL    Hematocrit 36.9 34.0 - 46.6 %    MCV 87.4 79.0 - 97.0 fL    MCH 29.9 26.6 - 33.0 pg    MCHC 34.1 31.5 - 35.7 g/dL    RDW 12.0 (L) 12.3 - 15.4 %    RDW-SD 38.6 37.0 - 54.0 fl    MPV 9.5 6.0 - 12.0 fL    Platelets 380 140 - 450 10*3/mm3    Neutrophil % 51.9 42.7 - 76.0 %    Lymphocyte % 31.7 19.6 - 45.3 %    Monocyte % 5.2 5.0 - 12.0 %    Eosinophil % 10.0 (H) 0.3 - 6.2 %    Basophil % 1.0 0.0 - 1.5 %    Immature Grans % 0.2 0.0 - 0.5 %    Neutrophils, Absolute 5.43 1.70 - 7.00 10*3/mm3     Lymphocytes, Absolute 3.31 (H) 0.70 - 3.10 10*3/mm3    Monocytes, Absolute 0.54 0.10 - 0.90 10*3/mm3    Eosinophils, Absolute 1.05 (H) 0.00 - 0.40 10*3/mm3    Basophils, Absolute 0.10 0.00 - 0.20 10*3/mm3    Immature Grans, Absolute 0.02 0.00 - 0.05 10*3/mm3    nRBC 0.0 0.0 - 0.2 /100 WBC   D-dimer, Quantitative    Collection Time: 06/24/25  5:20 PM    Specimen: Blood   Result Value Ref Range    D-Dimer, Quantitative <0.27 0.00 - 0.50 MCGFEU/mL   hCG, Quantitative, Pregnancy    Collection Time: 06/24/25  5:20 PM    Specimen: Blood   Result Value Ref Range    HCG Quantitative <0.10 mIU/mL   Urinalysis With Microscopic If Indicated (No Culture) - Urine, Clean Catch    Collection Time: 06/24/25  5:21 PM    Specimen: Urine, Clean Catch   Result Value Ref Range    Color, UA Yellow Yellow, Straw    Appearance, UA Clear Clear    pH, UA 7.5 5.0 - 8.0    Specific Gravity, UA <=1.005 1.005 - 1.030    Glucose, UA Negative Negative    Ketones, UA Negative Negative    Bilirubin, UA Negative Negative    Blood, UA Negative Negative    Protein, UA Negative Negative    Leuk Esterase, UA Negative Negative    Nitrite, UA Negative Negative    Urobilinogen, UA 0.2 E.U./dL 0.2 - 1.0 E.U./dL   Pregnancy, Urine - Urine, Clean Catch    Collection Time: 06/24/25  5:21 PM    Specimen: Urine, Clean Catch   Result Value Ref Range    HCG, Urine QL Positive (A) Negative   High Sensitivity Troponin T 1Hr    Collection Time: 06/24/25  6:26 PM    Specimen: Blood   Result Value Ref Range    HS Troponin T <6 <14 ng/L    Troponin T Numeric Delta         If labs were ordered, I independently reviewed the results and considered them in treating the patient.        RADIOLOGY  No Radiology Exams Resulted Within Past 24 Hours    I ordered and independently reviewed the above noted radiographic studies.      I viewed images of chest x-ray which showed no acute process per my independent interpretation.    See radiologist's dictation for official  interpretation.        PROCEDURES    Procedures    ECG 12 Lead ED Triage Standing Order; Chest Pain   Final Result          MEDICATIONS GIVEN IN ER    Medications   aspirin tablet 325 mg (325 mg Oral Given 6/24/25 1721)         MEDICAL DECISION MAKING, PROGRESS, and CONSULTS    All labs, if obtained, have been independently reviewed by me.  All radiology studies, if obtained, have been reviewed by me and the radiologist dictating the report.  All EKG's, if obtained, have been independently viewed and interpreted by me/my attending physician.      Discussion below represents my analysis of pertinent findings related to patient's condition, differential diagnosis, treatment plan and final disposition.    Patient is a 35-year-old female presenting for persistent since Sunday.  On arrival today the patient's vital signs and pulse symmetries independently interpreted by me are all stable within normal limits.  She is appropriate oriented in no acute distress no conversational dyspnea and no increased work of breathing.  Lungs are clear to auscultation bilaterally and her abdomen is soft and nontender, no positive HEENT findings on exam, no peripheral edema.  Cardiac workup was initiated.      EKG showed no acute ischemic changes per ED attending interpretation normal sinus rhythm normal rate.  Chest x-ray unremarkable per my independent interpretation, 2 high-sensitivity activity troponins were within the normal limits and flat.  Lab work revealed no electrolyte abnormalities, no leukocytosis, no anemia.  Given reassuring imaging EKG and labs, etiology of the patient's chest pain remains unclear but I feel workup today is sufficiently ruled out arrhythmia, acute coronary syndrome or other more morbid cardiac or pulmonary cause and that the patient is now stable for discharge with recommended outpatient follow-up with cardiology chest pain clinic and her primary care provider.  Strict ED return precautions were explained  the patient verbalized understanding of and agreement today's plan of care.  Patient has a low heart score of 1.  Of note, patient's initial urinalysis had showed a positive hCG but hCG quantitative levels were undetectable, I do suspect that the urine pregnancy test was a false positive given quantitative hCG results.  She will follow-up on an outpatient basis with PCP regarding this.  D-dimer was also normal lowering any suspicion for pulmonary embolism.        HEART Score: 1                Differential diagnosis:    Differential diagnosis included but was not limited to ACS, arrhythmia, pneumonia, pleurisy, gastritis, GERD, esophagitis, anxiety      Additional sources:    - Discussed/ obtained information from independent historians: Patient's  at bedside    - External (non-ED) record review: Previous medical records reviewed    - Chronic or social conditions impacting care: None    Orders placed during this visit:  Orders Placed This Encounter   Procedures    XR Chest 1 View    [place] Draw    Comprehensive Metabolic Panel    High Sensitivity Troponin T    CBC Auto Differential    Urinalysis With Microscopic If Indicated (No Culture) - Urine, Clean Catch    Pregnancy, Urine - Urine, Clean Catch    D-dimer, Quantitative    High Sensitivity Troponin T 1Hr    hCG, Quantitative, Pregnancy    Ambulatory Referral to Cardiology for Chest Pain    Undress & Gown    Continuous Pulse Oximetry    ECG 12 Lead ED Triage Standing Order; Chest Pain    CBC & Differential    Green Top (Gel)    Lavender Top    Gold Top - SST    Light Blue Top         Additional orders considered but not ordered: None      ED Course:    Consultants: None    ED Course as of 06/25/25 0251   Tue Jun 24, 2025 1659 I have reviewed the mid-level provider(s) note and verbally discussed the case/plan of care.  I was available for consultation as needed at all times during the patient's visit in the Emergency Department.  I agree with the clinical  impression, plan, and disposition unless otherwise stated in the MDM below.    ATTENDING ATTESTATION  HPI: 35 y.o. female who presents with chest pain.  Intermittent since Sunday.  Right-sided with occasional radiation to the throat.  No previous cardiac history.    MDM: ED workup reviewed.    EKG per my interpretation normal sinus rhythm, rate 81, normal axis, no ST segment elevation or depression, normal T waves, normal QRS QTC intervals.    I have reviewed the labs results. Clinically unremarkable.    Notable findings include: Troponin negative x 2, D-dimer negative.  Qualitative hCG positive, but quantitative hCG negative.    I have independently reviewed and interpreted the chest x-ray.  My interpretation is negative for infiltrate.     [JS]   1733 Patient declined analgesia in the emergency department [TG]   1810 HCG, Urine QL(!): Positive [TG]      ED Course User Index  [JS] Petr Hunt DO  [TG] Shlomo Strauss PA-C              Shared Decision Making:  After my consideration of clinical presentation and any laboratory/radiology studies obtained, I discussed the findings with the patient/patient representative who is in agreement with the treatment plan and the final disposition.   Risks and benefits of discharge and/or observation/admission were discussed.       AS OF 02:51 EDT VITALS:    BP - 141/86  HR - 90  TEMP - 97.9 °F (36.6 °C) (Oral)  O2 SATS - 98%                  DIAGNOSIS  Final diagnoses:   Chest pain, unspecified type         DISPOSITION  Discharge      Please note that portions of this document were completed with voice recognition software.      Shlomo Strauss PA-C  06/25/25 0252

## 2025-06-24 NOTE — DISCHARGE INSTRUCTIONS
Follow up with your primary care doctor about your blood pressure, your chest pain symptoms and cough.  We also recommend follow up with a Cardiologist for further evaluation of your chest pain.  Someone from the [facility] will contact you by phone to arrange this appointment

## 2025-06-25 ENCOUNTER — TELEPHONE (OUTPATIENT)
Dept: CARDIOLOGY | Facility: CLINIC | Age: 35
End: 2025-06-25
Payer: COMMERCIAL

## 2025-06-27 ENCOUNTER — TELEPHONE (OUTPATIENT)
Dept: CARDIOLOGY | Facility: CLINIC | Age: 35
End: 2025-06-27
Payer: COMMERCIAL

## 2025-07-17 ENCOUNTER — LAB (OUTPATIENT)
Dept: LAB | Facility: HOSPITAL | Age: 35
End: 2025-07-17
Payer: COMMERCIAL

## 2025-07-17 ENCOUNTER — TELEPHONE (OUTPATIENT)
Dept: OBSTETRICS AND GYNECOLOGY | Facility: CLINIC | Age: 35
End: 2025-07-17
Payer: COMMERCIAL

## 2025-07-17 DIAGNOSIS — Z30.2 REQUEST FOR STERILIZATION: ICD-10-CM

## 2025-07-17 DIAGNOSIS — N92.6 MISSED PERIOD: Primary | ICD-10-CM

## 2025-07-17 LAB
BACTERIA UR QL AUTO: NORMAL /HPF
BASOPHILS # BLD AUTO: 0.07 10*3/MM3 (ref 0–0.2)
BASOPHILS NFR BLD AUTO: 0.7 % (ref 0–1.5)
BILIRUB UR QL STRIP: NEGATIVE
CLARITY UR: CLEAR
COLOR UR: YELLOW
DEPRECATED RDW RBC AUTO: 37.2 FL (ref 37–54)
EOSINOPHIL # BLD AUTO: 0.91 10*3/MM3 (ref 0–0.4)
EOSINOPHIL NFR BLD AUTO: 9.6 % (ref 0.3–6.2)
ERYTHROCYTE [DISTWIDTH] IN BLOOD BY AUTOMATED COUNT: 11.6 % (ref 12.3–15.4)
GLUCOSE UR STRIP-MCNC: NEGATIVE MG/DL
HCT VFR BLD AUTO: 34.9 % (ref 34–46.6)
HGB BLD-MCNC: 11.9 G/DL (ref 12–15.9)
HGB UR QL STRIP.AUTO: ABNORMAL
HOLD SPECIMEN: NORMAL
HYALINE CASTS UR QL AUTO: NORMAL /LPF
IMM GRANULOCYTES # BLD AUTO: 0.03 10*3/MM3 (ref 0–0.05)
IMM GRANULOCYTES NFR BLD AUTO: 0.3 % (ref 0–0.5)
KETONES UR QL STRIP: NEGATIVE
LEUKOCYTE ESTERASE UR QL STRIP.AUTO: NEGATIVE
LYMPHOCYTES # BLD AUTO: 2.86 10*3/MM3 (ref 0.7–3.1)
LYMPHOCYTES NFR BLD AUTO: 30.2 % (ref 19.6–45.3)
MCH RBC QN AUTO: 30.4 PG (ref 26.6–33)
MCHC RBC AUTO-ENTMCNC: 34.1 G/DL (ref 31.5–35.7)
MCV RBC AUTO: 89.3 FL (ref 79–97)
MONOCYTES # BLD AUTO: 0.5 10*3/MM3 (ref 0.1–0.9)
MONOCYTES NFR BLD AUTO: 5.3 % (ref 5–12)
NEUTROPHILS NFR BLD AUTO: 5.1 10*3/MM3 (ref 1.7–7)
NEUTROPHILS NFR BLD AUTO: 53.9 % (ref 42.7–76)
NITRITE UR QL STRIP: NEGATIVE
NRBC BLD AUTO-RTO: 0 /100 WBC (ref 0–0.2)
PH UR STRIP.AUTO: 5.5 [PH] (ref 5–8)
PLATELET # BLD AUTO: 341 10*3/MM3 (ref 140–450)
PMV BLD AUTO: 10 FL (ref 6–12)
PROT UR QL STRIP: NEGATIVE
RBC # BLD AUTO: 3.91 10*6/MM3 (ref 3.77–5.28)
RBC # UR STRIP: NORMAL /HPF
REF LAB TEST METHOD: NORMAL
SP GR UR STRIP: 1.02 (ref 1–1.03)
SQUAMOUS #/AREA URNS HPF: NORMAL /HPF
UROBILINOGEN UR QL STRIP: ABNORMAL
WBC # UR STRIP: NORMAL /HPF
WBC NRBC COR # BLD AUTO: 9.47 10*3/MM3 (ref 3.4–10.8)

## 2025-07-17 PROCEDURE — 81001 URINALYSIS AUTO W/SCOPE: CPT

## 2025-07-17 PROCEDURE — 85025 COMPLETE CBC W/AUTO DIFF WBC: CPT

## 2025-07-17 PROCEDURE — 36415 COLL VENOUS BLD VENIPUNCTURE: CPT

## 2025-07-17 NOTE — TELEPHONE ENCOUNTER
I don't have any results yet. If she has MyChart, she will see them probably before me. I may not see them until tomorrow.

## 2025-07-17 NOTE — TELEPHONE ENCOUNTER
Patient called stating that she was told she would get results from the HCG levels by today. Can you review them?

## 2025-07-17 NOTE — TELEPHONE ENCOUNTER
Patient has a Nexplanon and she went to the ER and they did a urine pregnancy test that was positive then an HCG serum and it was negative. She still has had no period and iw requesting another HCG order for the hospital lab

## 2025-08-14 ENCOUNTER — OFFICE VISIT (OUTPATIENT)
Dept: BEHAVIORAL HEALTH | Facility: CLINIC | Age: 35
End: 2025-08-14
Payer: COMMERCIAL

## 2025-08-14 DIAGNOSIS — E66.9 OBESITY (BMI 30-39.9): Primary | ICD-10-CM

## 2025-08-14 DIAGNOSIS — F43.23 SITUATIONAL MIXED ANXIETY AND DEPRESSIVE DISORDER: ICD-10-CM

## 2025-08-14 DIAGNOSIS — Z71.89 ENCOUNTER FOR PSYCHOLOGICAL ASSESSMENT PRIOR TO BARIATRIC SURGERY: ICD-10-CM

## 2025-08-14 PROCEDURE — 90791 PSYCH DIAGNOSTIC EVALUATION: CPT | Performed by: PSYCHOLOGIST

## 2025-08-22 ENCOUNTER — PATIENT ROUNDING (BHMG ONLY) (OUTPATIENT)
Dept: BARIATRICS/WEIGHT MGMT | Facility: CLINIC | Age: 35
End: 2025-08-22
Payer: COMMERCIAL

## (undated) DEVICE — ENDOPATH XCEL UNIVERSAL TROCAR STABLILITY SLEEVES: Brand: ENDOPATH XCEL

## (undated) DEVICE — SOL IRR H2O BTL 1000ML STRL

## (undated) DEVICE — SLV SCD CALF HEMOFORCE DVT THERP REPROC MD

## (undated) DEVICE — TOTAL TRAY, 16FR 10ML SIL FOLEY, URN: Brand: MEDLINE

## (undated) DEVICE — BLUNT TIP LAPAROSCOPIC SEALER/DIVIDER NANO-COATED: Brand: LIGASURE

## (undated) DEVICE — GLV SURG BIOGEL M LTX PF 6 1/2

## (undated) DEVICE — SPNG GZ WOVN 4X4IN 12PLY 10/BX STRL

## (undated) DEVICE — Device

## (undated) DEVICE — ST IRR CYSTO W/SPK 77IN LF

## (undated) DEVICE — ENDOPATH XCEL BLADELESS TROCARS WITH STABILITY SLEEVES: Brand: ENDOPATH XCEL

## (undated) DEVICE — SUT ETHLN 4/0 PS2 PLSTC 1667G

## (undated) DEVICE — PENCL ES MEGADINE EZ/CLEAN BUTN W/HOLSTR 10FT

## (undated) DEVICE — RICH LAVH: Brand: MEDLINE INDUSTRIES, INC.